# Patient Record
Sex: FEMALE | Race: WHITE | Employment: FULL TIME | URBAN - METROPOLITAN AREA
[De-identification: names, ages, dates, MRNs, and addresses within clinical notes are randomized per-mention and may not be internally consistent; named-entity substitution may affect disease eponyms.]

---

## 2017-04-25 ENCOUNTER — OFFICE VISIT (OUTPATIENT)
Dept: OBGYN CLINIC | Facility: CLINIC | Age: 27
End: 2017-04-25

## 2017-04-25 VITALS
HEART RATE: 88 BPM | HEIGHT: 61 IN | SYSTOLIC BLOOD PRESSURE: 114 MMHG | WEIGHT: 107 LBS | DIASTOLIC BLOOD PRESSURE: 57 MMHG | BODY MASS INDEX: 20.2 KG/M2

## 2017-04-25 DIAGNOSIS — N76.0 VAGINITIS AND VULVOVAGINITIS: ICD-10-CM

## 2017-04-25 DIAGNOSIS — Z01.419 WELL WOMAN EXAM WITH ROUTINE GYNECOLOGICAL EXAM: Primary | ICD-10-CM

## 2017-04-25 DIAGNOSIS — N39.0 URINARY TRACT INFECTION, SITE UNSPECIFIED: ICD-10-CM

## 2017-04-25 DIAGNOSIS — Z11.3 SCREEN FOR STD (SEXUALLY TRANSMITTED DISEASE): ICD-10-CM

## 2017-04-25 PROCEDURE — 88175 CYTOPATH C/V AUTO FLUID REDO: CPT | Performed by: OBSTETRICS & GYNECOLOGY

## 2017-04-25 PROCEDURE — 87510 GARDNER VAG DNA DIR PROBE: CPT | Performed by: OBSTETRICS & GYNECOLOGY

## 2017-04-25 PROCEDURE — 81002 URINALYSIS NONAUTO W/O SCOPE: CPT | Performed by: OBSTETRICS & GYNECOLOGY

## 2017-04-25 PROCEDURE — 87480 CANDIDA DNA DIR PROBE: CPT | Performed by: OBSTETRICS & GYNECOLOGY

## 2017-04-25 PROCEDURE — 99385 PREV VISIT NEW AGE 18-39: CPT | Performed by: OBSTETRICS & GYNECOLOGY

## 2017-04-25 PROCEDURE — 87660 TRICHOMONAS VAGIN DIR PROBE: CPT | Performed by: OBSTETRICS & GYNECOLOGY

## 2017-04-25 NOTE — PROGRESS NOTES
Eben Cotton is a 27year old female  Patient's last menstrual period was 04/15/2017 (exact date). Patient presents with:  Wellness Visit: annual exam  UTI: burning when urinating X 1 month  .      Patient complain of some burning at the end of u itching  Skin/Breast:  Denies any breast pain, lumps, or discharge. Neurological:  denies headaches, extremity weakness or numbness.       PHYSICAL EXAM:   Constitutional: well developed, well nourished  Head/Face: normocephalic  Neck/Thyroid: thyroid sym

## 2017-04-27 ENCOUNTER — TELEPHONE (OUTPATIENT)
Dept: OBGYN CLINIC | Facility: CLINIC | Age: 27
End: 2017-04-27

## 2018-03-26 ENCOUNTER — OFFICE VISIT (OUTPATIENT)
Dept: OBGYN CLINIC | Facility: CLINIC | Age: 28
End: 2018-03-26

## 2018-03-26 VITALS
BODY MASS INDEX: 19.26 KG/M2 | RESPIRATION RATE: 16 BRPM | HEIGHT: 61 IN | HEART RATE: 101 BPM | DIASTOLIC BLOOD PRESSURE: 68 MMHG | WEIGHT: 102 LBS | SYSTOLIC BLOOD PRESSURE: 124 MMHG

## 2018-03-26 DIAGNOSIS — Z30.09 BIRTH CONTROL COUNSELING: ICD-10-CM

## 2018-03-26 DIAGNOSIS — Z01.419 WELL WOMAN EXAM WITH ROUTINE GYNECOLOGICAL EXAM: Primary | ICD-10-CM

## 2018-03-26 PROCEDURE — 99395 PREV VISIT EST AGE 18-39: CPT | Performed by: NURSE PRACTITIONER

## 2018-03-26 NOTE — PROGRESS NOTES
Here for new gynecology visit. 24year old G 0 P 0. Patient's last menstrual period was 02/23/2018 (exact date). Here for Annual Gynecologic Exam. She is interested in her contraception options. Declines STD screen. Menses Q 30 days for 5-7 days. adenopathy. VULVA:  No lesions or erythema. VAGINA:  No lesions, small white discharge in vault. CERVIX:  No lesions. UTERUS:  anteflexed and normal size. ADNEXA:  No pain or masses.     IMP:    Well Woman Exam.  Birth Control Counseling     PLAN:    D

## 2018-05-01 ENCOUNTER — HOSPITAL ENCOUNTER (OUTPATIENT)
Age: 28
Discharge: HOME | End: 2018-05-01
Attending: FAMILY MEDICINE
Payer: COMMERCIAL

## 2018-05-01 VITALS
SYSTOLIC BLOOD PRESSURE: 133 MMHG | HEART RATE: 104 BPM | TEMPERATURE: 98 F | OXYGEN SATURATION: 100 % | RESPIRATION RATE: 18 BRPM | DIASTOLIC BLOOD PRESSURE: 85 MMHG

## 2018-05-01 DIAGNOSIS — R10.30 LOWER ABDOMINAL PAIN: Primary | ICD-10-CM

## 2018-05-01 PROCEDURE — 99203 OFFICE O/P NEW LOW 30 MIN: CPT

## 2018-05-01 PROCEDURE — 81025 URINE PREGNANCY TEST: CPT | Performed by: FAMILY MEDICINE

## 2018-05-01 PROCEDURE — 36415 COLL VENOUS BLD VENIPUNCTURE: CPT

## 2018-05-01 PROCEDURE — 85025 COMPLETE CBC W/AUTO DIFF WBC: CPT | Performed by: FAMILY MEDICINE

## 2018-05-01 PROCEDURE — 99213 OFFICE O/P EST LOW 20 MIN: CPT

## 2018-05-01 PROCEDURE — 80047 BASIC METABLC PNL IONIZED CA: CPT

## 2018-05-01 PROCEDURE — 81002 URINALYSIS NONAUTO W/O SCOPE: CPT | Performed by: FAMILY MEDICINE

## 2018-05-01 NOTE — ED INITIAL ASSESSMENT (HPI)
Pt here with c/o left pelvic pain x 3 days. Now the right side is painful. Denies urinary symptoms. Denies nausea, vomiting, constipation, fever. Had diarrhea a couple of day ago.

## 2018-05-02 ENCOUNTER — HOSPITAL ENCOUNTER (EMERGENCY)
Facility: HOSPITAL | Age: 28
Discharge: HOME | End: 2018-05-02
Attending: EMERGENCY MEDICINE
Payer: COMMERCIAL

## 2018-05-02 ENCOUNTER — APPOINTMENT (OUTPATIENT)
Dept: ULTRASOUND IMAGING | Facility: HOSPITAL | Age: 28
End: 2018-05-02
Attending: EMERGENCY MEDICINE
Payer: COMMERCIAL

## 2018-05-02 VITALS
TEMPERATURE: 98 F | WEIGHT: 105 LBS | HEIGHT: 62 IN | DIASTOLIC BLOOD PRESSURE: 65 MMHG | OXYGEN SATURATION: 100 % | SYSTOLIC BLOOD PRESSURE: 103 MMHG | HEART RATE: 71 BPM | RESPIRATION RATE: 18 BRPM | BODY MASS INDEX: 19.32 KG/M2

## 2018-05-02 DIAGNOSIS — N83.202 CYST OF LEFT OVARY: Primary | ICD-10-CM

## 2018-05-02 PROCEDURE — 99284 EMERGENCY DEPT VISIT MOD MDM: CPT

## 2018-05-02 PROCEDURE — 93975 VASCULAR STUDY: CPT | Performed by: EMERGENCY MEDICINE

## 2018-05-02 PROCEDURE — 76856 US EXAM PELVIC COMPLETE: CPT | Performed by: EMERGENCY MEDICINE

## 2018-05-02 PROCEDURE — 76830 TRANSVAGINAL US NON-OB: CPT | Performed by: EMERGENCY MEDICINE

## 2018-05-02 RX ORDER — TRAMADOL HYDROCHLORIDE 50 MG/1
50 TABLET ORAL EVERY 4 HOURS PRN
Qty: 10 TABLET | Refills: 0 | Status: SHIPPED | OUTPATIENT
Start: 2018-05-02 | End: 2018-05-09

## 2018-05-02 NOTE — ED PROVIDER NOTES
Patient Seen in: [facility]    History   No chief complaint on file. Stated Complaint: ABD PAIN     HPI    24year old female presents for lower abdominal pain.  States she has intermittent lower abdominal pain for past 3 days regular rhythm, normal heart sounds and intact distal pulses. Pulmonary/Chest: Effort normal and breath sounds normal.   Abdominal: Soft. Bowel sounds are normal. She exhibits no distension and no mass. There is no tenderness.  There is no rebound and no

## 2018-05-02 NOTE — ED INITIAL ASSESSMENT (HPI)
Pt c/o LLQ/ suprapubic pain; she was seen at immediate care yesterday where she was told it was probable that she has an ovarian cyst. Unable to obtain ultrasound at immediate care and pt still in pain so to ED today for further work up.  Pt was also told h

## 2018-05-02 NOTE — ED PROVIDER NOTES
Patient Seen in: [facility] Emergency Department    History   Patient presents with:  Abdomen/Flank Pain (GI/)    Stated Complaint: LLQ/suprapubic pain    HPI    Who presents with left lower quadrant abdominal pain.   And suprapubic pain she was se auscultation, there is no wheezing or retraction. No crackles. CV: Cardiovascular is regular without murmurs or rubs. ABD: The abdomen is soft nondistended suprapubic tenderness, left lower quadrant tenderness.   No tenderness over right McBurney's a Ovary: 7.13 cm x 6.95 cm x 5.83 cm  FINDINGS:  PELVIS  UTERUS:  Unremarkable appearance. RIGHT OVARY:  The right ovary appears normal in size, shape, and echogenicity. No significant masses are identified.  LEFT OVARY:  There is a [id number]  [phone]              Medications Prescribed:  Current Discharge Medication List    START taking these medications    TraMADol HCl 50 MG Oral Tab  Take 1 tablet (50 mg total) by mouth every 4 (four) hours as needed for Pain.   Qty: 10 tablet Refi

## 2018-06-05 ENCOUNTER — HOSPITAL ENCOUNTER (EMERGENCY)
Facility: HOSPITAL | Age: 28
Discharge: HOME | End: 2018-06-05
Attending: EMERGENCY MEDICINE
Payer: COMMERCIAL

## 2018-06-05 ENCOUNTER — APPOINTMENT (OUTPATIENT)
Dept: ULTRASOUND IMAGING | Facility: HOSPITAL | Age: 28
End: 2018-06-05
Attending: EMERGENCY MEDICINE
Payer: COMMERCIAL

## 2018-06-05 VITALS
DIASTOLIC BLOOD PRESSURE: 66 MMHG | SYSTOLIC BLOOD PRESSURE: 148 MMHG | HEART RATE: 78 BPM | RESPIRATION RATE: 16 BRPM | HEIGHT: 62 IN | TEMPERATURE: 97 F | WEIGHT: 104 LBS | BODY MASS INDEX: 19.14 KG/M2 | OXYGEN SATURATION: 99 %

## 2018-06-05 DIAGNOSIS — N83.202 CYST OF LEFT OVARY: ICD-10-CM

## 2018-06-05 DIAGNOSIS — R10.2 PELVIC PAIN IN FEMALE: Primary | ICD-10-CM

## 2018-06-05 PROCEDURE — 85025 COMPLETE CBC W/AUTO DIFF WBC: CPT | Performed by: EMERGENCY MEDICINE

## 2018-06-05 PROCEDURE — 81001 URINALYSIS AUTO W/SCOPE: CPT | Performed by: EMERGENCY MEDICINE

## 2018-06-05 PROCEDURE — 96374 THER/PROPH/DIAG INJ IV PUSH: CPT

## 2018-06-05 PROCEDURE — 81025 URINE PREGNANCY TEST: CPT

## 2018-06-05 PROCEDURE — 96361 HYDRATE IV INFUSION ADD-ON: CPT

## 2018-06-05 PROCEDURE — 99284 EMERGENCY DEPT VISIT MOD MDM: CPT

## 2018-06-05 PROCEDURE — 76830 TRANSVAGINAL US NON-OB: CPT | Performed by: EMERGENCY MEDICINE

## 2018-06-05 PROCEDURE — 80053 COMPREHEN METABOLIC PANEL: CPT | Performed by: EMERGENCY MEDICINE

## 2018-06-05 PROCEDURE — 93975 VASCULAR STUDY: CPT | Performed by: EMERGENCY MEDICINE

## 2018-06-05 PROCEDURE — 76856 US EXAM PELVIC COMPLETE: CPT | Performed by: EMERGENCY MEDICINE

## 2018-06-05 RX ORDER — SODIUM CHLORIDE 9 MG/ML
INJECTION, SOLUTION INTRAVENOUS ONCE
Status: COMPLETED | OUTPATIENT
Start: 2018-06-05 | End: 2018-06-05

## 2018-06-05 RX ORDER — KETOROLAC TROMETHAMINE 30 MG/ML
15 INJECTION, SOLUTION INTRAMUSCULAR; INTRAVENOUS ONCE
Status: COMPLETED | OUTPATIENT
Start: 2018-06-05 | End: 2018-06-05

## 2018-06-05 NOTE — ED INITIAL ASSESSMENT (HPI)
Pt presents to ED with complaint of left lower abdominal pain. Reports history of ovarian cysts. Reports she called gyne yesterday who instructed her to take both motrin and tylenol and she reports she is still having pain.  Reports starting birth control p

## 2018-06-05 NOTE — ED NOTES
Report given to [facility]. Per Dr Stokes Her okay for patient to drink PO fluids to fill bladder.

## 2018-06-05 NOTE — ED NOTES
Pt reevaluated by er physician. Informed of all her test reports andplan of care. Pt verbalizing understanding.

## 2018-06-05 NOTE — ED PROVIDER NOTES
Patient Seen in: [facility] Emergency Department    History   Patient presents with:  Abdomen/Flank Pain (GI/)    Stated Complaint:     HPI    Patient is a pleasant 19-year-old female, presenting for evaluation of left-sided pelvic pain.     Yoli of trauma. Extraocular muscles are intact. Oropharynx is pink and moist.  NECK: Neck is supple and nontender. The trachea is midline. LUNGS: Lungs are clear to auscultation bilaterally, respirations are unlabored. HEART: Regular rate and rhythm.  There Ultrasound of the pelvis was performed with a transvaginal and transabdominal probe. Doppler evaluation of the ovaries was performed of the ovarian arteries and veins. B-mode images, Doppler color flow, and spectral waveform analysis were performed.   Tra Dictated by: Jr Castellon MD on 6/05/2018 at 8:12     Approved by: Jr Castellon MD            ED Course as of Jun 05 0913  ------------------------------------------------------------      MDM     Patient was placed on a cart, an IV was establi

## 2018-09-03 ENCOUNTER — HOSPITAL ENCOUNTER (OUTPATIENT)
Age: 28
Discharge: HOME | End: 2018-09-03
Payer: COMMERCIAL

## 2018-09-03 VITALS
BODY MASS INDEX: 19.88 KG/M2 | TEMPERATURE: 98 F | OXYGEN SATURATION: 100 % | SYSTOLIC BLOOD PRESSURE: 100 MMHG | HEIGHT: 62 IN | WEIGHT: 108 LBS | DIASTOLIC BLOOD PRESSURE: 54 MMHG | HEART RATE: 81 BPM | RESPIRATION RATE: 20 BRPM

## 2018-09-03 DIAGNOSIS — R19.7 DIARRHEA, UNSPECIFIED TYPE: Primary | ICD-10-CM

## 2018-09-03 DIAGNOSIS — R11.0 NAUSEA: ICD-10-CM

## 2018-09-03 LAB
#LYMPHOCYTE IC: 1.5 X10ˆ3/UL (ref 0.9–3.2)
#MXD IC: 0.4 X10ˆ3/UL (ref 0.1–1)
#NEUTROPHIL IC: 5.3 X10ˆ3/UL (ref 1.3–6.7)
CREAT SERPL-MCNC: 0.9 MG/DL (ref 0.55–1.02)
GLUCOSE BLD-MCNC: 115 MG/DL (ref 70–99)
HCT IC: 39.5 % (ref 37–54)
HGB IC: 13.8 G/DL (ref 12–16)
ISTAT BUN: 16 MG/DL (ref 8–20)
ISTAT CHLORIDE: 106 MMOL/L (ref 101–111)
ISTAT HEMATOCRIT: 40 % (ref 34–50)
ISTAT IONIZED CALCIUM: 1.16 MMOL/L
ISTAT POTASSIUM: 4.3 MMOL/L (ref 3.6–5.1)
ISTAT SODIUM: 140 MMOL/L (ref 136–144)
LYMPHOCYTES NFR BLD AUTO: 20.5 %
MCH IC: 31.4 PG (ref 27–33.2)
MCHC IC: 34.9 G/DL (ref 31–37)
MCV IC: 90 FL (ref 81–100)
MIXED CELL %: 6 %
NEUTROPHILS NFR BLD AUTO: 73.5 %
PLT IC: 237 X10ˆ3/UL (ref 150–450)
POCT BILIRUBIN URINE: NEGATIVE
POCT GLUCOSE URINE: NEGATIVE MG/DL
POCT KETONE URINE: NEGATIVE MG/DL
POCT LEUKOCYTE ESTERASE URINE: NEGATIVE
POCT NITRITE URINE: NEGATIVE
POCT PH URINE: 5.5 (ref 5–8)
POCT PROTEIN URINE: NEGATIVE MG/DL
POCT SPECIFIC GRAVITY URINE: 1.02
POCT URINE COLOR: YELLOW
POCT UROBILINOGEN URINE: 0.2 MG/DL
RBC IC: 4.39 X10ˆ6/UL (ref 3.8–5.1)
WBC IC: 7.2 X10ˆ3/UL (ref 4–13)

## 2018-09-03 PROCEDURE — 81025 URINE PREGNANCY TEST: CPT | Performed by: PHYSICIAN ASSISTANT

## 2018-09-03 PROCEDURE — 96374 THER/PROPH/DIAG INJ IV PUSH: CPT

## 2018-09-03 PROCEDURE — 99214 OFFICE O/P EST MOD 30 MIN: CPT

## 2018-09-03 PROCEDURE — 81002 URINALYSIS NONAUTO W/O SCOPE: CPT | Performed by: PHYSICIAN ASSISTANT

## 2018-09-03 PROCEDURE — 96361 HYDRATE IV INFUSION ADD-ON: CPT

## 2018-09-03 PROCEDURE — 80047 BASIC METABLC PNL IONIZED CA: CPT

## 2018-09-03 PROCEDURE — 85025 COMPLETE CBC W/AUTO DIFF WBC: CPT | Performed by: PHYSICIAN ASSISTANT

## 2018-09-03 RX ORDER — DICYCLOMINE HCL 20 MG
20 TABLET ORAL 4 TIMES DAILY PRN
Qty: 30 TABLET | Refills: 0 | Status: SHIPPED | OUTPATIENT
Start: 2018-09-03 | End: 2018-10-03

## 2018-09-03 RX ORDER — ONDANSETRON 4 MG/1
4 TABLET, ORALLY DISINTEGRATING ORAL EVERY 4 HOURS PRN
Qty: 10 TABLET | Refills: 0 | Status: SHIPPED | OUTPATIENT
Start: 2018-09-03 | End: 2018-09-10

## 2018-09-03 RX ORDER — SODIUM CHLORIDE 9 MG/ML
1000 INJECTION, SOLUTION INTRAVENOUS ONCE
Status: COMPLETED | OUTPATIENT
Start: 2018-09-03 | End: 2018-09-03

## 2018-09-03 RX ORDER — ONDANSETRON 2 MG/ML
4 INJECTION INTRAMUSCULAR; INTRAVENOUS ONCE
Status: COMPLETED | OUTPATIENT
Start: 2018-09-03 | End: 2018-09-03

## 2018-09-03 RX ORDER — DICYCLOMINE HCL 20 MG
20 TABLET ORAL ONCE
Status: COMPLETED | OUTPATIENT
Start: 2018-09-03 | End: 2018-09-03

## 2018-09-03 NOTE — ED INITIAL ASSESSMENT (HPI)
Patient states developed diarrhea today at 1:30pm  Unsure of how many bouts of diarrhea  Nauseated  Denies any emesis  Tolerated cereal today  Denies any fever  Abdominal cramping

## 2018-09-03 NOTE — ED PROVIDER NOTES
Patient Seen in: [facility]    History   Patient presents with:  Diarrhea    Stated Complaint: DIARRHEA/SEVERE STOMACH CRAMPING     HPI    16-year-old female who comes in today complaining of diarrhea that started approximately posterior pharynx without exudate or erythema. No trismus or stridor, uvula midline.    Neck:  Supple; symmetrical, trachea midline, no adenopathy  Lungs:  Clear to auscultation bilaterally, respirations unlabored, no wheezing, rales or rhonchi   Heart:  Re patient's satisfaction prior to discharge today.       Disposition and Plan     Clinical Impression:  Diarrhea, unspecified type  (primary encounter diagnosis)  Nausea    Disposition:  Discharge  9/3/2018  4:48 pm    Follow-up:  Opal Henley

## 2018-09-04 LAB — POCT URINE PREGNANCY: NEGATIVE

## 2018-11-27 ENCOUNTER — HOSPITAL ENCOUNTER (OUTPATIENT)
Age: 28
Discharge: HOME | End: 2018-11-27
Attending: FAMILY MEDICINE
Payer: COMMERCIAL

## 2018-11-27 VITALS
RESPIRATION RATE: 12 BRPM | WEIGHT: 108 LBS | HEIGHT: 62 IN | OXYGEN SATURATION: 100 % | HEART RATE: 86 BPM | SYSTOLIC BLOOD PRESSURE: 120 MMHG | DIASTOLIC BLOOD PRESSURE: 88 MMHG | TEMPERATURE: 98 F | BODY MASS INDEX: 19.88 KG/M2

## 2018-11-27 DIAGNOSIS — R21 RASH: Primary | ICD-10-CM

## 2018-11-27 PROCEDURE — 99214 OFFICE O/P EST MOD 30 MIN: CPT

## 2018-11-27 PROCEDURE — 99213 OFFICE O/P EST LOW 20 MIN: CPT

## 2018-11-27 RX ORDER — CETIRIZINE HYDROCHLORIDE 10 MG/1
10 TABLET ORAL DAILY
Qty: 15 TABLET | Refills: 0 | Status: SHIPPED | OUTPATIENT
Start: 2018-11-27 | End: 2018-12-12

## 2018-11-27 RX ORDER — METHYLPREDNISOLONE 4 MG/1
TABLET ORAL
Qty: 1 PACKAGE | Refills: 0 | Status: SHIPPED | OUTPATIENT
Start: 2018-11-27 | End: 2018-12-27 | Stop reason: ALTCHOICE

## 2019-01-17 ENCOUNTER — HOSPITAL ENCOUNTER (OUTPATIENT)
Facility: HOSPITAL | Age: 29
Setting detail: HOSPITAL OUTPATIENT SURGERY
Discharge: HOME | End: 2019-01-17
Attending: OBSTETRICS & GYNECOLOGY | Admitting: OBSTETRICS & GYNECOLOGY
Payer: COMMERCIAL

## 2019-01-17 ENCOUNTER — ANESTHESIA (OUTPATIENT)
Dept: SURGERY | Facility: HOSPITAL | Age: 29
End: 2019-01-17
Payer: COMMERCIAL

## 2019-01-17 ENCOUNTER — ANESTHESIA EVENT (OUTPATIENT)
Dept: SURGERY | Facility: HOSPITAL | Age: 29
End: 2019-01-17
Payer: COMMERCIAL

## 2019-01-17 VITALS
OXYGEN SATURATION: 99 % | HEIGHT: 62 IN | DIASTOLIC BLOOD PRESSURE: 69 MMHG | RESPIRATION RATE: 18 BRPM | BODY MASS INDEX: 19.05 KG/M2 | HEART RATE: 76 BPM | SYSTOLIC BLOOD PRESSURE: 103 MMHG | WEIGHT: 103.5 LBS | TEMPERATURE: 98 F

## 2019-01-17 DIAGNOSIS — N83.202 OVARIAN CYST, LEFT: ICD-10-CM

## 2019-01-17 LAB
POCT LOT NUMBER: NORMAL
POCT URINE PREGNANCY: NEGATIVE

## 2019-01-17 PROCEDURE — 0JBB3ZX EXCISION OF PERINEUM SUBCUTANEOUS TISSUE AND FASCIA, PERCUTANEOUS APPROACH, DIAGNOSTIC: ICD-10-PCS | Performed by: OBSTETRICS & GYNECOLOGY

## 2019-01-17 PROCEDURE — 88305 TISSUE EXAM BY PATHOLOGIST: CPT | Performed by: OBSTETRICS & GYNECOLOGY

## 2019-01-17 PROCEDURE — 81025 URINE PREGNANCY TEST: CPT | Performed by: OBSTETRICS & GYNECOLOGY

## 2019-01-17 RX ORDER — LABETALOL HYDROCHLORIDE 5 MG/ML
5 INJECTION, SOLUTION INTRAVENOUS EVERY 5 MIN PRN
Status: DISCONTINUED | OUTPATIENT
Start: 2019-01-17 | End: 2019-01-17

## 2019-01-17 RX ORDER — BUPIVACAINE HYDROCHLORIDE 5 MG/ML
INJECTION, SOLUTION EPIDURAL; INTRACAUDAL AS NEEDED
Status: DISCONTINUED | OUTPATIENT
Start: 2019-01-17 | End: 2019-01-17 | Stop reason: HOSPADM

## 2019-01-17 RX ORDER — MEPERIDINE HYDROCHLORIDE 25 MG/ML
12.5 INJECTION INTRAMUSCULAR; INTRAVENOUS; SUBCUTANEOUS AS NEEDED
Status: DISCONTINUED | OUTPATIENT
Start: 2019-01-17 | End: 2019-01-17

## 2019-01-17 RX ORDER — DEXAMETHASONE SODIUM PHOSPHATE 4 MG/ML
4 VIAL (ML) INJECTION AS NEEDED
Status: DISCONTINUED | OUTPATIENT
Start: 2019-01-17 | End: 2019-01-17

## 2019-01-17 RX ORDER — SODIUM CHLORIDE, SODIUM LACTATE, POTASSIUM CHLORIDE, CALCIUM CHLORIDE 600; 310; 30; 20 MG/100ML; MG/100ML; MG/100ML; MG/100ML
INJECTION, SOLUTION INTRAVENOUS CONTINUOUS
Status: DISCONTINUED | OUTPATIENT
Start: 2019-01-17 | End: 2019-01-17

## 2019-01-17 RX ORDER — ONDANSETRON 2 MG/ML
4 INJECTION INTRAMUSCULAR; INTRAVENOUS AS NEEDED
Status: DISCONTINUED | OUTPATIENT
Start: 2019-01-17 | End: 2019-01-17

## 2019-01-17 RX ORDER — CEFAZOLIN SODIUM/WATER 2 G/20 ML
SYRINGE (ML) INTRAVENOUS
Status: DISCONTINUED | OUTPATIENT
Start: 2019-01-17 | End: 2019-01-17 | Stop reason: HOSPADM

## 2019-01-17 RX ORDER — MEPERIDINE HYDROCHLORIDE 25 MG/ML
INJECTION INTRAMUSCULAR; INTRAVENOUS; SUBCUTANEOUS
Status: COMPLETED
Start: 2019-01-17 | End: 2019-01-17

## 2019-01-17 RX ORDER — NALOXONE HYDROCHLORIDE 0.4 MG/ML
80 INJECTION, SOLUTION INTRAMUSCULAR; INTRAVENOUS; SUBCUTANEOUS AS NEEDED
Status: DISCONTINUED | OUTPATIENT
Start: 2019-01-17 | End: 2019-01-17

## 2019-01-17 RX ORDER — HYDROCODONE BITARTRATE AND ACETAMINOPHEN 5; 325 MG/1; MG/1
2 TABLET ORAL AS NEEDED
Status: COMPLETED | OUTPATIENT
Start: 2019-01-17 | End: 2019-01-17

## 2019-01-17 RX ORDER — HYDROCODONE BITARTRATE AND ACETAMINOPHEN 5; 325 MG/1; MG/1
1 TABLET ORAL EVERY 6 HOURS PRN
Qty: 10 TABLET | Refills: 0 | Status: SHIPPED | OUTPATIENT
Start: 2019-01-17 | End: 2019-01-24

## 2019-01-17 RX ORDER — HYDROMORPHONE HYDROCHLORIDE 1 MG/ML
0.4 INJECTION, SOLUTION INTRAMUSCULAR; INTRAVENOUS; SUBCUTANEOUS EVERY 5 MIN PRN
Status: DISCONTINUED | OUTPATIENT
Start: 2019-01-17 | End: 2019-01-17

## 2019-01-17 RX ORDER — ACETAMINOPHEN 500 MG
1000 TABLET ORAL ONCE
Status: DISCONTINUED | OUTPATIENT
Start: 2019-01-17 | End: 2019-01-17 | Stop reason: HOSPADM

## 2019-01-17 RX ORDER — ACETAMINOPHEN 500 MG
1000 TABLET ORAL ONCE
COMMUNITY
End: 2019-11-27 | Stop reason: ALTCHOICE

## 2019-01-17 RX ORDER — METOCLOPRAMIDE HYDROCHLORIDE 5 MG/ML
10 INJECTION INTRAMUSCULAR; INTRAVENOUS AS NEEDED
Status: DISCONTINUED | OUTPATIENT
Start: 2019-01-17 | End: 2019-01-17

## 2019-01-17 RX ORDER — DIPHENHYDRAMINE HYDROCHLORIDE 50 MG/ML
12.5 INJECTION INTRAMUSCULAR; INTRAVENOUS AS NEEDED
Status: DISCONTINUED | OUTPATIENT
Start: 2019-01-17 | End: 2019-01-17

## 2019-01-17 RX ORDER — HYDROCODONE BITARTRATE AND ACETAMINOPHEN 5; 325 MG/1; MG/1
1 TABLET ORAL AS NEEDED
Status: COMPLETED | OUTPATIENT
Start: 2019-01-17 | End: 2019-01-17

## 2019-01-17 RX ORDER — SCOLOPAMINE TRANSDERMAL SYSTEM 1 MG/1
1 PATCH, EXTENDED RELEASE TRANSDERMAL
Status: DISCONTINUED | OUTPATIENT
Start: 2019-01-17 | End: 2019-01-17

## 2019-01-17 RX ORDER — MIDAZOLAM HYDROCHLORIDE 1 MG/ML
1 INJECTION INTRAMUSCULAR; INTRAVENOUS EVERY 5 MIN PRN
Status: DISCONTINUED | OUTPATIENT
Start: 2019-01-17 | End: 2019-01-17

## 2019-01-17 NOTE — ANESTHESIA POSTPROCEDURE EVALUATION
[facility] Romelle Banana Patient Status:  Hospital Outpatient Surgery   Age/Gender 34year old female MRN [id number]   [facility] SURGERY Attending Xiomy Sheets MD   Hosp Day # 0 PCP No primary care provider on file.        Anesth

## 2019-01-17 NOTE — H&P
[address] [facility] H&P    Jennette Nissen Patient Status:  Hospital Outpatient Surgery    1990 MRN [id number]   [facility] SURGERY Attending Barry Romero MD   Hosp Day # 0 PCP No primary care provider on file.      SUBJECTIVE:  Re reviewed. No pertinent surgical history.     Past OB History  OB History    Para Term  AB Living   0 0 0 0 0 0   SAB TAB Ectopic Multiple Live Births   0 0 0 0               Past GYN History        Social History  Social History    Tobacco Use

## 2019-01-17 NOTE — ANESTHESIA PREPROCEDURE EVALUATION
PRE-OP EVALUATION    Patient Name: Porfirio Haines    Pre-op Diagnosis: Ovarian cyst, left [N83.202]    Procedure(s):  LAPAROSCOPIC LEFT OVARIAN CYSTCTOMY POSSIBLE LEFT SALPINGO OOPHORECTOMY    Surgeon(s) and Role:     * David Diamond MD - Primary    Pr No     Available pre-op labs reviewed.                Airway      Mallampati: II  Mouth opening: 3 FB  TM distance: 4 - 6 cm  Neck ROM: full Cardiovascular    Cardiovascular exam normal.  Rhythm: regular  Rate: normal     Dental    No notable dental history

## 2019-01-17 NOTE — OPERATIVE REPORT
Aniya King Patient Status:  Hospital Outpatient Surgery    1990 MRN [id number]   [facility] SURGERY Attending Douglas Hughes MD   Hosp Day # 0 PCP No primary care provider on file.          OPERATIVE REPORT      PREOPERATIVE DIAG position. She tolerated the procedure well with a  5 cc blood loss and went to recovery in good condition.

## 2019-05-14 PROBLEM — M51.360 DISCOGENIC LOW BACK PAIN: Status: ACTIVE | Noted: 2019-05-14

## 2019-05-14 PROBLEM — M51.36 DISCOGENIC LOW BACK PAIN: Status: ACTIVE | Noted: 2019-05-14

## 2019-10-08 PROCEDURE — 86812 HLA TYPING A B OR C: CPT | Performed by: PHYSICAL MEDICINE & REHABILITATION

## 2019-11-27 PROBLEM — Z30.41 ENCOUNTER FOR SURVEILLANCE OF CONTRACEPTIVE PILLS: Status: ACTIVE | Noted: 2019-11-27

## 2021-03-31 ENCOUNTER — MED REC SCAN ONLY (OUTPATIENT)
Dept: INTERNAL MEDICINE CLINIC | Facility: CLINIC | Age: 31
End: 2021-03-31

## 2021-04-08 ENCOUNTER — OFFICE VISIT (OUTPATIENT)
Dept: INTERNAL MEDICINE CLINIC | Facility: CLINIC | Age: 31
End: 2021-04-08
Payer: COMMERCIAL

## 2021-04-08 VITALS
HEIGHT: 62 IN | WEIGHT: 108 LBS | TEMPERATURE: 98 F | DIASTOLIC BLOOD PRESSURE: 76 MMHG | RESPIRATION RATE: 16 BRPM | BODY MASS INDEX: 19.88 KG/M2 | HEART RATE: 105 BPM | SYSTOLIC BLOOD PRESSURE: 116 MMHG

## 2021-04-08 DIAGNOSIS — M79.7 FIBROMYALGIA: Primary | ICD-10-CM

## 2021-04-08 DIAGNOSIS — R76.8 POSITIVE ANA (ANTINUCLEAR ANTIBODY): ICD-10-CM

## 2021-04-08 DIAGNOSIS — L50.8 CHRONIC URTICARIA: ICD-10-CM

## 2021-04-08 DIAGNOSIS — M25.50 POLYARTHRALGIA: ICD-10-CM

## 2021-04-08 DIAGNOSIS — R33.9 URINARY RETENTION: ICD-10-CM

## 2021-04-08 PROCEDURE — 3008F BODY MASS INDEX DOCD: CPT | Performed by: PHYSICIAN ASSISTANT

## 2021-04-08 PROCEDURE — 99204 OFFICE O/P NEW MOD 45 MIN: CPT | Performed by: PHYSICIAN ASSISTANT

## 2021-04-08 PROCEDURE — 3078F DIAST BP <80 MM HG: CPT | Performed by: PHYSICIAN ASSISTANT

## 2021-04-08 PROCEDURE — 3074F SYST BP LT 130 MM HG: CPT | Performed by: PHYSICIAN ASSISTANT

## 2021-04-08 NOTE — PROGRESS NOTES
Patient presents with:  Fibromyalgia: DD Rm 2, DX in December 2020, Bladder Retention test, bulging disk L5, PT not effective  Hives: Lab results ORLANDO testing positive,   Migraine: 03/21, Worse around menstrual cycle, 3- 5 days duration      HPI:  Pt presen breath and wheezing. Cardiovascular: Negative for chest pain, palpitations and leg swelling.    Gastrointestinal: Negative for nausea, vomiting, abdominal pain, diarrhea, blood in stool   Genitourinary: Negative for dysuria, hematuria and difficulty urin gabapentin 100 MG Oral Cap Take one po qhs x 5 days; then one po bid x 5 days; then one tid thereafter (Patient not taking: Reported on 4/8/2021 ) 90 capsule 0   • Norethindrone Acet-Ethinyl Est (MICROGESTIN 1/20) 1-20 MG-MCG Oral Tab Take one pill daily s tenderness. No effusions. Lymphadenopathy: No cervical adenopathy. Neurological: Normal reflexes. No cranial nerve deficit or sensory deficit. Normal muscle tone. Coordination normal.   Skin: Skin is warm and dry. No rash noted. No erythema. No pallor.

## 2021-04-10 ENCOUNTER — LAB ENCOUNTER (OUTPATIENT)
Dept: LAB | Facility: HOSPITAL | Age: 31
End: 2021-04-10
Attending: PHYSICIAN ASSISTANT
Payer: COMMERCIAL

## 2021-04-10 DIAGNOSIS — R76.8 POSITIVE ANA (ANTINUCLEAR ANTIBODY): ICD-10-CM

## 2021-04-10 DIAGNOSIS — M25.50 POLYARTHRALGIA: ICD-10-CM

## 2021-04-10 DIAGNOSIS — M79.7 FIBROMYALGIA: ICD-10-CM

## 2021-04-10 DIAGNOSIS — L50.8 CHRONIC URTICARIA: ICD-10-CM

## 2021-04-10 PROCEDURE — 86038 ANTINUCLEAR ANTIBODIES: CPT

## 2021-04-10 PROCEDURE — 36415 COLL VENOUS BLD VENIPUNCTURE: CPT

## 2021-04-10 PROCEDURE — 86200 CCP ANTIBODY: CPT

## 2021-04-10 PROCEDURE — 85652 RBC SED RATE AUTOMATED: CPT

## 2021-04-10 PROCEDURE — 86431 RHEUMATOID FACTOR QUANT: CPT

## 2021-04-10 PROCEDURE — 86140 C-REACTIVE PROTEIN: CPT

## 2021-04-12 ENCOUNTER — TELEPHONE (OUTPATIENT)
Dept: INTERNAL MEDICINE CLINIC | Facility: CLINIC | Age: 31
End: 2021-04-12

## 2021-04-12 NOTE — TELEPHONE ENCOUNTER
Message  Received: 4 days ago  Tarry Severs, PA-C  P [facility] Clinical Staff  I forgot to put names on referrals placed for Rheum (Lorenzo Morris) and Urology [facility]). [facility] call pt with info to call for appt.

## 2021-04-12 NOTE — TELEPHONE ENCOUNTER
Pt called back informed her info listed below. Pt thankful for the info given. Pt has additional questions. Does she needs to really see the rheumatologist? since all her results came back negative.  Please advise

## 2021-04-13 NOTE — TELEPHONE ENCOUNTER
Given pt has multiple complaints, a diagnosis of fibromyalgia and a h/o positive ORLANDO I would have her schedule appt with Rheum.

## 2021-04-13 NOTE — PROGRESS NOTES
CCP is negative as well (does not appear to be Rheumatoid arthritis per results) but as mentioned before should see Rheum for further consult.

## 2021-05-13 NOTE — H&P
HPI:     Vinod Dorantes is a 31year old female with a PMH of fibromyalgia, chronic pain, migraine HA, ovarian cyst, bulging disc L5.  She presents as a consult from [facility] office with feelings of OSKAR/having to push to emptying bladder at t discussed anticholinergic medications as well as myrbetriq for OAB and reviewed SEs to both options as well as possible cost of medications. The patient would prefer avoid any meds for now.     For AMH in patients < 35 we discussed optional tests would incl total) by mouth 2 (two) times daily as needed for Pain.  For 30 days 30 tablet 2   • Norethindrone Acet-Ethinyl Est (MICROGESTIN 1/20) 1-20 MG-MCG Oral Tab Take one pill daily skipping placebo pills 3 Package 4   • ERGOCALCIFEROL 1.25 MG (64057 UT) Oral Cap

## 2021-05-15 ENCOUNTER — HOSPITAL ENCOUNTER (OUTPATIENT)
Age: 31
Discharge: HOME | End: 2021-05-15
Attending: EMERGENCY MEDICINE
Payer: COMMERCIAL

## 2021-05-15 VITALS
OXYGEN SATURATION: 99 % | SYSTOLIC BLOOD PRESSURE: 104 MMHG | RESPIRATION RATE: 16 BRPM | HEART RATE: 95 BPM | TEMPERATURE: 98 F | DIASTOLIC BLOOD PRESSURE: 68 MMHG

## 2021-05-15 DIAGNOSIS — H60.392 INFECTION OF LEFT EXTERNAL EAR: Primary | ICD-10-CM

## 2021-05-15 PROCEDURE — 99213 OFFICE O/P EST LOW 20 MIN: CPT

## 2021-05-15 RX ORDER — CIPROFLOXACIN 500 MG/1
500 TABLET, FILM COATED ORAL 2 TIMES DAILY
Qty: 20 TABLET | Refills: 0 | Status: SHIPPED | OUTPATIENT
Start: 2021-05-15 | End: 2021-05-25

## 2021-05-15 NOTE — ED INITIAL ASSESSMENT (HPI)
Patient states left earlobe piercing with bump for few weeks, possible infection. Completed 7 days of  Augmentin a weeks ago but not better.  No pain

## 2021-05-15 NOTE — ED PROVIDER NOTES
Patient Seen in: [facility]      History   Patient presents with:  Ear Problem    Stated Complaint: LEFT EAR  LOBE INFECTION    HPI/Subjective:   HPI    68-year-old female presents for evaluation of a possible left external ear infection. Hearing, tympanic membrane and ear canal normal. Swelling present. No tenderness. No mastoid tenderness. Tympanic membrane is not injected. Ears:     Neurological:      Mental Status: She is alert.               ED Course   Labs Reviewed - No data to d

## 2021-05-21 ENCOUNTER — OFFICE VISIT (OUTPATIENT)
Dept: SURGERY | Facility: CLINIC | Age: 31
End: 2021-05-21
Payer: COMMERCIAL

## 2021-05-21 DIAGNOSIS — R31.21 ASYMPTOMATIC MICROSCOPIC HEMATURIA: Primary | ICD-10-CM

## 2021-05-21 DIAGNOSIS — R39.15 URINARY URGENCY: ICD-10-CM

## 2021-05-21 PROCEDURE — 99244 OFF/OP CNSLTJ NEW/EST MOD 40: CPT | Performed by: UROLOGY

## 2021-05-21 PROCEDURE — 81003 URINALYSIS AUTO W/O SCOPE: CPT | Performed by: UROLOGY

## 2021-05-21 NOTE — PATIENT INSTRUCTIONS
Cross section of bladder showing normal lining (top) and inflamed/cystitis lining (right)     Interstitial cystitis is a painful condition of the bladder. It causes the bladder wall to be tender and easily irritated.  This leads to uncomfortable sympto help ease discomfort. · Antispasmodic medicines. These may help relax the bladder muscles. This may decrease the need to urinate. · Nonsteroidal anti-inflammatory drugs (NSAIDs). These may help reduce inflammation and ease pain. · Antihistamines.  These

## 2021-08-03 ENCOUNTER — OFFICE VISIT (OUTPATIENT)
Dept: INTERNAL MEDICINE CLINIC | Facility: CLINIC | Age: 31
End: 2021-08-03
Payer: COMMERCIAL

## 2021-08-03 VITALS
HEIGHT: 62 IN | DIASTOLIC BLOOD PRESSURE: 72 MMHG | WEIGHT: 106 LBS | OXYGEN SATURATION: 99 % | TEMPERATURE: 98 F | HEART RATE: 82 BPM | SYSTOLIC BLOOD PRESSURE: 116 MMHG | RESPIRATION RATE: 18 BRPM | BODY MASS INDEX: 19.51 KG/M2

## 2021-08-03 DIAGNOSIS — M54.16 LUMBAR RADICULOPATHY: Primary | ICD-10-CM

## 2021-08-03 DIAGNOSIS — M25.50 POLYARTHRALGIA: ICD-10-CM

## 2021-08-03 DIAGNOSIS — M79.7 FIBROMYALGIA: ICD-10-CM

## 2021-08-03 PROCEDURE — 3074F SYST BP LT 130 MM HG: CPT | Performed by: FAMILY MEDICINE

## 2021-08-03 PROCEDURE — 99214 OFFICE O/P EST MOD 30 MIN: CPT | Performed by: FAMILY MEDICINE

## 2021-08-03 PROCEDURE — 3008F BODY MASS INDEX DOCD: CPT | Performed by: FAMILY MEDICINE

## 2021-08-03 PROCEDURE — 3078F DIAST BP <80 MM HG: CPT | Performed by: FAMILY MEDICINE

## 2021-08-03 RX ORDER — CYCLOBENZAPRINE HCL 10 MG
10 TABLET ORAL 3 TIMES DAILY PRN
Qty: 30 TABLET | Refills: 0 | Status: SHIPPED | OUTPATIENT
Start: 2021-08-03

## 2021-08-03 RX ORDER — CETIRIZINE HYDROCHLORIDE 10 MG/1
10 TABLET ORAL DAILY
COMMUNITY

## 2021-08-03 RX ORDER — FEXOFENADINE HCL 180 MG/1
180 TABLET ORAL DAILY
COMMUNITY

## 2021-08-03 RX ORDER — METHYLPREDNISOLONE 4 MG/1
TABLET ORAL
Qty: 1 EACH | Refills: 0 | Status: SHIPPED | OUTPATIENT
Start: 2021-08-03

## 2021-08-03 NOTE — PROGRESS NOTES
Quinn Birmingham  8/18/1990    Patient presents with:  Sciatica: RG rm 9 possible pinched nerve noticed Friday.  Bilateral toe numbness occasionally noticed with back pains      HPI:   Quinn Birmingham is a 31year old female who presents for evaluation o COMMENTS)  Dust                    Coughing, Runny nose  Lyrica                  INSOMNIA  Nickel                  RASH  Seasonal                Runny nose, OTHER (SEE COMMENTS)    Comment:Sneezing, watery eyes   Past Medical History:   Diagnosis Date   • apparent distress  SKIN: No rashes  EYES: conjunctiva are clear  HEENT: atraumatic, normocephalic  NECK: supple  LUNGS: No increased work of breathing  CARDIO: Regular rate, intact distal pulses  MSK: Patient has no significant midline tenderness.   There i

## 2022-01-12 ENCOUNTER — IMMUNIZATION (OUTPATIENT)
Dept: LAB | Facility: HOSPITAL | Age: 32
End: 2022-01-12
Attending: EMERGENCY MEDICINE
Payer: COMMERCIAL

## 2022-01-12 DIAGNOSIS — Z23 NEED FOR VACCINATION: Primary | ICD-10-CM

## 2022-01-12 PROCEDURE — 0051A SARSCOV2 VAC 30MCG/0.3ML IM: CPT | Performed by: NURSE PRACTITIONER

## 2022-01-12 PROCEDURE — 0001A SARSCOV2 VAC 30MCG/0.3ML IM: CPT | Performed by: NURSE PRACTITIONER

## 2022-02-02 ENCOUNTER — IMMUNIZATION (OUTPATIENT)
Dept: LAB | Age: 32
End: 2022-02-02
Attending: NURSE PRACTITIONER
Payer: COMMERCIAL

## 2022-02-02 DIAGNOSIS — Z23 NEED FOR VACCINATION: Primary | ICD-10-CM

## 2022-02-02 PROCEDURE — 0052A SARSCOV2 VAC 30MCG TRS SUCR: CPT

## 2022-03-03 ENCOUNTER — OFFICE VISIT (OUTPATIENT)
Dept: FAMILY MEDICINE CLINIC | Facility: CLINIC | Age: 32
End: 2022-03-03
Payer: COMMERCIAL

## 2022-03-03 VITALS — BODY MASS INDEX: 22.45 KG/M2 | HEIGHT: 62 IN | WEIGHT: 122 LBS

## 2022-03-03 DIAGNOSIS — R25.1 SHAKINESS: ICD-10-CM

## 2022-03-03 DIAGNOSIS — R53.1 WEAKNESS: Primary | ICD-10-CM

## 2022-03-03 DIAGNOSIS — R63.1 INCREASED THIRST: ICD-10-CM

## 2022-03-03 DIAGNOSIS — E55.9 VITAMIN D DEFICIENCY: ICD-10-CM

## 2022-03-03 DIAGNOSIS — R32 URINARY INCONTINENCE, UNSPECIFIED TYPE: ICD-10-CM

## 2022-03-03 DIAGNOSIS — I73.00 RAYNAUD'S PHENOMENON WITHOUT GANGRENE: ICD-10-CM

## 2022-03-03 PROCEDURE — 99214 OFFICE O/P EST MOD 30 MIN: CPT | Performed by: STUDENT IN AN ORGANIZED HEALTH CARE EDUCATION/TRAINING PROGRAM

## 2022-03-03 PROCEDURE — 3008F BODY MASS INDEX DOCD: CPT | Performed by: STUDENT IN AN ORGANIZED HEALTH CARE EDUCATION/TRAINING PROGRAM

## 2022-03-03 RX ORDER — HYDROXYZINE HYDROCHLORIDE 10 MG/1
TABLET, FILM COATED ORAL
COMMUNITY
Start: 2022-01-10 | End: 2022-03-10

## 2022-03-05 ENCOUNTER — LAB ENCOUNTER (OUTPATIENT)
Dept: LAB | Facility: HOSPITAL | Age: 32
End: 2022-03-05
Attending: STUDENT IN AN ORGANIZED HEALTH CARE EDUCATION/TRAINING PROGRAM
Payer: COMMERCIAL

## 2022-03-05 DIAGNOSIS — I73.00 RAYNAUD'S PHENOMENON WITHOUT GANGRENE: ICD-10-CM

## 2022-03-05 DIAGNOSIS — R53.1 WEAKNESS: ICD-10-CM

## 2022-03-05 DIAGNOSIS — R63.1 INCREASED THIRST: ICD-10-CM

## 2022-03-05 DIAGNOSIS — E55.9 VITAMIN D DEFICIENCY: ICD-10-CM

## 2022-03-05 DIAGNOSIS — R25.1 SHAKINESS: ICD-10-CM

## 2022-03-05 LAB
ALBUMIN SERPL-MCNC: 4 G/DL (ref 3.4–5)
ALBUMIN/GLOB SERPL: 1.3 {RATIO} (ref 1–2)
ALP LIVER SERPL-CCNC: 45 U/L
ALT SERPL-CCNC: 33 U/L
ANION GAP SERPL CALC-SCNC: 3 MMOL/L (ref 0–18)
AST SERPL-CCNC: 19 U/L (ref 15–37)
B-HCG SERPL-ACNC: <1 MIU/ML
BASOPHILS # BLD AUTO: 0.03 X10(3) UL (ref 0–0.2)
BASOPHILS NFR BLD AUTO: 0.5 %
BILIRUB SERPL-MCNC: 0.5 MG/DL (ref 0.1–2)
BILIRUB UR QL STRIP.AUTO: NEGATIVE
CALCIUM BLD-MCNC: 8.9 MG/DL (ref 8.5–10.1)
CHLORIDE SERPL-SCNC: 111 MMOL/L (ref 98–112)
CO2 SERPL-SCNC: 26 MMOL/L (ref 21–32)
COLOR UR AUTO: YELLOW
CREAT BLD-MCNC: 0.73 MG/DL
EOSINOPHIL # BLD AUTO: 0.14 X10(3) UL (ref 0–0.7)
EOSINOPHIL NFR BLD AUTO: 2.2 %
ERYTHROCYTE [DISTWIDTH] IN BLOOD BY AUTOMATED COUNT: 12 %
EST. AVERAGE GLUCOSE BLD GHB EST-MCNC: 91 MG/DL (ref 68–126)
FASTING STATUS PATIENT QL REPORTED: YES
GLOBULIN PLAS-MCNC: 3.1 G/DL (ref 2.8–4.4)
GLUCOSE BLD-MCNC: 83 MG/DL (ref 70–99)
GLUCOSE UR STRIP.AUTO-MCNC: NEGATIVE MG/DL
HBA1C MFR BLD: 4.8 % (ref ?–5.7)
HCT VFR BLD AUTO: 36.2 %
HGB BLD-MCNC: 12.3 G/DL
IMM GRANULOCYTES # BLD AUTO: 0.01 X10(3) UL (ref 0–1)
IMM GRANULOCYTES NFR BLD: 0.2 %
KETONES UR STRIP.AUTO-MCNC: NEGATIVE MG/DL
LEUKOCYTE ESTERASE UR QL STRIP.AUTO: NEGATIVE
LYMPHOCYTES # BLD AUTO: 1.97 X10(3) UL (ref 1–4)
LYMPHOCYTES NFR BLD AUTO: 30.6 %
MCH RBC QN AUTO: 31.1 PG (ref 26–34)
MCHC RBC AUTO-ENTMCNC: 34 G/DL (ref 31–37)
MCV RBC AUTO: 91.6 FL
MONOCYTES # BLD AUTO: 0.5 X10(3) UL (ref 0.1–1)
MONOCYTES NFR BLD AUTO: 7.8 %
NEUTROPHILS # BLD AUTO: 3.79 X10 (3) UL (ref 1.5–7.7)
NEUTROPHILS # BLD AUTO: 3.79 X10(3) UL (ref 1.5–7.7)
NEUTROPHILS NFR BLD AUTO: 58.7 %
NITRITE UR QL STRIP.AUTO: NEGATIVE
OSMOLALITY SERPL CALC.SUM OF ELEC: 290 MOSM/KG (ref 275–295)
PH UR STRIP.AUTO: 6 [PH] (ref 5–8)
PLATELET # BLD AUTO: 207 10(3)UL (ref 150–450)
POTASSIUM SERPL-SCNC: 4.2 MMOL/L (ref 3.5–5.1)
PROT SERPL-MCNC: 7.1 G/DL (ref 6.4–8.2)
PROT UR STRIP.AUTO-MCNC: NEGATIVE MG/DL
RBC # BLD AUTO: 3.95 X10(6)UL
RBC UR QL AUTO: NEGATIVE
SODIUM SERPL-SCNC: 140 MMOL/L (ref 136–145)
SP GR UR STRIP.AUTO: 1.02 (ref 1–1.03)
TSI SER-ACNC: 1.68 MIU/ML (ref 0.36–3.74)
UROBILINOGEN UR STRIP.AUTO-MCNC: <2 MG/DL
VIT D+METAB SERPL-MCNC: 91.5 NG/ML (ref 30–100)
WBC # BLD AUTO: 6.4 X10(3) UL (ref 4–11)

## 2022-03-05 PROCEDURE — 81001 URINALYSIS AUTO W/SCOPE: CPT

## 2022-03-05 PROCEDURE — 82306 VITAMIN D 25 HYDROXY: CPT

## 2022-03-05 PROCEDURE — 36415 COLL VENOUS BLD VENIPUNCTURE: CPT

## 2022-03-05 PROCEDURE — 83036 HEMOGLOBIN GLYCOSYLATED A1C: CPT

## 2022-03-05 PROCEDURE — 84443 ASSAY THYROID STIM HORMONE: CPT

## 2022-03-05 PROCEDURE — 85025 COMPLETE CBC W/AUTO DIFF WBC: CPT

## 2022-03-05 PROCEDURE — 80053 COMPREHEN METABOLIC PANEL: CPT

## 2022-03-05 PROCEDURE — 84702 CHORIONIC GONADOTROPIN TEST: CPT

## 2022-03-10 ENCOUNTER — OFFICE VISIT (OUTPATIENT)
Dept: FAMILY MEDICINE CLINIC | Facility: CLINIC | Age: 32
End: 2022-03-10
Payer: COMMERCIAL

## 2022-03-10 VITALS — HEIGHT: 62 IN | BODY MASS INDEX: 22.45 KG/M2 | WEIGHT: 122 LBS

## 2022-03-10 DIAGNOSIS — R63.1 INCREASED THIRST: ICD-10-CM

## 2022-03-10 DIAGNOSIS — R25.1 SHAKINESS: ICD-10-CM

## 2022-03-10 DIAGNOSIS — R53.1 WEAKNESS: Primary | ICD-10-CM

## 2022-03-10 DIAGNOSIS — G43.829 PREMENSTRUAL MIGRAINE: ICD-10-CM

## 2022-03-10 PROCEDURE — 99214 OFFICE O/P EST MOD 30 MIN: CPT | Performed by: STUDENT IN AN ORGANIZED HEALTH CARE EDUCATION/TRAINING PROGRAM

## 2022-03-10 PROCEDURE — 3008F BODY MASS INDEX DOCD: CPT | Performed by: STUDENT IN AN ORGANIZED HEALTH CARE EDUCATION/TRAINING PROGRAM

## 2022-03-13 ENCOUNTER — LAB ENCOUNTER (OUTPATIENT)
Dept: LAB | Facility: HOSPITAL | Age: 32
End: 2022-03-13
Attending: STUDENT IN AN ORGANIZED HEALTH CARE EDUCATION/TRAINING PROGRAM
Payer: COMMERCIAL

## 2022-03-13 DIAGNOSIS — R53.1 WEAKNESS: ICD-10-CM

## 2022-03-13 DIAGNOSIS — R63.1 INCREASED THIRST: ICD-10-CM

## 2022-03-13 DIAGNOSIS — R25.1 SHAKINESS: ICD-10-CM

## 2022-03-13 DIAGNOSIS — G43.829 PREMENSTRUAL MIGRAINE: ICD-10-CM

## 2022-03-13 LAB
CORTIS SERPL-MCNC: 17.8 UG/DL
ESTRADIOL SERPL-MCNC: 153.4 PG/ML
FSH SERPL-ACNC: 1.4 MIU/ML
INSULIN SERPL-ACNC: 29.5 MU/L (ref 3–25)
LH SERPL-ACNC: 1.8 MIU/ML
PROGEST SERPL-MCNC: 9.83 NG/ML
PROLACTIN SERPL-MCNC: 40.9 NG/ML
TESTOST SERPL-MCNC: 18.11 NG/DL

## 2022-03-13 PROCEDURE — 83002 ASSAY OF GONADOTROPIN (LH): CPT

## 2022-03-13 PROCEDURE — 83001 ASSAY OF GONADOTROPIN (FSH): CPT

## 2022-03-13 PROCEDURE — 82024 ASSAY OF ACTH: CPT

## 2022-03-13 PROCEDURE — 84403 ASSAY OF TOTAL TESTOSTERONE: CPT

## 2022-03-13 PROCEDURE — 82670 ASSAY OF TOTAL ESTRADIOL: CPT

## 2022-03-13 PROCEDURE — 84146 ASSAY OF PROLACTIN: CPT

## 2022-03-13 PROCEDURE — 93010 ELECTROCARDIOGRAM REPORT: CPT | Performed by: INTERNAL MEDICINE

## 2022-03-13 PROCEDURE — 83525 ASSAY OF INSULIN: CPT

## 2022-03-13 PROCEDURE — 93005 ELECTROCARDIOGRAM TRACING: CPT

## 2022-03-13 PROCEDURE — 82533 TOTAL CORTISOL: CPT

## 2022-03-13 PROCEDURE — 84244 ASSAY OF RENIN: CPT

## 2022-03-13 PROCEDURE — 84144 ASSAY OF PROGESTERONE: CPT

## 2022-03-13 PROCEDURE — 83835 ASSAY OF METANEPHRINES: CPT

## 2022-03-13 PROCEDURE — 36415 COLL VENOUS BLD VENIPUNCTURE: CPT

## 2022-03-13 PROCEDURE — 82088 ASSAY OF ALDOSTERONE: CPT

## 2022-03-14 LAB
ATRIAL RATE: 87 BPM
P AXIS: 70 DEGREES
P-R INTERVAL: 120 MS
Q-T INTERVAL: 366 MS
QRS DURATION: 74 MS
QTC CALCULATION (BEZET): 440 MS
R AXIS: 90 DEGREES
T AXIS: 35 DEGREES
VENTRICULAR RATE: 87 BPM

## 2022-03-16 LAB — ADRENOCORTICOTROPIC HORMONE: 33.5 PG/ML

## 2022-03-17 LAB — ALDOSTERONE: 12.5 NG/DL

## 2022-03-18 LAB
METANEPHRINE: 0.21 NMOL/L
NORMETANEPHRINE: 0.3 NMOL/L
RENIN ACTIVITY: 0.9 NG/ML/HR

## 2022-03-19 ENCOUNTER — APPOINTMENT (OUTPATIENT)
Dept: LAB | Facility: HOSPITAL | Age: 32
End: 2022-03-19
Attending: STUDENT IN AN ORGANIZED HEALTH CARE EDUCATION/TRAINING PROGRAM
Payer: COMMERCIAL

## 2022-03-19 ENCOUNTER — HOSPITAL ENCOUNTER (OUTPATIENT)
Dept: CV DIAGNOSTICS | Facility: HOSPITAL | Age: 32
Discharge: HOME | End: 2022-03-19
Attending: STUDENT IN AN ORGANIZED HEALTH CARE EDUCATION/TRAINING PROGRAM
Payer: COMMERCIAL

## 2022-03-19 DIAGNOSIS — R53.1 WEAKNESS: ICD-10-CM

## 2022-03-19 DIAGNOSIS — R63.1 INCREASED THIRST: ICD-10-CM

## 2022-03-19 DIAGNOSIS — E16.1 INAPPROPRIATELY HIGH SERUM INSULIN: ICD-10-CM

## 2022-03-19 DIAGNOSIS — R25.1 SHAKINESS: ICD-10-CM

## 2022-03-19 DIAGNOSIS — R79.89 ELEVATED PROLACTIN LEVEL: ICD-10-CM

## 2022-03-19 LAB
INSULIN SERPL-ACNC: 9.7 MU/L (ref 3–25)
PROLACTIN SERPL-MCNC: 32.7 NG/ML

## 2022-03-19 PROCEDURE — 36415 COLL VENOUS BLD VENIPUNCTURE: CPT

## 2022-03-19 PROCEDURE — 83525 ASSAY OF INSULIN: CPT

## 2022-03-19 PROCEDURE — 84305 ASSAY OF SOMATOMEDIN: CPT

## 2022-03-19 PROCEDURE — 84146 ASSAY OF PROLACTIN: CPT

## 2022-03-19 PROCEDURE — 93246 EXT ECG>7D<15D RECORDING: CPT | Performed by: STUDENT IN AN ORGANIZED HEALTH CARE EDUCATION/TRAINING PROGRAM

## 2022-03-19 PROCEDURE — 82397 CHEMILUMINESCENT ASSAY: CPT

## 2022-03-19 PROCEDURE — 93247 EXT ECG>7D<15D SCAN A/R: CPT | Performed by: STUDENT IN AN ORGANIZED HEALTH CARE EDUCATION/TRAINING PROGRAM

## 2022-03-24 LAB — IGF 1 Z SCORE CALCULATION: -0.6

## 2022-03-27 LAB — IGF BINDING PROTEIN 3: 4390 NG/ML

## 2022-03-28 ENCOUNTER — TELEPHONE (OUTPATIENT)
Dept: FAMILY MEDICINE CLINIC | Facility: CLINIC | Age: 32
End: 2022-03-28

## 2022-03-28 NOTE — TELEPHONE ENCOUNTER
Pt called to insurance and states they have not received the fax we were to send regarding reasoning for pt MRI. Pt states the insurance requires we fax reasoning for MRI order,case number, pt's name, ,. Case # [id number]    Fax # [phone] for [facility]    Please fax this information to pt's insurance.     Pt has MRI scheduled for tomorrow 3/29/22 at 5:00 PM.    Please advise

## 2022-03-28 NOTE — TELEPHONE ENCOUNTER
I faxed over the [address] notes from 3/10/22 and prolactin results/result notes to [place] at [phone]    I also called pt to notify her that she should postpone the MRI until she knows whether or not it is approved, to avoid any out-of-pocket costs. She voices understanding, no additional questions at this time.

## 2022-04-19 ENCOUNTER — OFFICE VISIT (OUTPATIENT)
Dept: ENDOCRINOLOGY CLINIC | Facility: CLINIC | Age: 32
End: 2022-04-19
Payer: COMMERCIAL

## 2022-04-19 VITALS
WEIGHT: 121.63 LBS | BODY MASS INDEX: 22 KG/M2 | HEART RATE: 104 BPM | SYSTOLIC BLOOD PRESSURE: 108 MMHG | DIASTOLIC BLOOD PRESSURE: 62 MMHG

## 2022-04-19 DIAGNOSIS — E22.9 PITUITARY MICROADENOMA WITH HYPERPROLACTINEMIA (HCC): Primary | ICD-10-CM

## 2022-04-19 DIAGNOSIS — D35.2 PITUITARY MICROADENOMA WITH HYPERPROLACTINEMIA (HCC): Primary | ICD-10-CM

## 2022-05-16 ENCOUNTER — TELEPHONE (OUTPATIENT)
Dept: FAMILY MEDICINE CLINIC | Facility: CLINIC | Age: 32
End: 2022-05-16

## 2022-05-16 NOTE — TELEPHONE ENCOUNTER
If no recurrence of symptoms, can keep upcoming appointment on 5/18. If symptoms recur, patient should have same day evaluation in immediate care.

## 2022-05-16 NOTE — TELEPHONE ENCOUNTER
Noted.   Timbo Ochoa was advised to proceed to UC with new/worseneing symptoms, and with additional episodes of blood in the stool.

## 2022-05-16 NOTE — TELEPHONE ENCOUNTER
S/w Vanessa. On Saturday 5/14 she had one episode of blood in stool   Sts she experienced this once last month but then began her period the following day, she attributed this blood to be r/t her period  Not currently menstruating     On 5/14, blood was bright red and \"filled up the toilet bowl\". Stool was formed. Denies pain or difficulty with passing stool  Denies abd pain  Denies history of hemorrhoids     Has been having regular BMs since, with no visible blood in stool. Advised that likely OK to keep appt on 5/18 as symptoms have improved. Instructed to continue monitoring and if she is experiencing any new or worsening symptoms, blood in stool, she should proceed to UC immediately. [facility] voices understanding to these recommendations. Asking if OK to keep appt on 5/18?

## 2022-05-18 ENCOUNTER — OFFICE VISIT (OUTPATIENT)
Dept: FAMILY MEDICINE CLINIC | Facility: CLINIC | Age: 32
End: 2022-05-18
Payer: COMMERCIAL

## 2022-05-18 VITALS
BODY MASS INDEX: 23.55 KG/M2 | HEART RATE: 96 BPM | RESPIRATION RATE: 20 BRPM | HEIGHT: 62 IN | DIASTOLIC BLOOD PRESSURE: 68 MMHG | WEIGHT: 128 LBS | SYSTOLIC BLOOD PRESSURE: 102 MMHG | TEMPERATURE: 98 F

## 2022-05-18 DIAGNOSIS — K92.1 BLOOD IN STOOL: ICD-10-CM

## 2022-05-18 DIAGNOSIS — K64.8 INTERNAL HEMORRHOIDS: Primary | ICD-10-CM

## 2022-05-18 PROCEDURE — 3074F SYST BP LT 130 MM HG: CPT | Performed by: STUDENT IN AN ORGANIZED HEALTH CARE EDUCATION/TRAINING PROGRAM

## 2022-05-18 PROCEDURE — 3008F BODY MASS INDEX DOCD: CPT | Performed by: STUDENT IN AN ORGANIZED HEALTH CARE EDUCATION/TRAINING PROGRAM

## 2022-05-18 PROCEDURE — 3078F DIAST BP <80 MM HG: CPT | Performed by: STUDENT IN AN ORGANIZED HEALTH CARE EDUCATION/TRAINING PROGRAM

## 2022-05-18 PROCEDURE — 99214 OFFICE O/P EST MOD 30 MIN: CPT | Performed by: STUDENT IN AN ORGANIZED HEALTH CARE EDUCATION/TRAINING PROGRAM

## 2022-06-29 ENCOUNTER — HOSPITAL ENCOUNTER (OUTPATIENT)
Age: 32
Discharge: HOME | End: 2022-06-29
Payer: COMMERCIAL

## 2022-06-29 VITALS
HEIGHT: 62 IN | SYSTOLIC BLOOD PRESSURE: 111 MMHG | RESPIRATION RATE: 18 BRPM | OXYGEN SATURATION: 98 % | BODY MASS INDEX: 23.55 KG/M2 | DIASTOLIC BLOOD PRESSURE: 65 MMHG | WEIGHT: 128 LBS | HEART RATE: 74 BPM | TEMPERATURE: 98 F

## 2022-06-29 DIAGNOSIS — R11.0 NAUSEA: ICD-10-CM

## 2022-06-29 DIAGNOSIS — R19.7 DIARRHEA, UNSPECIFIED TYPE: Primary | ICD-10-CM

## 2022-06-29 DIAGNOSIS — E86.0 DEHYDRATION: ICD-10-CM

## 2022-06-29 LAB
#MXD IC: 0.6 X10ˆ3/UL (ref 0.1–1)
B-HCG UR QL: NEGATIVE
BUN BLD-MCNC: 13 MG/DL (ref 7–18)
CHLORIDE BLD-SCNC: 107 MMOL/L (ref 98–112)
CO2 BLD-SCNC: 22 MMOL/L (ref 21–32)
CREAT BLD-MCNC: 0.5 MG/DL
GLUCOSE BLD-MCNC: 96 MG/DL (ref 70–99)
HCT VFR BLD AUTO: 35.7 %
HCT VFR BLD CALC: 34 %
HGB BLD-MCNC: 12.5 G/DL
ISTAT IONIZED CALCIUM FOR CHEM 8: 0.98 MMOL/L (ref 1.12–1.32)
LYMPHOCYTES # BLD AUTO: 1.5 X10ˆ3/UL (ref 1–4)
LYMPHOCYTES NFR BLD AUTO: 24.6 %
MCH RBC QN AUTO: 31.5 PG (ref 26–34)
MCHC RBC AUTO-ENTMCNC: 35 G/DL (ref 31–37)
MCV RBC AUTO: 89.9 FL (ref 80–100)
MIXED CELL %: 9.9 %
NEUTROPHILS # BLD AUTO: 3.9 X10ˆ3/UL (ref 1.5–7.7)
NEUTROPHILS NFR BLD AUTO: 65.5 %
PLATELET # BLD AUTO: 237 X10ˆ3/UL (ref 150–450)
POCT BILIRUBIN URINE: NEGATIVE
POCT GLUCOSE URINE: NEGATIVE MG/DL
POCT KETONE URINE: 40 MG/DL
POCT LEUKOCYTE ESTERASE URINE: NEGATIVE
POCT NITRITE URINE: NEGATIVE
POCT PH URINE: 6.5 (ref 5–8)
POCT PROTEIN URINE: NEGATIVE MG/DL
POCT SPECIFIC GRAVITY URINE: 1.03
POCT URINE CLARITY: CLEAR
POCT URINE COLOR: YELLOW
POCT UROBILINOGEN URINE: 0.2 MG/DL
POTASSIUM BLD-SCNC: 4.1 MMOL/L (ref 3.6–5.1)
RBC # BLD AUTO: 3.97 X10ˆ6/UL
SARS-COV-2 RNA RESP QL NAA+PROBE: NOT DETECTED
SODIUM BLD-SCNC: 138 MMOL/L (ref 136–145)
WBC # BLD AUTO: 6 X10ˆ3/UL (ref 4–11)

## 2022-06-29 PROCEDURE — 99214 OFFICE O/P EST MOD 30 MIN: CPT

## 2022-06-29 PROCEDURE — 81002 URINALYSIS NONAUTO W/O SCOPE: CPT | Performed by: PHYSICIAN ASSISTANT

## 2022-06-29 PROCEDURE — 80047 BASIC METABLC PNL IONIZED CA: CPT

## 2022-06-29 PROCEDURE — 85025 COMPLETE CBC W/AUTO DIFF WBC: CPT | Performed by: PHYSICIAN ASSISTANT

## 2022-06-29 PROCEDURE — 99215 OFFICE O/P EST HI 40 MIN: CPT

## 2022-06-29 PROCEDURE — 96360 HYDRATION IV INFUSION INIT: CPT

## 2022-06-29 PROCEDURE — 81025 URINE PREGNANCY TEST: CPT

## 2022-06-29 RX ORDER — SODIUM CHLORIDE 9 MG/ML
1000 INJECTION, SOLUTION INTRAVENOUS ONCE
Status: COMPLETED | OUTPATIENT
Start: 2022-06-29 | End: 2022-06-29

## 2022-06-29 RX ORDER — ONDANSETRON 4 MG/1
4 TABLET, ORALLY DISINTEGRATING ORAL EVERY 4 HOURS PRN
Qty: 10 TABLET | Refills: 0 | Status: SHIPPED | OUTPATIENT
Start: 2022-06-29 | End: 2022-07-06

## 2022-06-29 NOTE — ED INITIAL ASSESSMENT (HPI)
c/o diarrheal stool since Sunday, took Imodium on Monday with some relief. Today with nausea and headache.

## 2022-06-30 ENCOUNTER — LAB ENCOUNTER (OUTPATIENT)
Dept: LAB | Facility: HOSPITAL | Age: 32
End: 2022-06-30
Attending: PHYSICIAN ASSISTANT
Payer: COMMERCIAL

## 2022-07-08 ENCOUNTER — LAB ENCOUNTER (OUTPATIENT)
Dept: LAB | Age: 32
End: 2022-07-08
Attending: FAMILY MEDICINE
Payer: COMMERCIAL

## 2022-07-08 ENCOUNTER — OFFICE VISIT (OUTPATIENT)
Dept: FAMILY MEDICINE CLINIC | Facility: CLINIC | Age: 32
End: 2022-07-08
Payer: COMMERCIAL

## 2022-07-08 VITALS
BODY MASS INDEX: 23.19 KG/M2 | HEART RATE: 84 BPM | HEIGHT: 62 IN | WEIGHT: 126 LBS | DIASTOLIC BLOOD PRESSURE: 86 MMHG | SYSTOLIC BLOOD PRESSURE: 128 MMHG | TEMPERATURE: 98 F | RESPIRATION RATE: 16 BRPM

## 2022-07-08 DIAGNOSIS — R19.7 DIARRHEA OF PRESUMED INFECTIOUS ORIGIN: Primary | ICD-10-CM

## 2022-07-08 DIAGNOSIS — R19.7 DIARRHEA OF PRESUMED INFECTIOUS ORIGIN: ICD-10-CM

## 2022-07-08 PROCEDURE — 82785 ASSAY OF IGE: CPT

## 2022-07-08 PROCEDURE — 3079F DIAST BP 80-89 MM HG: CPT | Performed by: FAMILY MEDICINE

## 2022-07-08 PROCEDURE — 86003 ALLG SPEC IGE CRUDE XTRC EA: CPT

## 2022-07-08 PROCEDURE — 3008F BODY MASS INDEX DOCD: CPT | Performed by: FAMILY MEDICINE

## 2022-07-08 PROCEDURE — 99214 OFFICE O/P EST MOD 30 MIN: CPT | Performed by: FAMILY MEDICINE

## 2022-07-08 PROCEDURE — 3074F SYST BP LT 130 MM HG: CPT | Performed by: FAMILY MEDICINE

## 2022-07-09 ENCOUNTER — LAB ENCOUNTER (OUTPATIENT)
Dept: LAB | Facility: HOSPITAL | Age: 32
End: 2022-07-09
Attending: FAMILY MEDICINE
Payer: COMMERCIAL

## 2022-07-09 DIAGNOSIS — R19.7 DIARRHEA OF PRESUMED INFECTIOUS ORIGIN: ICD-10-CM

## 2022-07-09 DIAGNOSIS — R11.0 NAUSEA: ICD-10-CM

## 2022-07-09 DIAGNOSIS — R19.7 DIARRHEA, UNSPECIFIED TYPE: ICD-10-CM

## 2022-07-09 LAB
CRYPTOSP AG STL QL IA: NEGATIVE
G LAMBLIA AG STL QL IA: NEGATIVE

## 2022-07-09 PROCEDURE — 87272 CRYPTOSPORIDIUM AG IF: CPT

## 2022-07-09 PROCEDURE — 87329 GIARDIA AG IA: CPT

## 2022-07-09 PROCEDURE — 87209 SMEAR COMPLEX STAIN: CPT

## 2022-07-09 PROCEDURE — 87177 OVA AND PARASITES SMEARS: CPT

## 2022-07-11 LAB
CLAM IGE QN: <0.1 KUA/L (ref ?–0.1)
CODFISH IGE QN: <0.1 KUA/L (ref ?–0.1)
CORN IGE QN: <0.1 KUA/L (ref ?–0.1)
COW MILK IGE QN: <0.1 KUA/L (ref ?–0.1)
EGG WHITE IGE QN: <0.1 KUA/L (ref ?–0.1)
IGE SERPL-ACNC: 60.2 KU/L (ref 2–214)
PEANUT IGE QN: 0.27 KUA/L (ref ?–0.1)
SCALLOP IGE QN: <0.1 KUA/L (ref ?–0.1)
SESAME SEED IGE QN: <0.1 KUA/L (ref ?–0.1)
SHRIMP IGE QN: <0.1 KUA/L (ref ?–0.1)
SOYBEAN IGE QN: <0.1 KUA/L (ref ?–0.1)
WALNUT IGE QN: <0.1 KUA/L (ref ?–0.1)
WHEAT IGE QN: 0.12 KUA/L (ref ?–0.1)

## 2022-07-17 LAB — OVA AND PARASITE, FECAL INTERPRETATION: NEGATIVE

## 2022-08-06 ENCOUNTER — IMMUNIZATION (OUTPATIENT)
Dept: LAB | Age: 32
End: 2022-08-06
Attending: EMERGENCY MEDICINE
Payer: COMMERCIAL

## 2022-08-06 DIAGNOSIS — Z23 NEED FOR VACCINATION: Primary | ICD-10-CM

## 2022-08-06 PROCEDURE — 0054A SARSCOV2 VAC 30MCG TRS SUCR: CPT

## 2022-10-04 ENCOUNTER — LAB ENCOUNTER (OUTPATIENT)
Dept: LAB | Facility: HOSPITAL | Age: 32
End: 2022-10-04
Attending: STUDENT IN AN ORGANIZED HEALTH CARE EDUCATION/TRAINING PROGRAM
Payer: COMMERCIAL

## 2022-10-04 DIAGNOSIS — D35.2 PITUITARY MICROADENOMA WITH HYPERPROLACTINEMIA (HCC): ICD-10-CM

## 2022-10-04 DIAGNOSIS — E22.9 PITUITARY MICROADENOMA WITH HYPERPROLACTINEMIA (HCC): ICD-10-CM

## 2022-10-04 LAB — PROLACTIN SERPL-MCNC: 31.1 NG/ML

## 2022-10-04 PROCEDURE — 84146 ASSAY OF PROLACTIN: CPT

## 2022-10-04 PROCEDURE — 36415 COLL VENOUS BLD VENIPUNCTURE: CPT

## 2022-10-19 ENCOUNTER — OFFICE VISIT (OUTPATIENT)
Dept: ENDOCRINOLOGY CLINIC | Facility: CLINIC | Age: 32
End: 2022-10-19
Payer: COMMERCIAL

## 2022-10-19 VITALS — DIASTOLIC BLOOD PRESSURE: 76 MMHG | SYSTOLIC BLOOD PRESSURE: 116 MMHG | HEART RATE: 90 BPM

## 2022-10-19 DIAGNOSIS — D35.2 PITUITARY MICROADENOMA (HCC): Primary | ICD-10-CM

## 2022-10-19 DIAGNOSIS — Z86.39 HISTORY OF HYPERPROLACTINEMIA: ICD-10-CM

## 2022-10-19 PROCEDURE — 3078F DIAST BP <80 MM HG: CPT | Performed by: STUDENT IN AN ORGANIZED HEALTH CARE EDUCATION/TRAINING PROGRAM

## 2022-10-19 PROCEDURE — 99214 OFFICE O/P EST MOD 30 MIN: CPT | Performed by: STUDENT IN AN ORGANIZED HEALTH CARE EDUCATION/TRAINING PROGRAM

## 2022-10-19 PROCEDURE — 3074F SYST BP LT 130 MM HG: CPT | Performed by: STUDENT IN AN ORGANIZED HEALTH CARE EDUCATION/TRAINING PROGRAM

## 2022-10-21 ENCOUNTER — LAB ENCOUNTER (OUTPATIENT)
Dept: LAB | Age: 32
End: 2022-10-21
Attending: INTERNAL MEDICINE
Payer: COMMERCIAL

## 2022-10-21 DIAGNOSIS — Z01.818 PRE-OP TESTING: ICD-10-CM

## 2022-10-23 LAB — SARS-COV-2 RNA RESP QL NAA+PROBE: DETECTED

## 2022-11-10 ENCOUNTER — HOSPITAL ENCOUNTER (EMERGENCY)
Facility: HOSPITAL | Age: 32
Discharge: STILL ADMITTED OR ADMINISTRATIVE | End: 2022-11-10
Payer: COMMERCIAL

## 2022-11-10 ENCOUNTER — OFFICE VISIT (OUTPATIENT)
Dept: FAMILY MEDICINE CLINIC | Facility: CLINIC | Age: 32
End: 2022-11-10
Payer: COMMERCIAL

## 2022-11-10 VITALS
HEART RATE: 102 BPM | HEIGHT: 62 IN | RESPIRATION RATE: 16 BRPM | TEMPERATURE: 98 F | SYSTOLIC BLOOD PRESSURE: 124 MMHG | BODY MASS INDEX: 23.37 KG/M2 | OXYGEN SATURATION: 97 % | WEIGHT: 127 LBS | DIASTOLIC BLOOD PRESSURE: 81 MMHG

## 2022-11-10 VITALS — HEIGHT: 62 IN | WEIGHT: 127 LBS | BODY MASS INDEX: 23.37 KG/M2

## 2022-11-10 DIAGNOSIS — M54.9 UPPER BACK PAIN: ICD-10-CM

## 2022-11-10 DIAGNOSIS — R10.11 RUQ PAIN: Primary | ICD-10-CM

## 2022-11-10 DIAGNOSIS — R10.13 EPIGASTRIC PAIN: ICD-10-CM

## 2022-11-10 DIAGNOSIS — R10.829 REBOUND TENDERNESS: ICD-10-CM

## 2022-11-10 PROCEDURE — 3008F BODY MASS INDEX DOCD: CPT | Performed by: STUDENT IN AN ORGANIZED HEALTH CARE EDUCATION/TRAINING PROGRAM

## 2022-11-10 PROCEDURE — 99214 OFFICE O/P EST MOD 30 MIN: CPT | Performed by: STUDENT IN AN ORGANIZED HEALTH CARE EDUCATION/TRAINING PROGRAM

## 2022-11-10 NOTE — ED INITIAL ASSESSMENT (HPI)
Patient reports back pain that radiates to RUQ since last night. Seen at PCP, referred here. Denies N/V, fever, urinary/bowel symptoms.  Hx \"bowel issues\"

## 2022-11-21 ENCOUNTER — MED REC SCAN ONLY (OUTPATIENT)
Dept: FAMILY MEDICINE CLINIC | Facility: CLINIC | Age: 32
End: 2022-11-21

## 2022-12-19 ENCOUNTER — OFFICE VISIT (OUTPATIENT)
Dept: FAMILY MEDICINE CLINIC | Facility: CLINIC | Age: 32
End: 2022-12-19
Payer: COMMERCIAL

## 2022-12-19 VITALS — WEIGHT: 129 LBS | HEIGHT: 62 IN | BODY MASS INDEX: 23.74 KG/M2

## 2022-12-19 DIAGNOSIS — Z32.02 URINE PREGNANCY TEST NEGATIVE: ICD-10-CM

## 2022-12-19 DIAGNOSIS — N30.01 ACUTE CYSTITIS WITH HEMATURIA: Primary | ICD-10-CM

## 2022-12-19 DIAGNOSIS — R30.0 DYSURIA: ICD-10-CM

## 2022-12-19 LAB
APPEARANCE: CLEAR
BILIRUBIN: NEGATIVE
CONTROL LINE PRESENT WITH A CLEAR BACKGROUND (YES/NO): YES YES/NO
GLUCOSE (URINE DIPSTICK): NEGATIVE MG/DL
KETONES (URINE DIPSTICK): NEGATIVE MG/DL
MULTISTIX LOT#: ABNORMAL NUMERIC
NITRITE, URINE: NEGATIVE
PH, URINE: 7 (ref 4.5–8)
PREGNANCY TEST, URINE: NEGATIVE
PROTEIN (URINE DIPSTICK): NEGATIVE MG/DL
SPECIFIC GRAVITY: 1.01 (ref 1–1.03)
URINE-COLOR: YELLOW
UROBILINOGEN,SEMI-QN: 0.2 MG/DL (ref 0–1.9)

## 2022-12-19 PROCEDURE — 87086 URINE CULTURE/COLONY COUNT: CPT | Performed by: STUDENT IN AN ORGANIZED HEALTH CARE EDUCATION/TRAINING PROGRAM

## 2022-12-19 RX ORDER — CIPROFLOXACIN 500 MG/1
500 TABLET, FILM COATED ORAL 2 TIMES DAILY
Qty: 14 TABLET | Refills: 0 | Status: SHIPPED | OUTPATIENT
Start: 2022-12-19 | End: 2022-12-26

## 2022-12-20 LAB
AMB EXT GLUCOSE URINE: NEGATIVE
AMB EXT GLUCOSE URINE: NEGATIVE
AMB EXT KETONES URINE: NEGATIVE
AMB EXT URINE CLARITY: CLEAR
AMB EXT URINE CLARITY: CLEAR
AMB EXT URINE SPECIFIC GRAVITY: 1.01
AMB EXT URINE SPECIFIC GRAVITY: 1.01

## 2023-01-20 ENCOUNTER — LAB ENCOUNTER (OUTPATIENT)
Dept: LAB | Age: 33
End: 2023-01-20
Attending: INTERNAL MEDICINE
Payer: COMMERCIAL

## 2023-01-20 DIAGNOSIS — Z01.818 PRE-OP TESTING: ICD-10-CM

## 2023-01-21 LAB — SARS-COV-2 RNA RESP QL NAA+PROBE: NOT DETECTED

## 2023-01-23 PROBLEM — R14.1 ABDOMINAL GAS PAIN: Status: ACTIVE | Noted: 2023-01-23

## 2023-01-23 PROBLEM — R10.11 RIGHT UPPER QUADRANT PAIN: Status: ACTIVE | Noted: 2023-01-23

## 2023-01-23 PROBLEM — R19.7 DIARRHEA, UNSPECIFIED: Status: ACTIVE | Noted: 2023-01-23

## 2023-01-23 PROBLEM — K21.9 GASTROESOPHAGEAL REFLUX DISEASE WITHOUT ESOPHAGITIS: Status: ACTIVE | Noted: 2023-01-23

## 2023-01-23 PROBLEM — K92.1 HEMATOCHEZIA: Status: ACTIVE | Noted: 2023-01-23

## 2023-01-23 PROBLEM — R12 CHRONIC HEARTBURN: Status: ACTIVE | Noted: 2023-01-23

## 2023-01-23 PROBLEM — K29.30 CHRONIC SUPERFICIAL GASTRITIS WITHOUT BLEEDING: Status: ACTIVE | Noted: 2023-01-23

## 2023-02-01 ENCOUNTER — TELEPHONE (OUTPATIENT)
Dept: ALLERGY | Facility: CLINIC | Age: 33
End: 2023-02-01

## 2023-02-01 NOTE — TELEPHONE ENCOUNTER
Spoke with patient. Verified name and . Informed patient if she can not be off allergy medications, Dr. Blanco Valentine will be happy to see her but she will not be able to skin test. Informed patient Dr. Blanco Valentine will assess her and may order labs if needed and to please arrive 15 minutes prior to appt. Due to parking. Patient verbalizes understanding, no further questions at this time.

## 2023-02-01 NOTE — TELEPHONE ENCOUNTER
Patient is scheduled for consults appointment on 3/13/23 and mentions unable to stop antihistamines because of chronic idiopathic hives. Patient takes:    Allegra  Zyrtec  Benadryl. Please advise.

## 2023-02-26 ENCOUNTER — LAB ENCOUNTER (OUTPATIENT)
Dept: LAB | Facility: HOSPITAL | Age: 33
End: 2023-02-26
Attending: STUDENT IN AN ORGANIZED HEALTH CARE EDUCATION/TRAINING PROGRAM
Payer: COMMERCIAL

## 2023-02-26 DIAGNOSIS — D35.2 PITUITARY MICROADENOMA (HCC): ICD-10-CM

## 2023-02-26 LAB
CORTIS SERPL-MCNC: 16.6 UG/DL
ESTRADIOL SERPL-MCNC: 113.4 PG/ML
FSH SERPL-ACNC: 3 MIU/ML
LH SERPL-ACNC: 2.7 MIU/ML
PROLACTIN SERPL-MCNC: 29.7 NG/ML
T4 FREE SERPL-MCNC: 0.9 NG/DL (ref 0.8–1.7)
TSI SER-ACNC: 1.51 MIU/ML (ref 0.36–3.74)

## 2023-02-26 PROCEDURE — 82024 ASSAY OF ACTH: CPT

## 2023-02-26 PROCEDURE — 84146 ASSAY OF PROLACTIN: CPT

## 2023-02-26 PROCEDURE — 82533 TOTAL CORTISOL: CPT

## 2023-02-26 PROCEDURE — 84443 ASSAY THYROID STIM HORMONE: CPT

## 2023-02-26 PROCEDURE — 83002 ASSAY OF GONADOTROPIN (LH): CPT

## 2023-02-26 PROCEDURE — 84439 ASSAY OF FREE THYROXINE: CPT

## 2023-02-26 PROCEDURE — 82670 ASSAY OF TOTAL ESTRADIOL: CPT

## 2023-02-26 PROCEDURE — 36415 COLL VENOUS BLD VENIPUNCTURE: CPT

## 2023-02-26 PROCEDURE — 84305 ASSAY OF SOMATOMEDIN: CPT

## 2023-02-26 PROCEDURE — 83001 ASSAY OF GONADOTROPIN (FSH): CPT

## 2023-03-01 LAB
ADRENOCORTICOTROPIC HORMONE: 21 PG/ML
IGF 1 Z SCORE CALCULATION: 0.3
IGF-1 (INSULINE-LIKE GROWTH FACTOR 1): 190 NG/ML

## 2023-03-07 ENCOUNTER — LAB ENCOUNTER (OUTPATIENT)
Dept: LAB | Age: 33
End: 2023-03-07
Attending: ALLERGY & IMMUNOLOGY
Payer: COMMERCIAL

## 2023-03-07 ENCOUNTER — OFFICE VISIT (OUTPATIENT)
Dept: ALLERGY | Facility: CLINIC | Age: 33
End: 2023-03-07

## 2023-03-07 VITALS
HEIGHT: 62 IN | BODY MASS INDEX: 23.55 KG/M2 | DIASTOLIC BLOOD PRESSURE: 80 MMHG | WEIGHT: 128 LBS | OXYGEN SATURATION: 99 % | HEART RATE: 88 BPM | SYSTOLIC BLOOD PRESSURE: 122 MMHG

## 2023-03-07 DIAGNOSIS — H10.10 SEASONAL AND PERENNIAL ALLERGIC RHINOCONJUNCTIVITIS: ICD-10-CM

## 2023-03-07 DIAGNOSIS — Z91.018 FOOD ALLERGY: Primary | ICD-10-CM

## 2023-03-07 DIAGNOSIS — Z23 FLU VACCINE NEED: ICD-10-CM

## 2023-03-07 DIAGNOSIS — L50.1 CHRONIC IDIOPATHIC URTICARIA: ICD-10-CM

## 2023-03-07 DIAGNOSIS — Z92.29 COVID-19 VACCINE SERIES COMPLETED: ICD-10-CM

## 2023-03-07 DIAGNOSIS — J30.89 SEASONAL AND PERENNIAL ALLERGIC RHINOCONJUNCTIVITIS: ICD-10-CM

## 2023-03-07 DIAGNOSIS — J30.2 SEASONAL AND PERENNIAL ALLERGIC RHINOCONJUNCTIVITIS: ICD-10-CM

## 2023-03-07 PROCEDURE — 99204 OFFICE O/P NEW MOD 45 MIN: CPT | Performed by: ALLERGY & IMMUNOLOGY

## 2023-03-07 PROCEDURE — 3074F SYST BP LT 130 MM HG: CPT | Performed by: ALLERGY & IMMUNOLOGY

## 2023-03-07 PROCEDURE — 3008F BODY MASS INDEX DOCD: CPT | Performed by: ALLERGY & IMMUNOLOGY

## 2023-03-07 PROCEDURE — 36415 COLL VENOUS BLD VENIPUNCTURE: CPT

## 2023-03-07 PROCEDURE — 3079F DIAST BP 80-89 MM HG: CPT | Performed by: ALLERGY & IMMUNOLOGY

## 2023-03-07 PROCEDURE — 83520 IMMUNOASSAY QUANT NOS NONAB: CPT

## 2023-03-07 NOTE — PATIENT INSTRUCTIONS
Unable to skin test as patient has a history of chronic urticaria and currently on antihistamines     #1 Food allergies  Reviewed prior serum IgE testing to common food allergens from July 2022. Low-grade IgE production to peanuts and wheat. Patient currently avoiding peanut to his due to symptoms of itchy mouth in the past.  No other foods by history have caused an acute reaction to the food with hives rashes lip swelling tongue swelling or respiratory issues  Patient defers for testing to this common food allergy panel at this time. Continue to avoid peanut. Patient can be posted of any new food triggers by history  Benadryl as needed    #2 chronic idiopathic urticaria  Handouts on CIU provided and reviewed. Check tryptase level  Zyrtec/cetirizine 10 mg or Xyzal 5 mg up to 4 times per day  Consider Xolair if refractory. Patient to contact my office if interested in pursuing Xolair. #3 COVID vaccinations up-to-date    #4 flu vaccine recommended in the fall    #5 allergic rhinitis  Reviewed prior serum IgE testing from 2021 with her previous allergist provider showing positive to cat grass weeds and ragweed.   Reviewed avoidance measures and potential treatment option of immunotherapy  Continue with Zyrtec as an antihistamine  May add Flonase or Nasacort 2 sprays per nostril once a day if having prominent nasal congestion postnasal drip  Consider Singulair if refractory       Orders This Visit:  Orders Placed This Encounter      Adult Food Allergy Prof      Tryptase

## 2023-03-09 LAB — TRYPTASE: 3.9 UG/L

## 2023-03-13 ENCOUNTER — TELEPHONE (OUTPATIENT)
Dept: ALLERGY | Facility: CLINIC | Age: 33
End: 2023-03-13

## 2023-03-13 NOTE — TELEPHONE ENCOUNTER
----- Message from Rosalba Anderson MD sent at 3/10/2023 10:13 AM CST -----  Please contact patient with normal tryptase level 3.9

## 2023-08-21 ENCOUNTER — OFFICE VISIT (OUTPATIENT)
Dept: PHYSICAL THERAPY | Age: 33
End: 2023-08-21
Attending: NURSE PRACTITIONER
Payer: COMMERCIAL

## 2023-08-21 ENCOUNTER — TELEPHONE (OUTPATIENT)
Dept: PHYSICAL THERAPY | Facility: HOSPITAL | Age: 33
End: 2023-08-21

## 2023-08-21 DIAGNOSIS — K59.09 CHRONIC CONSTIPATION: Primary | ICD-10-CM

## 2023-08-21 DIAGNOSIS — M62.89 PELVIC FLOOR DYSFUNCTION IN FEMALE: ICD-10-CM

## 2023-08-21 PROCEDURE — 97530 THERAPEUTIC ACTIVITIES: CPT

## 2023-08-21 PROCEDURE — 97162 PT EVAL MOD COMPLEX 30 MIN: CPT

## 2023-08-21 NOTE — PROGRESS NOTES
MUSCULOSKELETAL AND PELVIC FLOOR EVALUATION:     Diagnosis:   Chronic constipation  Pelvic floor dysfunction in female      Referring Provider: Isaiah Currie  Date of Evaluation:    8/21/2023    Precautions:  None Next MD visit:   none scheduled  Date of Surgery: Robin Thompson is a 34year old female  who presents to therapy today with complaints of chronic diarrhea or constipation also have urge and stress incontinence symptoms. Pt states she had history of diarrhea for months , then followed by constipation started last year. She has tried linzess but was not doing well and have tried taking metamucil but would cause stomach pain, so currently taking  truelance which seem helping her. Current symptoms include: back pain, abdominal pain, rectal pain, and constipation, urinary incomplete emptying,     Pt describes pain level: current 0/10, best 0/10, worst 8/10. LBP   Quality: dull and aching  back or abdominal pain     Pregnant Now: No  Obstetrical/Gynecological history: Last menstrual period: Started 8/17/2023  Manometry: Testing was done. Occupation/Activities: work from home , infront of computer   PFDI-20: 144.79 /300     Pt goals include : To improve her symptoms to be able to function better    Past medical history was reviewed with [facility]. Significant findings include fibromyalgia , chronic back pain w/ history of work injury,hemorrhoids, chronic fatigue,abdominal pain,irregular bowel habits,depression,Hx of depression,anxiety,arthritis,headache disorder      URINARY HABITS  Types of symptoms: stress incontinence, urge incontinence, incomplete emptying, and nocturia  Events associated with the onset of urinary complaints: extenral stressors, emotional/phsychological state  Abdominal/Vaginal Pressure complaints: occassional/intermittent  Urinary Frequency: will fill out bladder diary.   Leaking occurs: cough or sneez  Amount of leakage: min  Pad use: No  Pad Change frequency: N/A  Bladder irritants: Cola/ sodas   Post void dribble: Yes  Hovering: Yes  Empty bladder just in case: yes  Do you ever leak urine without knowing it? No     BOWEL HABITS  Types of symptoms: Constipation and other diarrhea     Frequency of bowel movements: every other day   Stool consistency: [place] Stool Scale: 6 & 7   Do you strain with defecation: Yes when   Laxative use: Yes (Truelance)    SEXUAL HEALTH STATUS  Marinoff Scale: N/A  History of Sexual Abuse:No  Sexual [place] Status: Active   Pain with initial and/or deep penetration: yes       [address] presents to physical therapy evaluation with primary c/o constipation or diarrhea and urinary incontinence. The results of the objective tests and measures show decreased lumbar spine mobility, and presence of pelvic obliquity,  core stability and hip muscles flexbility. Pelvic floor external and internal assessment will be done in subsequent visits. Signs and symptoms are consistent with diagnosis of Pelvic floor dysfunction. Pt and PT discussed evaluation findings, pathology, POC and HEP. Pt voiced understanding and performs HEP correctly without reported pain. Skilled Pelvic Physical Therapy is medically necessary to address the above impairments and reach functional goals. OBJECTIVE:   Posture: Decreased postural awareness and control seated in slouched position but able to correct with cues  Pelvic Alignment:  (+) pelvic obliquity , uneven ASIS L lower than R    Gait: pt ambulates on level ground with normal mechanics. Range Of Motion  Lumbar AROM screen: 50% flexion, 25% extension, lat flex 25% B,rotation  50% B  LE AROM screen: grossly WNL.     Strength (MMT) will assess LE next session  Transverse Abdominis: 3-/5    Flexibility Summary: WNL BOUBACAR LE except     Special Tests  (+) Supine to long sitting , L ant rotation , R post rotation of ilium    Informed consent for internal pelvic evaluation given: Yes will be done in subsequent visits  Today's Treatment and Response:   Patient education was provided on objective findings of external and internal evaluation and expectations with treatment outcomes. Educated on bladder normatives, adequate hydration levels, proper toileting posture, instructed in bladder, bowel, and diet diary log and issued handout , stress/urge urinary incontinence strategies, diaphragmatic breathing for PNS activation and pelvic floor relaxation , coordination of diaphragmatic breathing and pelvic floor contraction , and knack/pelvic muscle brace    Patient was instructed in and issued a HEP for: diet/bladder/bowel diary ,diaphragmatic breathing,dietary modification,fiber/fluid intake, toilet positioning, stress management and stretching exercises on lumbopelvic & hip. Charges: PT Eval Moderate Complexity,       Total Timed Treatment: 10 min     Total Treatment Time: 45 min     Based on clinical rationale and outcome measures, this evaluation involved Moderate Complexity decision making due to 1-2 personal factors/comorbidities, 3 body structures involved/activity limitations, and evolving symptoms including stress urinary incontinence, urge urinary incontinence, and constipation/diarhhea  PLAN OF CARE:    Goals: (to be met in 10 visits)  1. The patient  to be educated on pelvic health , role of PT , therapeutic managements & goals as well as strategies for to manage symptoms at home, discussed bladder/bowel diary and home exercises program.  2.The patient to improve awareness of PFM and able to improve ability to contract and relax for full range with coordinated contraction of diaphragm and TrA  3. The patient to report improved urinary incontinence symptoms with good ability to pelvic brace and no leaking with functional activities. 4.The patient to report improved  bowel habits to daily with good bristol scale to 3 or 4. 5. The patient to improve overall with symptoms and function with PFDI-20 score improved to <100 decreasing PFM dysfunction  6. The patient to demonstrate good compliance with the program and able to progress with  core stabilization exercises without  leaking or difficulty . Frequency / Duration: Patient will be seen for 1 x/week or a total of 10 visits over a 90 day period. Treatment will include: Manual Therapy, Neuromuscular Re-education, Therapeutic Activities, Therapeutic Exercise, Home Exercise Program instruction, and biofeedback      Education or treatment limitation: None  Rehab Potential:good      Patient/Family/Caregiver was advised of these findings, precautions, and treatment options and has agreed to actively participate in planning and for this course of care. Thank you for your referral. Please co-sign or sign and return this letter via fax as soon as possible to [phone]. If you have any questions, please contact me at Dept: [phone]    Sincerely,  Electronically signed by therapist: Carine Couch PT  [de-identified] certification required: Yes  I certify the need for these services furnished under this plan of treatment and while under my care.     X___________________________________________________ Date____________________    Certification From: 1/59/7959  To:11/19/2023

## 2023-08-30 ENCOUNTER — OFFICE VISIT (OUTPATIENT)
Dept: PHYSICAL THERAPY | Age: 33
End: 2023-08-30
Attending: NURSE PRACTITIONER
Payer: COMMERCIAL

## 2023-08-30 DIAGNOSIS — M62.89 PELVIC FLOOR DYSFUNCTION IN FEMALE: Primary | ICD-10-CM

## 2023-08-30 PROCEDURE — 97140 MANUAL THERAPY 1/> REGIONS: CPT

## 2023-08-30 PROCEDURE — 97110 THERAPEUTIC EXERCISES: CPT

## 2023-08-30 PROCEDURE — 97530 THERAPEUTIC ACTIVITIES: CPT

## 2023-09-11 ENCOUNTER — OFFICE VISIT (OUTPATIENT)
Dept: PHYSICAL THERAPY | Age: 33
End: 2023-09-11
Attending: NURSE PRACTITIONER
Payer: COMMERCIAL

## 2023-09-11 DIAGNOSIS — M62.89 PELVIC FLOOR DYSFUNCTION IN FEMALE: Primary | ICD-10-CM

## 2023-09-11 PROCEDURE — 97140 MANUAL THERAPY 1/> REGIONS: CPT

## 2023-09-11 PROCEDURE — 97110 THERAPEUTIC EXERCISES: CPT

## 2023-09-11 PROCEDURE — 97530 THERAPEUTIC ACTIVITIES: CPT

## 2023-09-11 NOTE — PROGRESS NOTES
Diagnosis:   Chronic constipation  Pelvic floor dysfunction in female       Referring Provider: Yessenia Velazquez  Date of Evaluation:    8/21/2023    Precautions:  None Next MD visit:   none scheduled  Date of Surgery: n/a   Insurance Primary/Secondary: Keshia George / N/A     # Auth Visits: N/A            Subjective: Pt reports she is not going the toilet to urinate as often, she is able to hold/delay voiding with the strategies that was given to her. She mentions that she has been taking benefiber but with no significant difference with the  bowel consistency ,  she also mentions  having  some stomach discomfort prior to bowel movement. Pt also mentions having LBP today prior to the tx. Objective:   Hip add Mod- sig tightness , ROM 25  deg B    Assessment:Patient demonstrates mod-sig hip add tightness, she was in so much pain/discomfort during the passive stretch/manual tx. She has gain some more mobility at the end of session but she felt so sore. Discussed adjustment/modification with supplement to see if it help bulking up the bowel. Trial of benefiber 1 tsp morning and 1stp at night, continue to take trulance every other day. Goals:  (to be met in 10 visits)  1. The patient  to be educated on pelvic health , role of PT , therapeutic managements & goals as well as strategies for to manage symptoms at home, discussed bladder/bowel diary and home exercises program.  2.The patient to improve awareness of PFM and able to improve ability to contract and relax for full range with coordinated contraction of diaphragm and TrA  3. The patient to report improved urinary incontinence symptoms with good ability to pelvic brace and no leaking with functional activities. 4.The patient to report improved  bowel habits to daily with good bristol scale to 3 or 4. 5. The patient to improve overall with symptoms and function with PFDI-20 score improved to <100 decreasing PFM dysfunction  6. The patient to demonstrate good compliance with the program and able to progress with  core stabilization exercises without  leaking or difficulty . Plan: Continue with plan of care to address lumbopelvic & hip limitation of movement and PFM downtraining. Trial of benefiber 1 tsp morning and 1 tsp at night, continue to take trulance every other day. Will try to do internal rectal assessment next visit. Date: 8/30/2023  TX#: 2/10 Date: 9/11/2023           TX#: 3/10 Date:                 TX#: 4/ Date:                 TX#: 5/ Date: Tx#: 6/   Thera Act 12'  Review of bladder/bowel diary  Disscussed modification/increasing fluid and fiber supplement in addition to her meds. Added HEP for lumbopelvic flexibility. Thera Act 10'  Disscussed effects of adding benefiber to her intake and modifying it to 2x a day with trulance every other day. Supine Box breathing/relaxation with hip add stretching      Manual Tx 20'  Abdominal mobilization/colon massage  Static cupping tech to thoracolumbar Manual Tx 23'  Hip add MET , STM/MFR/muscle mobs , alt michele hip abd/add  Sacral PA mobs  Static cupping tech to thoracolumbar      Thera ex:12'  Passive to active HS & piriformis stretch  Open book R/L  LTR stretch R/L  Hip ER/IR stretches   Thera ex:20'  Female chain reaction against the wall calf stretch with hip matrices R/L  Passive hip add & piriformis stretch  Prone passive hip ER/IR stretches to Active motion             HEP: Diaphragmatic breathing/box breathing, Happy baby stretch,adductor stretch    Charges: TheraActx1. TheraEx1,ManualTx2       Total Timed Treatment: 53min  Total Treatment Time: 53 min      Diagnosis:   Chronic constipation  Pelvic floor dysfunction in female      Referring Provider: Jayme Sun  Date of Evaluation:    8/21/2023    Precautions:  None Next MD visit:   none scheduled  Date of Surgery: Karie New is a 34year old female  who presents to therapy today with complaints of chronic diarrhea or constipation also have urge and stress incontinence symptoms. Pt states she had history of diarrhea for months , then followed by constipation started last year. She has tried linzess but was not doing well and have tried taking metamucil but would cause stomach pain, so currently taking  truelance which seem helping her. Current symptoms include: back pain, abdominal pain, rectal pain, and constipation, urinary incomplete emptying,     Pt describes pain level: current 0/10, best 0/10, worst 8/10. LBP   Quality: dull and aching  back or abdominal pain     Pregnant Now: No  Obstetrical/Gynecological history: Last menstrual period: Started 8/17/2023  Manometry: Testing was done. Occupation/Activities: work from home , infront of computer   PFDI-20: 144.79 /300     Pt goals include : To improve her symptoms to be able to function better    Past medical history was reviewed with [facility]. Significant findings include fibromyalgia , chronic back pain w/ history of work injury,hemorrhoids, chronic fatigue,abdominal pain,irregular bowel habits,depression,Hx of depression,anxiety,arthritis,headache disorder      URINARY HABITS  Types of symptoms: stress incontinence, urge incontinence, incomplete emptying, and nocturia  Events associated with the onset of urinary complaints: extenral stressors, emotional/phsychological state  Abdominal/Vaginal Pressure complaints: occassional/intermittent  Urinary Frequency: will fill out bladder diary. Leaking occurs: cough or sneez  Amount of leakage: min  Pad use: No  Pad Change frequency: N/A  Bladder irritants: Cola/ sodas   Post void dribble: Yes  Hovering: Yes  Empty bladder just in case: yes  Do you ever leak urine without knowing it?  No     BOWEL HABITS  Types of symptoms: Constipation and other diarrhea     Frequency of bowel movements: every other day   Stool consistency: [place] Stool Scale: 6 & 7   Do you strain with defecation: Yes when   Laxative use: Yes (Truelance)    SEXUAL HEALTH STATUS  Marinoff Scale: N/A  History of Sexual Abuse:No  Sexual [place] Status: Active   Pain with initial and/or deep penetration: yes      [address] presents to physical therapy evaluation with primary c/o constipation or diarrhea and urinary incontinence. The results of the objective tests and measures show decreased lumbar spine mobility, and presence of pelvic obliquity,  core stability and hip muscles flexbility. Pelvic floor external and internal assessment will be done in subsequent visits. Signs and symptoms are consistent with diagnosis of Pelvic floor dysfunction. Pt and PT discussed evaluation findings, pathology, POC and HEP. Pt voiced understanding and performs HEP correctly without reported pain. Skilled Pelvic Physical Therapy is medically necessary to address the above impairments and reach functional goals. OBJECTIVE:   Posture: Decreased postural awareness and control seated in slouched position but able to correct with cues  Pelvic Alignment:  (+) pelvic obliquity , uneven ASIS L lower than R    Gait: pt ambulates on level ground with normal mechanics. Range Of Motion  Lumbar AROM screen: 50% flexion, 25% extension, lat flex 25% B,rotation  50% B  LE AROM screen: grossly WNL. Strength (MMT) will assess LE next session  Transverse Abdominis: 3-/5    Flexibility Summary: WNL BOUBACAR LE except     Special Tests  (+) Supine to long sitting , L ant rotation , R post rotation of ilium    Informed consent for internal pelvic evaluation given: Yes will be done in subsequent visits  Today's Treatment and Response:   Patient education was provided on objective findings of external and internal evaluation and expectations with treatment outcomes.  Educated on bladder normatives, adequate hydration levels, proper toileting posture, instructed in bladder, bowel, and diet diary log and issued handout , stress/urge urinary incontinence strategies, diaphragmatic breathing for PNS activation and pelvic floor relaxation , coordination of diaphragmatic breathing and pelvic floor contraction , and knack/pelvic muscle brace    Patient was instructed in and issued a HEP for: diet/bladder/bowel diary ,diaphragmatic breathing,dietary modification,fiber/fluid intake, toilet positioning, stress management and stretching exercises on lumbopelvic & hip. Charges: PT Eval Moderate Complexity,       Total Timed Treatment: 10 min     Total Treatment Time: 45 min     Based on clinical rationale and outcome measures, this evaluation involved Moderate Complexity decision making due to 1-2 personal factors/comorbidities, 3 body structures involved/activity limitations, and evolving symptoms including stress urinary incontinence, urge urinary incontinence, and constipation/diarhhea  PLAN OF CARE:    Goals: (to be met in 10 visits)  1. The patient  to be educated on pelvic health , role of PT , therapeutic managements & goals as well as strategies for to manage symptoms at home, discussed bladder/bowel diary and home exercises program.  2.The patient to improve awareness of PFM and able to improve ability to contract and relax for full range with coordinated contraction of diaphragm and TrA  3. The patient to report improved urinary incontinence symptoms with good ability to pelvic brace and no leaking with functional activities. 4.The patient to report improved  bowel habits to daily with good bristol scale to 3 or 4. 5. The patient to improve overall with symptoms and function with PFDI-20 score improved to <100 decreasing PFM dysfunction  6. The patient to demonstrate good compliance with the program and able to progress with  core stabilization exercises without  leaking or difficulty . Frequency / Duration: Patient will be seen for 1 x/week or a total of 10 visits over a 90 day period.   Treatment will include: Manual Therapy, Neuromuscular Re-education, Therapeutic Activities, Therapeutic Exercise, Home Exercise Program instruction, and biofeedback      Education or treatment limitation: None  Rehab Potential:good      Patient/Family/Caregiver was advised of these findings, precautions, and treatment options and has agreed to actively participate in planning and for this course of care. Thank you for your referral. Please co-sign or sign and return this letter via fax as soon as possible to [phone]. If you have any questions, please contact me at Dept: [phone]    Sincerely,  Electronically signed by therapist: Carine Couch PT  [de-identified] certification required: Yes  I certify the need for these services furnished under this plan of treatment and while under my care.     X___________________________________________________ Date____________________    Certification From: 2/04/9804  To:11/19/2023

## 2023-09-20 ENCOUNTER — OFFICE VISIT (OUTPATIENT)
Dept: PHYSICAL THERAPY | Age: 33
End: 2023-09-20
Attending: NURSE PRACTITIONER
Payer: COMMERCIAL

## 2023-09-20 DIAGNOSIS — M62.89 PELVIC FLOOR DYSFUNCTION IN FEMALE: Primary | ICD-10-CM

## 2023-09-20 PROCEDURE — 97140 MANUAL THERAPY 1/> REGIONS: CPT

## 2023-09-20 PROCEDURE — 97530 THERAPEUTIC ACTIVITIES: CPT

## 2023-09-20 NOTE — PROGRESS NOTES
Diagnosis:   Chronic constipation  Pelvic floor dysfunction in female       Referring Provider: Dia Mcallister  Date of Evaluation:    8/21/2023    Precautions:  None Next MD visit:   none scheduled  Date of Surgery: n/a   Insurance Primary/Secondary: [employer] / N/A     # Auth Visits: N/A            Subjective: Pt states she  started her monthly period since sunday and has been having migraine, she reports not having a good day today with high intensity of lower back pain. She has been trying to get to her ideal  water intake. She has a harder bowel ([place] 1 or 2) with benefiber 2x a day  LBP to 7/10  today     Objective:   Hip add Mod  tightness , ROM 25  deg B  to    R 33 deg  L 36 deg  Mod decrease thoracic spine mobility . Assessment:Discussed modification on her benefiber supplementation to 1x a day and observe it bowel consistency will be better but continue with trulance dosage every other day. Today reassessed the hip add length /ROM and she is gaining better flexibility. Today the treatment is mainly to control her lower back pain. She was given added HEP with thoracolumbar mobilization in seated position. Goals:  (to be met in 10 visits)  1. The patient  to be educated on pelvic health , role of PT , therapeutic managements & goals as well as strategies for to manage symptoms at home, discussed bladder/bowel diary and home exercises program.  2.The patient to improve awareness of PFM and able to improve ability to contract and relax for full range with coordinated contraction of diaphragm and TrA  3. The patient to report improved urinary incontinence symptoms with good ability to pelvic brace and no leaking with functional activities. 4.The patient to report improved  bowel habits to daily with good bristol scale to 3 or 4. 5. The patient to improve overall with symptoms and function with PFDI-20 score improved to <100 decreasing PFM dysfunction  6. The patient to demonstrate good compliance with the program and able to progress with  core stabilization exercises without  leaking or difficulty . Plan: Continue with plan of care to address lumbopelvic & hip limitation of movement and PFM downtraining. Will do rectal assessment in subsequent visit. Date: 8/30/2023  TX#: 2/10 Date: 9/11/2023           TX#: 3/10 Date: 9/20/203              TX#: 4/10 Date:                 TX#: 5/ Date: Tx#: 6/   Thera Act 12'  Review of bladder/bowel diary  Disscussed modification/increasing fluid and fiber supplement in addition to her meds. Added HEP for lumbopelvic flexibility. Thera Act 10'  Disscussed effects of adding benefiber to her intake and modifying it to 2x a day with trulance every other day. Supine Box breathing/relaxation with hip add stretching Thera Act 18'  Discussion on modification on fiber supplement. Supine Box breathing/relaxation with hip add stretching  Prone 360 diaphragmatic breathing     Manual Tx 20'  Abdominal mobilization/colon massage  Static cupping tech to thoracolumbar Manual Tx 23'  Hip add MET , STM/MFR/muscle mobs , alt michele hip abd/add  Sacral PA mobs  Static cupping tech to thoracolumbar Manual Tx 25'  Thoracic PA mobs  Trigger point/MFR  Lumbosacral PA mobs  Sliding cup on thoracolumbar paraspinals     Thera ex:12'  Passive to active HS & piriformis stretch  Open book R/L  LTR stretch R/L  Hip ER/IR stretches   Thera ex:20'  Female chain reaction against the wall calf stretch with hip matrices R/L  Passive hip add & piriformis stretch  Prone passive hip ER/IR stretches to Active motion             HEP: Diaphragmatic breathing/box breathing, Happy baby stretch,adductor stretch    Charges:  TheraActx1,,ManualTx2       Total Timed Treatment: 43min  Total Treatment Time: 43 min      Diagnosis:   Chronic constipation  Pelvic floor dysfunction in female      Referring Provider: Alexia Brooke  Date of Evaluation:    8/21/2023    Precautions:  None Next MD visit:   none scheduled  Date of Surgery: n/a Leticia Brandt is a 34year old female  who presents to therapy today with complaints of chronic diarrhea or constipation also have urge and stress incontinence symptoms. Pt states she had history of diarrhea for months , then followed by constipation started last year. She has tried linzess but was not doing well and have tried taking metamucil but would cause stomach pain, so currently taking  truelance which seem helping her. Current symptoms include: back pain, abdominal pain, rectal pain, and constipation, urinary incomplete emptying,     Pt describes pain level: current 0/10, best 0/10, worst 8/10. LBP   Quality: dull and aching  back or abdominal pain     Pregnant Now: No  Obstetrical/Gynecological history: Last menstrual period: Started 8/17/2023  Manometry: Testing was done. Occupation/Activities: work from home , infront of computer   PFDI-20: 144.79 /300     Pt goals include : To improve her symptoms to be able to function better    Past medical history was reviewed with [facility] [place]. Significant findings include fibromyalgia , chronic back pain w/ history of work injury,hemorrhoids, chronic fatigue,abdominal pain,irregular bowel habits,depression,Hx of depression,anxiety,arthritis,headache disorder      URINARY HABITS  Types of symptoms: stress incontinence, urge incontinence, incomplete emptying, and nocturia  Events associated with the onset of urinary complaints: extenral stressors, emotional/phsychological state  Abdominal/Vaginal Pressure complaints: occassional/intermittent  Urinary Frequency: will fill out bladder diary. Leaking occurs: cough or sneez  Amount of leakage: min  Pad use: No  Pad Change frequency: N/A  Bladder irritants: Cola/ sodas   Post void dribble: Yes  Hovering: Yes  Empty bladder just in case: yes  Do you ever leak urine without knowing it?  No     BOWEL HABITS  Types of symptoms: Constipation and other diarrhea     Frequency of bowel movements: every other day   Stool consistency: [place] Stool Scale: 6 & 7   Do you strain with defecation: Yes when   Laxative use: Yes (Truelance)    [place] Scale: N/A  History of Sexual Abuse:No  Sexual [place] Status: Active   Pain with initial and/or deep penetration: yes       [address] presents to physical therapy evaluation with primary c/o constipation or diarrhea and urinary incontinence. The results of the objective tests and measures show decreased lumbar spine mobility, and presence of pelvic obliquity,  core stability and hip muscles flexbility. Pelvic floor external and internal assessment will be done in subsequent visits. Signs and symptoms are consistent with diagnosis of Pelvic floor dysfunction. Pt and PT discussed evaluation findings, pathology, POC and HEP. Pt voiced understanding and performs HEP correctly without reported pain. Skilled Pelvic Physical Therapy is medically necessary to address the above impairments and reach functional goals. OBJECTIVE:   Posture: Decreased postural awareness and control seated in slouched position but able to correct with cues  Pelvic Alignment:  (+) pelvic obliquity , uneven ASIS L lower than R    Gait: pt ambulates on level ground with normal mechanics. Range Of Motion  Lumbar AROM screen: 50% flexion, 25% extension, lat flex 25% B,rotation  50% B  LE AROM screen: grossly WNL. Strength (MMT) will assess LE next session  Transverse Abdominis: 3-/5    Flexibility Summary: WNL BOUBACAR LE except     Special Tests  (+) Supine to long sitting , L ant rotation , R post rotation of ilium    Informed consent for internal pelvic evaluation given: Yes will be done in subsequent visits  Today's Treatment and Response:   Patient education was provided on objective findings of external and internal evaluation and expectations with treatment outcomes.  Educated on bladder normatives, adequate hydration levels, proper toileting posture, instructed in bladder, bowel, and diet diary log and issued handout , stress/urge urinary incontinence strategies, diaphragmatic breathing for PNS activation and pelvic floor relaxation , coordination of diaphragmatic breathing and pelvic floor contraction , and knack/pelvic muscle brace    Patient was instructed in and issued a HEP for: diet/bladder/bowel diary ,diaphragmatic breathing,dietary modification,fiber/fluid intake, toilet positioning, stress management and stretching exercises on lumbopelvic & hip. Charges: PT Jennial Moderate Complexity,       Total Timed Treatment: 10 min     Total Treatment Time: 45 min     Based on clinical rationale and outcome measures, this evaluation involved Moderate Complexity decision making due to 1-2 personal factors/comorbidities, 3 body structures involved/activity limitations, and evolving symptoms including stress urinary incontinence, urge urinary incontinence, and constipation/diarhhea  PLAN OF CARE:    Goals: (to be met in 10 visits)  1. The patient  to be educated on pelvic health , role of PT , therapeutic managements & goals as well as strategies for to manage symptoms at home, discussed bladder/bowel diary and home exercises program.  2.The patient to improve awareness of PFM and able to improve ability to contract and relax for full range with coordinated contraction of diaphragm and TrA  3. The patient to report improved urinary incontinence symptoms with good ability to pelvic brace and no leaking with functional activities. 4.The patient to report improved  bowel habits to daily with good bristol scale to 3 or 4. 5. The patient to improve overall with symptoms and function with PFDI-20 score improved to <100 decreasing PFM dysfunction  6. The patient to demonstrate good compliance with the program and able to progress with  core stabilization exercises without  leaking or difficulty .     Frequency / Duration: Patient will be seen for 1 x/week or a total of 10 visits over a 90 day period. Treatment will include: Manual Therapy, Neuromuscular Re-education, Therapeutic Activities, Therapeutic Exercise, Home Exercise Program instruction, and biofeedback      Education or treatment limitation: None  Rehab Potential:good      Patient/Family/Caregiver was advised of these findings, precautions, and treatment options and has agreed to actively participate in planning and for this course of care. Thank you for your referral. Please co-sign or sign and return this letter via fax as soon as possible to [phone]. If you have any questions, please contact me at Dept: [phone]    Sincerely,  Electronically signed by therapist: Peyman Mohan, PT  [de-identified] certification required: Yes  I certify the need for these services furnished under this plan of treatment and while under my care.     X___________________________________________________ Date____________________    Certification From: 0/71/9315  To:11/19/2023

## 2023-09-27 ENCOUNTER — OFFICE VISIT (OUTPATIENT)
Dept: PHYSICAL THERAPY | Age: 33
End: 2023-09-27
Attending: NURSE PRACTITIONER
Payer: COMMERCIAL

## 2023-09-27 DIAGNOSIS — K59.09 CHRONIC CONSTIPATION: Primary | ICD-10-CM

## 2023-09-27 PROCEDURE — 97110 THERAPEUTIC EXERCISES: CPT

## 2023-09-27 PROCEDURE — 97140 MANUAL THERAPY 1/> REGIONS: CPT

## 2023-09-27 NOTE — PROGRESS NOTES
Diagnosis:   Chronic constipation  Pelvic floor dysfunction in female       Referring Provider: Sue Stephens  Date of Evaluation:    8/21/2023    Precautions:  None Next MD visit:   none scheduled  Date of Surgery: n/a   Insurance Primary/Secondary: Adeline Zuniga / N/A     # Auth Visits: N/A            Subjective: Pt reports having higher intensity  back pain today  6/10, stating she has increased emotional stress today one after another. She also mentions that she has not taken benefiber, just trulance and  yesterday she had 3xBM and thats better than before ,she used to do a lot more. Objective: Mod TPP on thoracolumbar spine centrally and paraspinals    Assessment:The patient came in today with so much emotional stress that she started to have more back pain towards the end of her work schedule. Initiated the session with static cupping and encouraged to do box breathing on prone position. After IASTM she was positioned in supine with moist heat on thoracolumbar for more hip stretches that her pain is a lot less and tolerated standing exercises focusing on spinal mobility and hip mobility in functional position. The pt  will continue to monitor just trulance and will discuss further on dietary changes next visit. Goals:  (to be met in 10 visits)  1. The patient  to be educated on pelvic health , role of PT , therapeutic managements & goals as well as strategies for to manage symptoms at home, discussed bladder/bowel diary and home exercises program.  2.The patient to improve awareness of PFM and able to improve ability to contract and relax for full range with coordinated contraction of diaphragm and TrA  3. The patient to report improved urinary incontinence symptoms with good ability to pelvic brace and no leaking with functional activities. 4.The patient to report improved  bowel habits to daily with good bristol scale to 3 or 4. 5. The patient to improve overall with symptoms and function with PFDI-20 score improved to <100 decreasing PFM dysfunction  6. The patient to demonstrate good compliance with the program and able to progress with  core stabilization exercises without  leaking or difficulty . Plan: Continue with plan of care to address lumbopelvic & hip limitation of movement and PFM downtraining. Date: 8/30/2023  TX#: 2/10 Date: 9/11/2023           TX#: 3/10 Date: 9/20/203              TX#: 4/10 Date:9/27/2023              TX#: 5/10 Date: Tx#: 6/   Thera Act 12'  Review of bladder/bowel diary  Disscussed modification/increasing fluid and fiber supplement in addition to her meds. Added HEP for lumbopelvic flexibility. Thera Act 10'  Disscussed effects of adding benefiber to her intake and modifying it to 2x a day with trulance every other day. Supine Box breathing/relaxation with hip add stretching Thera Act 18'  Discussion on modification on fiber supplement. Supine Box breathing/relaxation with hip add stretching  Prone 360 diaphragmatic breathing     Manual Tx 20'  Abdominal mobilization/colon massage  Static cupping tech to thoracolumbar Manual Tx 23'  Hip add MET , STM/MFR/muscle mobs , alt michele hip abd/add  Sacral PA mobs  Static cupping tech to thoracolumbar Manual Tx 25'  Thoracic PA mobs  Trigger point/MFR  Lumbosacral PA mobs  Sliding cup on thoracolumbar paraspinals Manual Tx 25'  Static cupping to thoracolumbar paraspinals followed by  IASTM/STM   Trigger point/MFR  Thoracic CPA mobs          Thera ex:12'  Passive to active HS & piriformis stretch  Open book R/L  LTR stretch R/L  Hip ER/IR stretches   Thera ex:20'  Female chain reaction against the wall calf stretch with hip matrices R/L  Passive hip add & piriformis stretch  Prone passive hip ER/IR stretches to Active motion  Thera ex:20'  360 diaphragmatic breathing.   After manual Tx , passive to active hip stretches  Female chain reaction against the wall calf stretch with hip matrices R/L  SB wall roll up thoracic Ext stretches, rotation SB wall squat w/ shoulder level #2 bar  3 x 5           HEP: Diaphragmatic breathing/box breathing, Happy baby stretch,adductor stretch    Charges: TheraAct x 1,ManualTx x 2       Total Timed Treatment: 45min  Total Treatment Time: 45 min      Diagnosis:   Chronic constipation  Pelvic floor dysfunction in female      Referring Provider: Sue Stephens  Date of Evaluation:    8/21/2023    Precautions:  None Next MD visit:   none scheduled  Date of Surgery: Elizabeth Mariee is a 34year old female  who presents to therapy today with complaints of chronic diarrhea or constipation also have urge and stress incontinence symptoms. Pt states she had history of diarrhea for months , then followed by constipation started last year. She has tried linzess but was not doing well and have tried taking metamucil but would cause stomach pain, so currently taking  truelance which seem helping her. Current symptoms include: back pain, abdominal pain, rectal pain, and constipation, urinary incomplete emptying,     Pt describes pain level: current 0/10, best 0/10, worst 8/10. LBP   Quality: dull and aching  back or abdominal pain     Pregnant Now: No  Obstetrical/Gynecological history: Last menstrual period: Started 8/17/2023  Manometry: Testing was done. Occupation/Activities: work from home , infront of computer   PFDI-20: 144.79 /300     Pt goals include : To improve her symptoms to be able to function better    [place] [facility] [place]  Significant findings include fibromyalgia , chronic back pain w/ history of work injury,hemorrhoids, chronic fatigue,abdominal pain,irregular bowel habits,depression,Hx of depression,anxiety,arthritis,headache disorder      URINARY HABITS  Types of symptoms: stress incontinence, urge incontinence, incomplete emptying, and nocturia  Events associated with the onset of urinary complaints: extenral stressors, emotional/phsychological state  Abdominal/Vaginal Pressure complaints: occassional/intermittent  Urinary Frequency: will fill out bladder diary. Leaking occurs: cough or sneez  Amount of leakage: min  Pad use: No  Pad Change frequency: N/A  Bladder irritants: Cola/ sodas   Post void dribble: Yes  Hovering: Yes  Empty bladder just in case: yes  Do you ever leak urine without knowing it? No     BOWEL HABITS  Types of symptoms: Constipation and other diarrhea     Frequency of bowel movements: every other day   Stool consistency: [place] Stool Scale: 6 & 7   Do you strain with defecation: Yes when   Laxative use: Yes (Truelance)    SEXUAL HEALTH STATUS  [place] Scale: N/A  History of Sexual Abuse:No  Sexual [place] Status: Active   Pain with initial and/or deep penetration: yes      [address] presents to physical therapy evaluation with primary c/o constipation or diarrhea and urinary incontinence. The results of the objective tests and measures show decreased lumbar spine mobility, and presence of pelvic obliquity,  core stability and hip muscles flexbility. Pelvic floor external and internal assessment will be done in subsequent visits. Signs and symptoms are consistent with diagnosis of Pelvic floor dysfunction. Pt and PT discussed evaluation findings, pathology, POC and HEP. Pt voiced understanding and performs HEP correctly without reported pain. Skilled Pelvic Physical Therapy is medically necessary to address the above impairments and reach functional goals. OBJECTIVE:   Posture: Decreased postural awareness and control seated in slouched position but able to correct with cues  Pelvic Alignment:  (+) pelvic obliquity , uneven ASIS L lower than R    Gait: pt ambulates on level ground with normal mechanics. Range Of Motion  Lumbar AROM screen: 50% flexion, 25% extension, lat flex 25% B,rotation  50% B  LE AROM screen: grossly WNL.     Strength (MMT) will assess LE next session  Transverse Abdominis: 3-/5    Flexibility Summary: WNL BOUBACAR LE except     Special Tests  (+) Supine to long sitting , L ant rotation , R post rotation of ilium    Informed consent for internal pelvic evaluation given: Yes will be done in subsequent visits  Today's Treatment and Response:   Patient education was provided on objective findings of external and internal evaluation and expectations with treatment outcomes. Educated on bladder normatives, adequate hydration levels, proper toileting posture, instructed in bladder, bowel, and diet diary log and issued handout , stress/urge urinary incontinence strategies, diaphragmatic breathing for PNS activation and pelvic floor relaxation , coordination of diaphragmatic breathing and pelvic floor contraction , and knack/pelvic muscle brace    Patient was instructed in and issued a HEP for: diet/bladder/bowel diary ,diaphragmatic breathing,dietary modification,fiber/fluid intake, toilet positioning, stress management and stretching exercises on lumbopelvic & hip. Charges: PT Eval Moderate Complexity,       Total Timed Treatment: 10 min     Total Treatment Time: 45 min     Based on clinical rationale and outcome measures, this evaluation involved Moderate Complexity decision making due to 1-2 personal factors/comorbidities, 3 body structures involved/activity limitations, and evolving symptoms including stress urinary incontinence, urge urinary incontinence, and constipation/diarhhea  PLAN OF CARE:    Goals: (to be met in 10 visits)  1. The patient  to be educated on pelvic health , role of PT , therapeutic managements & goals as well as strategies for to manage symptoms at home, discussed bladder/bowel diary and home exercises program.  2.The patient to improve awareness of PFM and able to improve ability to contract and relax for full range with coordinated contraction of diaphragm and TrA  3. The patient to report improved urinary incontinence symptoms with good ability to pelvic brace and no leaking with functional activities. 4.The patient to report improved  bowel habits to daily with good bristol scale to 3 or 4. 5. The patient to improve overall with symptoms and function with PFDI-20 score improved to <100 decreasing PFM dysfunction  6. The patient to demonstrate good compliance with the program and able to progress with  core stabilization exercises without  leaking or difficulty . Frequency / Duration: Patient will be seen for 1 x/week or a total of 10 visits over a 90 day period. Treatment will include: Manual Therapy, Neuromuscular Re-education, Therapeutic Activities, Therapeutic Exercise, Home Exercise Program instruction, and biofeedback      Education or treatment limitation: None  Rehab Potential:good      Patient/Family/Caregiver was advised of these findings, precautions, and treatment options and has agreed to actively participate in planning and for this course of care. Thank you for your referral. Please co-sign or sign and return this letter via fax as soon as possible to [phone]. If you have any questions, please contact me at Dept: [phone]    Sincerely,  Electronically signed by therapist: Stella Veloz, PT  [de-identified] certification required: Yes  I certify the need for these services furnished under this plan of treatment and while under my care.     X___________________________________________________ Date____________________    Certification From: 3/24/0644  To:11/19/2023

## 2023-10-04 ENCOUNTER — OFFICE VISIT (OUTPATIENT)
Dept: PHYSICAL THERAPY | Age: 33
End: 2023-10-04
Attending: NURSE PRACTITIONER
Payer: COMMERCIAL

## 2023-10-04 DIAGNOSIS — M62.89 PELVIC FLOOR DYSFUNCTION IN FEMALE: Primary | ICD-10-CM

## 2023-10-04 PROCEDURE — 97140 MANUAL THERAPY 1/> REGIONS: CPT

## 2023-10-04 PROCEDURE — 97110 THERAPEUTIC EXERCISES: CPT

## 2023-10-04 NOTE — PROGRESS NOTES
Diagnosis:   Chronic constipation  Pelvic floor dysfunction in female       Referring Provider: Bette Mcintosh  Date of Evaluation:    8/21/2023    Precautions:  None Next MD visit:   none scheduled  Date of Surgery: n/a   Insurance Primary/Secondary: [employer] / N/A     # Auth Visits: N/A            Subjective: Pt reports feeling lesser back pain today, she still  goes about 6x a day with trulance. Pt denies any increased stress level lately   Pain: 2-3/10 LBP prior to tx  Objective:   External and Internal  PFM assessment  Informed consent for internal pelvic evaluation given: Yes    External Observation:   Voluntary contraction: present   Voluntary relaxation: present but decreased mobility    Involuntary contraction: present  Involuntary relaxation: present but decreased    Mons pubis: WNL  Labia majora: WNL  Labia minora: WNL  Urethral meatus: WNL  Introitus: other: decreased/tighter  Perineal body: WNL    Sensory/Reflex:  Vestibule: Not Tested  Anal Mckinney: Not Tested    Pelvic Floor Muscle strength: (PERF= Power/Endurance/Reps/Fast) MMT: 2/5   /  4 sec   External Anal Sphincter: NT  Accessory Muscle Use: adductors      Assessment:  The patient gave verbal consent for internal/vaginal exam.Noted  small nodule on on 2nd to 3rd PFM layer , L diameter is bigger than R ,Pt was having   Mild pain/discomfort during the internal exam.      Goals:  (to be met in 10 visits)  1. The patient  to be educated on pelvic health , role of PT , therapeutic managements & goals as well as strategies for to manage symptoms at home, discussed bladder/bowel diary and home exercises program.  2.The patient to improve awareness of PFM and able to improve ability to contract and relax for full range with coordinated contraction of diaphragm and TrA  3. The patient to report improved urinary incontinence symptoms with good ability to pelvic brace and no leaking with functional activities.   4.The patient to report improved  bowel habits to daily with good bristol scale to 3 or 4. 5. The patient to improve overall with symptoms and function with PFDI-20 score improved to <100 decreasing PFM dysfunction  6. The patient to demonstrate good compliance with the program and able to progress with  core stabilization exercises without  leaking or difficulty . Plan: Continue with plan of care to address lumbopelvic & hip limitation of movement and PFM downtraining. Date: 8/30/2023  TX#: 2/10 Date: 9/11/2023           TX#: 3/10 Date: 9/20/203              TX#: 4/10 Date:9/27/2023              TX#: 5/10 Date:10/04/2023  Tx#: 6/10   Thera Act 16'  Review of bladder/bowel diary  Disscussed modification/increasing fluid and fiber supplement in addition to her meds. Added HEP for lumbopelvic flexibility. Thera Act 10'  Disscussed effects of adding benefiber to her intake and modifying it to 2x a day with trulance every other day. Supine Box breathing/relaxation with hip add stretching Thera Act 18'  Discussion on modification on fiber supplement.   Supine Box breathing/relaxation with hip add stretching  Prone 360 diaphragmatic breathing  Thera Act 25'  PFM External/ internal assessment  Supine Box breathing/relaxation with hip add stretching  Sidelying and prone 360 diaphragmatic breathing   Manual Tx 20'  Abdominal mobilization/colon massage  Static cupping tech to thoracolumbar Manual Tx 23'  Hip add MET , STM/MFR/muscle mobs , alt michele hip abd/add  Sacral PA mobs  Static cupping tech to thoracolumbar Manual Tx 25'  Thoracic PA mobs  Trigger point/MFR  Lumbosacral PA mobs  Sliding cup on thoracolumbar paraspinals Manual Tx 25'  Static cupping to thoracolumbar paraspinals followed by  IASTM/STM   Trigger point/MFR  Thoracic CPA mobs       Manual Tx 20'  Static cupping to thoracolumbar paraspinals followed by  IASTM/STM   Trigger point/MFR  Thoracic CPA mobs   Thera ex:12'  Passive to active HS & piriformis stretch  Open book R/L  LTR stretch R/L  Hip ER/IR stretches   Thera ex:20'  Female chain reaction against the wall calf stretch with hip matrices R/L  Passive hip add & piriformis stretch  Prone passive hip ER/IR stretches to Active motion  Thera ex:20'  360 diaphragmatic breathing. After manual Tx , passive to active hip stretches  Female chain reaction against the wall calf stretch with hip matrices R/L  SB wall roll up thoracic Ext stretches, rotation   SB wall squat w/ shoulder level #2 bar  3 x 5           HEP: Diaphragmatic breathing/box breathing, Happy baby stretch,adductor stretch    Charges: TheraAct x 2 ,ManualTx x 1       Total Timed Treatment: 45min  Total Treatment Time: 45 min        Diagnosis:   Chronic constipation  Pelvic floor dysfunction in female      Referring Provider: Parminder Carrero  Date of Evaluation:    8/21/2023    Precautions:  None Next MD visit:   none scheduled  Date of Surgery: Germán Canas is a 34year old female  who presents to therapy today with complaints of chronic diarrhea or constipation also have urge and stress incontinence symptoms. Pt states she had history of diarrhea for months , then followed by constipation started last year. She has tried linzess but was not doing well and have tried taking metamucil but would cause stomach pain, so currently taking  truelance which seem helping her. Current symptoms include: back pain, abdominal pain, rectal pain, and constipation, urinary incomplete emptying,     Pt describes pain level: current 0/10, best 0/10, worst 8/10. LBP   Quality: dull and aching  back or abdominal pain     Pregnant Now: No  Obstetrical/Gynecological history: Last menstrual period: Started 8/17/2023  Manometry: Testing was done. Occupation/Activities: work from home , infront of computer   PFDI-20: 144.79 /300     Pt goals include : To improve her symptoms to be able to function better    Past medical history was reviewed with [facility] [place].  Significant findings include fibromyalgia , chronic back pain w/ history of work injury,hemorrhoids, chronic fatigue,abdominal pain,irregular bowel habits,depression,Hx of depression,anxiety,arthritis,headache disorder      URINARY HABITS  Types of symptoms: stress incontinence, urge incontinence, incomplete emptying, and nocturia  Events associated with the onset of urinary complaints: extenral stressors, emotional/phsychological state  Abdominal/Vaginal Pressure complaints: occassional/intermittent  Urinary Frequency: will fill out bladder diary. Leaking occurs: cough or sneez  Amount of leakage: min  Pad use: No  Pad Change frequency: N/A  Bladder irritants: Cola/ sodas   Post void dribble: Yes  Hovering: Yes  Empty bladder just in case: yes  Do you ever leak urine without knowing it? No     BOWEL HABITS  Types of symptoms: Constipation and other diarrhea     Frequency of bowel movements: every other day   Stool consistency: [place] Stool Scale: 6 & 7   Do you strain with defecation: Yes when   Laxative use: Yes (Truelance)    SEXUAL HEALTH STATUS  Marinoff Scale: N/A  History of Sexual Abuse:No  Sexual [place] Status: Active   Pain with initial and/or deep penetration: yes      [address] presents to physical therapy evaluation with primary c/o constipation or diarrhea and urinary incontinence. The results of the objective tests and measures show decreased lumbar spine mobility, and presence of pelvic obliquity,  core stability and hip muscles flexbility. Pelvic floor external and internal assessment will be done in subsequent visits. Signs and symptoms are consistent with diagnosis of Pelvic floor dysfunction. Pt and PT discussed evaluation findings, pathology, POC and HEP. Pt voiced understanding and performs HEP correctly without reported pain. Skilled Pelvic Physical Therapy is medically necessary to address the above impairments and reach functional goals.     OBJECTIVE:   Posture: Decreased postural awareness and control seated in slouched position but able to correct with cues  Pelvic Alignment:  (+) pelvic obliquity , uneven ASIS L lower than R    Gait: pt ambulates on level ground with normal mechanics. Range Of Motion  Lumbar AROM screen: 50% flexion, 25% extension, lat flex 25% B,rotation  50% B  LE AROM screen: grossly WNL. Strength (MMT) will assess LE next session  Transverse Abdominis: 3-/5    Flexibility Summary: WNL BOUBACAR LE except     Special Tests  (+) Supine to long sitting , L ant rotation , R post rotation of ilium    Informed consent for internal pelvic evaluation given: Yes will be done in subsequent visits  Today's Treatment and Response:   Patient education was provided on objective findings of external and internal evaluation and expectations with treatment outcomes. Educated on bladder normatives, adequate hydration levels, proper toileting posture, instructed in bladder, bowel, and diet diary log and issued handout , stress/urge urinary incontinence strategies, diaphragmatic breathing for PNS activation and pelvic floor relaxation , coordination of diaphragmatic breathing and pelvic floor contraction , and knack/pelvic muscle brace    Patient was instructed in and issued a HEP for: diet/bladder/bowel diary ,diaphragmatic breathing,dietary modification,fiber/fluid intake, toilet positioning, stress management and stretching exercises on lumbopelvic & hip. Charges: PT Eval Moderate Complexity,       Total Timed Treatment: 10 min     Total Treatment Time: 45 min     Based on clinical rationale and outcome measures, this evaluation involved Moderate Complexity decision making due to 1-2 personal factors/comorbidities, 3 body structures involved/activity limitations, and evolving symptoms including stress urinary incontinence, urge urinary incontinence, and constipation/diarhhea  PLAN OF CARE:    Goals: (to be met in 10 visits)  1. The patient  to be educated on pelvic health , role of PT , therapeutic managements & goals as well as strategies for to manage symptoms at home, discussed bladder/bowel diary and home exercises program.  2.The patient to improve awareness of PFM and able to improve ability to contract and relax for full range with coordinated contraction of diaphragm and TrA  3. The patient to report improved urinary incontinence symptoms with good ability to pelvic brace and no leaking with functional activities. 4.The patient to report improved  bowel habits to daily with good bristol scale to 3 or 4. 5. The patient to improve overall with symptoms and function with PFDI-20 score improved to <100 decreasing PFM dysfunction  6. The patient to demonstrate good compliance with the program and able to progress with  core stabilization exercises without  leaking or difficulty . Frequency / Duration: Patient will be seen for 1 x/week or a total of 10 visits over a 90 day period. Treatment will include: Manual Therapy, Neuromuscular Re-education, Therapeutic Activities, Therapeutic Exercise, Home Exercise Program instruction, and biofeedback      Education or treatment limitation: None  Rehab Potential:good      Patient/Family/Caregiver was advised of these findings, precautions, and treatment options and has agreed to actively participate in planning and for this course of care. Thank you for your referral. Please co-sign or sign and return this letter via fax as soon as possible to [phone]. If you have any questions, please contact me at Dept: [phone]    Sincerely,  Electronically signed by therapist: Glory Fernandez PT  [de-identified] certification required: Yes  I certify the need for these services furnished under this plan of treatment and while under my care.     X___________________________________________________ Date____________________    Certification From: 2/04/2741  To:11/19/2023

## 2023-10-11 ENCOUNTER — OFFICE VISIT (OUTPATIENT)
Dept: PHYSICAL THERAPY | Age: 33
End: 2023-10-11
Attending: NURSE PRACTITIONER
Payer: COMMERCIAL

## 2023-10-11 DIAGNOSIS — M62.89 PELVIC FLOOR DYSFUNCTION IN FEMALE: Primary | ICD-10-CM

## 2023-10-11 PROCEDURE — 97140 MANUAL THERAPY 1/> REGIONS: CPT

## 2023-10-11 PROCEDURE — 97110 THERAPEUTIC EXERCISES: CPT

## 2023-10-12 NOTE — PROGRESS NOTES
Diagnosis:   Chronic constipation  Pelvic floor dysfunction in female       Referring Provider: Gurvinder Centeno  Date of Evaluation:    8/21/2023    Precautions:  None Next MD visit:   none scheduled  Date of Surgery: n/a   Insurance Primary/Secondary: [facility] / N/A     # Auth Visits: N/A            Subjective: Pt reports better back pain lately  Pain: 3/10 LBP prior to tx  Objective:   E    Assessment: Pt continues to have decreased hip adductor flexibility but noticeably improved compared to initial visit. Pt was encouraged to continue doing her daily stretches on lumbar and hip muscles, relaxation breathing and stress management. We will progress to uptraining PFM as warranted. Goals:  (to be met in 10 visits)  1. The patient  to be educated on pelvic health , role of PT , therapeutic managements & goals as well as strategies for to manage symptoms at home, discussed bladder/bowel diary and home exercises program.  2.The patient to improve awareness of PFM and able to improve ability to contract and relax for full range with coordinated contraction of diaphragm and TrA  3. The patient to report improved urinary incontinence symptoms with good ability to pelvic brace and no leaking with functional activities. 4.The patient to report improved  bowel habits to daily with good bristol scale to 3 or 4. 5. The patient to improve overall with symptoms and function with PFDI-20 score improved to <100 decreasing PFM dysfunction  6. The patient to demonstrate good compliance with the program and able to progress with  core stabilization exercises without  leaking or difficulty . Plan: Continue with plan of care to address lumbopelvic & hip limitation of movement and PFM downtraining.   Date: 8/30/2023  TX#: 2/10 Date: 9/11/2023           TX#: 3/10 Date: 9/20/203              TX#: 4/10 Date:9/27/2023              TX#: 5/10 Date:10/04/2023  Tx#: 6/10 Date:10/11/2023  Tx#: 7/10   Thera Act 16'  Review of bladder/bowel diary  Disscussed modification/increasing fluid and fiber supplement in addition to her meds. Added HEP for lumbopelvic flexibility. Thera Act 10'  Disscussed effects of adding benefiber to her intake and modifying it to 2x a day with trulance every other day. Supine Box breathing/relaxation with hip add stretching Thera Act 18'  Discussion on modification on fiber supplement. Supine Box breathing/relaxation with hip add stretching  Prone 360 diaphragmatic breathing  Thera Act 25'  PFM External/ internal assessment  Supine Box breathing/relaxation with hip add stretching  Sidelying and prone 360 diaphragmatic breathing Thera Ex:15'  Diaphragmatic breathing with strething  Cat and camel stretch with hip in IR and ER  Quadroped rocking with hip matrices x 5 each plane of movement  LTR and open book stretch   Manual Tx 20'  Abdominal mobilization/colon massage  Static cupping tech to thoracolumbar Manual Tx 23'  Hip add MET , STM/MFR/muscle mobs , alt michele hip abd/add  Sacral PA mobs  Static cupping tech to thoracolumbar Manual Tx 25'  Thoracic PA mobs  Trigger point/MFR  Lumbosacral PA mobs  Sliding cup on thoracolumbar paraspinals Manual Tx 25'  Static cupping to thoracolumbar paraspinals followed by  IASTM/STM   Trigger point/MFR  Thoracic CPA mobs       Manual Tx 20'  Static cupping to thoracolumbar paraspinals followed by  IASTM/STM   Trigger point/MFR  Thoracic CPA mobs Manual Tx 30'  Static cupping to thoracolumbar paraspinals followed by  IASTM/STM   Trigger point/MFR  Thoracic CPA mobs  Contract relax on hip add muscles   Thera ex:12'  Passive to active HS & piriformis stretch  Open book R/L  LTR stretch R/L  Hip ER/IR stretches   Thera ex:20'  Female chain reaction against the wall calf stretch with hip matrices R/L  Passive hip add & piriformis stretch  Prone passive hip ER/IR stretches to Active motion  Thera ex:20'  360 diaphragmatic breathing.   After manual Tx , passive to active hip stretches  Female chain reaction against the wall calf stretch with hip matrices R/L  SB wall roll up thoracic Ext stretches, rotation   SB wall squat w/ shoulder level #2 bar  3 x 5             HEP: Diaphragmatic breathing/box breathing, Happy baby stretch,adductor stretch    Charges: Thera Ex x 1 ManualTx x 2       Total Timed Treatment: 45min  Total Treatment Time: 45 min        Diagnosis:   Chronic constipation  Pelvic floor dysfunction in female      Referring Provider: Bakari Diaz  Date of Evaluation:    8/21/2023    Precautions:  None Next MD visit:   none scheduled  Date of Surgery: Miguel Benson is a 34year old female  who presents to therapy today with complaints of chronic diarrhea or constipation also have urge and stress incontinence symptoms. Pt states she had history of diarrhea for months , then followed by constipation started last year. She has tried linzess but was not doing well and have tried taking metamucil but would cause stomach pain, so currently taking  truelance which seem helping her. Current symptoms include: back pain, abdominal pain, rectal pain, and constipation, urinary incomplete emptying,     Pt describes pain level: current 0/10, best 0/10, worst 8/10. LBP   Quality: dull and aching  back or abdominal pain     Pregnant Now: No  Obstetrical/Gynecological history: Last menstrual period: Started 8/17/2023  Manometry: Testing was done. Occupation/Activities: work from home , infront of computer   PFDI-20: 144.79 /300     Pt goals include : To improve her symptoms to be able to function better    Past medical history was [place] with [facility].  Significant findings include fibromyalgia , chronic back pain w/ history of work injury,hemorrhoids, chronic fatigue,abdominal pain,irregular bowel habits,depression,Hx of depression,anxiety,arthritis,headache disorder      URINARY HABITS  Types of symptoms: stress incontinence, urge incontinence, incomplete emptying, and nocturia  Events associated with the onset of urinary complaints: extenral stressors, emotional/phsychological state  Abdominal/Vaginal Pressure complaints: occassional/intermittent  Urinary Frequency: will fill out bladder diary. Leaking occurs: cough or sneez  Amount of leakage: min  Pad use: No  Pad Change frequency: N/A  Bladder irritants: Cola/ sodas   Post void dribble: Yes  Hovering: Yes  Empty bladder just in case: yes  Do you ever leak urine without knowing it? No     BOWEL HABITS  Types of symptoms: Constipation and other diarrhea     Frequency of bowel movements: every other day   Stool consistency: [place] Stool Scale: 6 & 7   Do you strain with defecation: Yes when   Laxative use: Yes (Truelance)    SEXUAL HEALTH STATUS  Marinoff Scale: N/A  History of Sexual Abuse:No  Sexual [place] Status: Active   Pain with initial and/or deep penetration: yes      [address] presents to physical therapy evaluation with primary c/o constipation or diarrhea and urinary incontinence. The results of the objective tests and measures show decreased lumbar spine mobility, and presence of pelvic obliquity,  core stability and hip muscles flexbility. Pelvic floor external and internal assessment will be done in subsequent visits. Signs and symptoms are consistent with diagnosis of Pelvic floor dysfunction. Pt and PT discussed evaluation findings, pathology, POC and HEP. Pt voiced understanding and performs HEP correctly without reported pain. Skilled Pelvic Physical Therapy is medically necessary to address the above impairments and reach functional goals. OBJECTIVE:   Posture: Decreased postural awareness and control seated in slouched position but able to correct with cues  Pelvic Alignment:  (+) pelvic obliquity , uneven ASIS L lower than R    Gait: pt ambulates on level ground with normal mechanics.      Range Of Motion  Lumbar AROM screen: 50% flexion, 25% extension, lat flex 25% B,rotation  50% B  LE AROM screen: grossly WNL. Strength (MMT) will assess LE next session  Transverse Abdominis: 3-/5    Flexibility Summary: WNL BOUBACAR LE except     Special Tests  (+) Supine to long sitting , L ant rotation , R post rotation of ilium    Informed consent for internal pelvic evaluation given: Yes will be done in subsequent visits  Today's Treatment and Response:   Patient education was provided on objective findings of external and internal evaluation and expectations with treatment outcomes. Educated on bladder normatives, adequate hydration levels, proper toileting posture, instructed in bladder, bowel, and diet diary log and issued handout , stress/urge urinary incontinence strategies, diaphragmatic breathing for PNS activation and pelvic floor relaxation , coordination of diaphragmatic breathing and pelvic floor contraction , and knack/pelvic muscle brace    Patient was instructed in and issued a HEP for: diet/bladder/bowel diary ,diaphragmatic breathing,dietary modification,fiber/fluid intake, toilet positioning, stress management and stretching exercises on lumbopelvic & hip. Charges: PT Eval Moderate Complexity,       Total Timed Treatment: 10 min     Total Treatment Time: 45 min     Based on clinical rationale and outcome measures, this evaluation involved Moderate Complexity decision making due to 1-2 personal factors/comorbidities, 3 body structures involved/activity limitations, and evolving symptoms including stress urinary incontinence, urge urinary incontinence, and constipation/diarhhea  PLAN OF CARE:    Goals: (to be met in 10 visits)  1. The patient  to be educated on pelvic health , role of PT , therapeutic managements & goals as well as strategies for to manage symptoms at home, discussed bladder/bowel diary and home exercises program.  2.The patient to improve awareness of PFM and able to improve ability to contract and relax for full range with coordinated contraction of diaphragm and TrA  3.The patient to report improved urinary incontinence symptoms with good ability to pelvic brace and no leaking with functional activities. 4.The patient to report improved  bowel habits to daily with good bristol scale to 3 or 4. 5. The patient to improve overall with symptoms and function with PFDI-20 score improved to <100 decreasing PFM dysfunction  6. The patient to demonstrate good compliance with the program and able to progress with  core stabilization exercises without  leaking or difficulty . Frequency / Duration: Patient will be seen for 1 x/week or a total of 10 visits over a 90 day period. Treatment will include: Manual Therapy, Neuromuscular Re-education, Therapeutic Activities, Therapeutic Exercise, Home Exercise Program instruction, and biofeedback      Education or treatment limitation: None  Rehab Potential:good      Patient/Family/Caregiver was advised of these findings, precautions, and treatment options and has agreed to actively participate in planning and for this course of care. Thank you for your referral. Please co-sign or sign and return this letter via fax as soon as possible to [phone]. If you have any questions, please contact me at Dept: [phone]    Sincerely,  Electronically signed by therapist: Reynaldo Reid, PT  [de-identified] certification required: Yes  I certify the need for these services furnished under this plan of treatment and while under my care.     X___________________________________________________ Date____________________    Certification From: 0/60/2094  To:11/19/2023

## 2023-10-13 ENCOUNTER — TELEPHONE (OUTPATIENT)
Dept: ORTHOPEDICS CLINIC | Facility: CLINIC | Age: 33
End: 2023-10-13

## 2023-10-13 DIAGNOSIS — M54.9 BACK PAIN, UNSPECIFIED BACK LOCATION, UNSPECIFIED BACK PAIN LATERALITY, UNSPECIFIED CHRONICITY: Primary | ICD-10-CM

## 2023-10-13 NOTE — TELEPHONE ENCOUNTER
Future Appointments   Date Time Provider Department Center   10/27/2023 11:40 AM Severo Norris MD [facility]       This patient is coming for Entire spine is still in pain . There was recent imaging done last 11/2018. Please advise if additional views are needed for this appt. Thanks.      Patient may be reached at [phone]

## 2023-10-18 ENCOUNTER — OFFICE VISIT (OUTPATIENT)
Dept: PHYSICAL THERAPY | Age: 33
End: 2023-10-18
Attending: NURSE PRACTITIONER
Payer: COMMERCIAL

## 2023-10-18 DIAGNOSIS — M62.89 PELVIC FLOOR DYSFUNCTION IN FEMALE: Primary | ICD-10-CM

## 2023-10-18 PROCEDURE — 97140 MANUAL THERAPY 1/> REGIONS: CPT

## 2023-10-18 PROCEDURE — 97110 THERAPEUTIC EXERCISES: CPT

## 2023-10-18 NOTE — PROGRESS NOTES
Diagnosis:   Chronic constipation  Pelvic floor dysfunction in female       Referring Provider: Bakari Diaz  Date of Evaluation:    8/21/2023    Precautions:  None Next MD visit:   none scheduled  Date of Surgery: n/a   Insurance Primary/Secondary: Marcia Carlson / N/A     # Auth Visits: N/A            Subjective: Pt reports she had high intensity back pain sunday 7-8/10 from standing for extended period , she had to take medication for pain from sunday to [facility] ,  she also mentions  than monday she had bad day on bowel movement that she went more than 6x and was already having some bleeding and  \"raw\" when she wipes. Pain: 6/10 LBP prior to tx  Objective:   (+) pelvic obliquity R ASIS lower than L   Decreased and tender to thoracolumbar unilateral PA mobs  Hamstring length: L 35 deg , R 45 deg      Assessment: The patient received pelvic adjustment and hip mobilization and stretches to increase hip movement specially in adduction. She continues to have tight hip adductor and hamstring but she reports compliance with daily stretches. Pt is scheduled to see a ortho spine specialist next week and in few weeks will follow up with her GI doctor. Goals:  (to be met in 10 visits)  1. The patient  to be educated on pelvic health , role of PT , therapeutic managements & goals as well as strategies for to manage symptoms at home, discussed bladder/bowel diary and home exercises program.  2.The patient to improve awareness of PFM and able to improve ability to contract and relax for full range with coordinated contraction of diaphragm and TrA  3. The patient to report improved urinary incontinence symptoms with good ability to pelvic brace and no leaking with functional activities. 4.The patient to report improved  bowel habits to daily with good bristol scale to 3 or 4. 5. The patient to improve overall with symptoms and function with PFDI-20 score improved to <100 decreasing PFM dysfunction  6. The patient to demonstrate good compliance with the program and able to progress with  core stabilization exercises without  leaking or difficulty . Plan: Continue with plan of care to address lumbopelvic & hip limitation of movement and PFM downtraining. Date: 8/30/2023  TX#: 2/10 Date: 9/11/2023           TX#: 3/10 Date: 9/20/203              TX#: 4/10 Date:9/27/2023              TX#: 5/10 Date:10/04/2023  Tx#: 6/10 Date:10/11/2023  Tx#: 7/10 Date:10/18/2023  Tx#: 8/10   Thera Act 16'  Review of bladder/bowel diary  Disscussed modification/increasing fluid and fiber supplement in addition to her meds. Added HEP for lumbopelvic flexibility. Thera Act 10'  Disscussed effects of adding benefiber to her intake and modifying it to 2x a day with trulance every other day. Supine Box breathing/relaxation with hip add stretching Thera Act 18'  Discussion on modification on fiber supplement.   Supine Box breathing/relaxation with hip add stretching  Prone 360 diaphragmatic breathing  Thera Act 25'  PFM External/ internal assessment  Supine Box breathing/relaxation with hip add stretching  Sidelying and prone 360 diaphragmatic breathing Thera Ex:15'  Diaphragmatic breathing with strething  Cat and camel stretch with hip in IR and ER  Quadroped rocking with hip matrices x 5 each plane of movement  LTR and open book stretch Thera Ex: 15'  Nustep L 5 x 5'  SB roll up BUE and thoracic stretch OH and diagonal stretches  Passive Hamstring and piriformis stretch BLE    Manual Tx 20'  Abdominal mobilization/colon massage  Static cupping tech to thoracolumbar Manual Tx 23'  Hip add MET , STM/MFR/muscle mobs , alt michele hip abd/add  Sacral PA mobs  Static cupping tech to thoracolumbar Manual Tx 25'  Thoracic PA mobs  Trigger point/MFR  Lumbosacral PA mobs  Sliding cup on thoracolumbar paraspinals Manual Tx 25'  Static cupping to thoracolumbar paraspinals followed by  IASTM/STM   Trigger point/MFR  Thoracic CPA mobs       Manual Tx 20'  Static cupping to thoracolumbar paraspinals followed by  IASTM/STM   Trigger point/MFR  Thoracic CPA mobs Manual Tx 30'  Static cupping to thoracolumbar paraspinals followed by  IASTM/STM   Trigger point/MFR  Thoracic CPA mobs  Contract relax on hip add muscles Manual Tx 30'  Static cupping to thoracolumbar paraspinals followed by  C/UPA mobs to thoracolumbar   Hip ER PA mobs BLE  Hip lateral glides with belt BLE   Thera ex:12'  Passive to active HS & piriformis stretch  Open book R/L  LTR stretch R/L  Hip ER/IR stretches   Thera ex:20'  Female chain reaction against the wall calf stretch with hip matrices R/L  Passive hip add & piriformis stretch  Prone passive hip ER/IR stretches to Active motion  Thera ex:20'  360 diaphragmatic breathing. After manual Tx , passive to active hip stretches  Female chain reaction against the wall calf stretch with hip matrices R/L  SB wall roll up thoracic Ext stretches, rotation   SB wall squat w/ shoulder level #2 bar  3 x 5               HEP: Diaphragmatic breathing/box breathing, Happy baby stretch,adductor stretch    Charges: Thera Ex x 1 ManualTx x 2       Total Timed Treatment: 45min  Total Treatment Time: 45 min        Diagnosis:   Chronic constipation  Pelvic floor dysfunction in female      Referring Provider: Andres Alvarez  Date of Evaluation:    8/21/2023    Precautions:  None Next MD visit:   none scheduled  Date of Surgery: Shara Pizano is a 34year old female  who presents to therapy today with complaints of chronic diarrhea or constipation also have urge and stress incontinence symptoms. Pt states she had history of diarrhea for months , then followed by constipation started last year. She has tried linzess but was not doing well and have tried taking metamucil but would cause stomach pain, so currently taking  truelance which seem helping her.     Current symptoms include: back pain, abdominal pain, rectal pain, and constipation, urinary incomplete emptying,     Pt describes pain level: current 0/10, best 0/10, worst 8/10. LBP   Quality: dull and aching  back or abdominal pain     Pregnant Now: No  Obstetrical/Gynecological history: Last menstrual period: Started 2023  Manometry: Testing was done. Occupation/Activities: work from home , infront of computer   PFDI-20: 144.79 /300     Pt goals include : To improve her symptoms to be able to function better    [place] [facility] [place] findings include fibromyalgia , chronic back pain w/ history of work injury,hemorrhoids, chronic fatigue,abdominal pain,irregular bowel habits,depression,Hx of depression,anxiety,arthritis,headache disorder      URINARY HABITS  Types of symptoms: stress incontinence, urge incontinence, incomplete emptying, and nocturia  Events associated with the onset of urinary complaints: extenral stressors, emotional/phsychological state  Abdominal/Vaginal Pressure complaints: occassional/intermittent  Urinary Frequency: will fill out bladder diary. Leaking occurs: cough or sneez  Amount of leakage: min  Pad use: No  Pad Change frequency: N/A  Bladder irritants: Cola/ sodas   Post void dribble: Yes  Hovering: Yes  Empty bladder just in case: yes  Do you ever leak urine without knowing it? No     BOWEL HABITS  Types of symptoms: Constipation and other diarrhea     Frequency of bowel movements: every other day   Stool consistency: [place] Stool Scale: 6 & 7   Do you strain with defecation: Yes when   Laxative use: Yes (Truelance)    SEXUAL HEALTH STATUS  Marinoff Scale: N/A  History of Sexual Abuse:No  Sexual [place] Status: Active   Pain with initial and/or deep penetration: yes      [address] presents to physical therapy evaluation with primary c/o constipation or diarrhea and urinary incontinence.  The results of the objective tests and measures show decreased lumbar spine mobility, and presence of pelvic obliquity,  core stability and hip muscles flexbility. Pelvic floor external and internal assessment will be done in subsequent visits. Signs and symptoms are consistent with diagnosis of Pelvic floor dysfunction. Pt and PT discussed evaluation findings, pathology, POC and HEP. Pt voiced understanding and performs HEP correctly without reported pain. Skilled Pelvic Physical Therapy is medically necessary to address the above impairments and reach functional goals. OBJECTIVE:   Posture: Decreased postural awareness and control seated in slouched position but able to correct with cues  Pelvic Alignment:  (+) pelvic obliquity , uneven ASIS L lower than R    Gait: pt ambulates on level ground with normal mechanics. Range Of Motion  Lumbar AROM screen: 50% flexion, 25% extension, lat flex 25% B,rotation  50% B  LE AROM screen: grossly WNL. Strength (MMT) will assess LE next session  Transverse Abdominis: 3-/5    Flexibility Summary: WNL BOUBACAR LE except     Special Tests  (+) Supine to long sitting , L ant rotation , R post rotation of ilium    Informed consent for internal pelvic evaluation given: Yes will be done in subsequent visits  Today's Treatment and Response:   Patient education was provided on objective findings of external and internal evaluation and expectations with treatment outcomes. Educated on bladder normatives, adequate hydration levels, proper toileting posture, instructed in bladder, bowel, and diet diary log and issued handout , stress/urge urinary incontinence strategies, diaphragmatic breathing for PNS activation and pelvic floor relaxation , coordination of diaphragmatic breathing and pelvic floor contraction , and knack/pelvic muscle brace    Patient was instructed in and issued a HEP for: diet/bladder/bowel diary ,diaphragmatic breathing,dietary modification,fiber/fluid intake, toilet positioning, stress management and stretching exercises on lumbopelvic & hip.     Charges: PT Eval Moderate Complexity, Total Timed Treatment: 10 min     Total Treatment Time: 45 min     Based on clinical rationale and outcome measures, this evaluation involved Moderate Complexity decision making due to 1-2 personal factors/comorbidities, 3 body structures involved/activity limitations, and evolving symptoms including stress urinary incontinence, urge urinary incontinence, and constipation/diarhhea  PLAN OF CARE:    Goals: (to be met in 10 visits)  1. The patient  to be educated on pelvic health , role of PT , therapeutic managements & goals as well as strategies for to manage symptoms at home, discussed bladder/bowel diary and home exercises program.  2.The patient to improve awareness of PFM and able to improve ability to contract and relax for full range with coordinated contraction of diaphragm and TrA  3. The patient to report improved urinary incontinence symptoms with good ability to pelvic brace and no leaking with functional activities. 4.The patient to report improved  bowel habits to daily with good bristol scale to 3 or 4. 5. The patient to improve overall with symptoms and function with PFDI-20 score improved to <100 decreasing PFM dysfunction  6. The patient to demonstrate good compliance with the program and able to progress with  core stabilization exercises without  leaking or difficulty . Frequency / Duration: Patient will be seen for 1 x/week or a total of 10 visits over a 90 day period. Treatment will include: Manual Therapy, Neuromuscular Re-education, Therapeutic Activities, Therapeutic Exercise, Home Exercise Program instruction, and biofeedback      Education or treatment limitation: None  Rehab Potential:good      Patient/Family/Caregiver was advised of these findings, precautions, and treatment options and has agreed to actively participate in planning and for this course of care. Thank you for your referral. Please co-sign or sign and return this letter via fax as soon as possible to [phone].  If you have any questions, please contact me at Dept: [phone]    Sincerely,  Electronically signed by therapist: Tata Kahn PT  [de-identified] certification required: Yes  I certify the need for these services furnished under this plan of treatment and while under my care.     X___________________________________________________ Date____________________    Certification From: 5/64/7974  To:11/19/2023

## 2023-10-25 ENCOUNTER — OFFICE VISIT (OUTPATIENT)
Dept: PHYSICAL THERAPY | Age: 33
End: 2023-10-25
Attending: NURSE PRACTITIONER

## 2023-10-25 DIAGNOSIS — K59.09 CHRONIC CONSTIPATION: Primary | ICD-10-CM

## 2023-10-25 PROCEDURE — 97110 THERAPEUTIC EXERCISES: CPT

## 2023-10-25 PROCEDURE — 97140 MANUAL THERAPY 1/> REGIONS: CPT

## 2023-10-25 NOTE — PROGRESS NOTES
Diagnosis:   Chronic constipation  Pelvic floor dysfunction in female       Referring Provider: Shannan Ham  Date of Evaluation:    8/21/2023    Precautions:  None Next MD visit:   none scheduled  Date of Surgery: n/a   Insurance Primary/Secondary: Ben Strange / N/A     # Auth Visits: N/A            Subjective: Pt reports she took the Trulance on sunday and she had more formed bowel on monday and tuesday and so she has not taken it today so far. Her back pain is also better lately. Pain: 3/10 LBP prior to tx  Objective:   Hip add ROM: 30 deg  BLE ( improved)     (Assessed 10/18/2023)  (+) pelvic obliquity R ASIS lower than L   Decreased and tender to thoracolumbar unilateral PA mobs  Hamstring length: L 35 deg , R 45 deg      Assessment:   The patient is progressing with improved pain back pain her bowel habit lately. Reassessment and PFM Internal reassessment for progress note and go over goals for therapy next visit. Goals:  (to be met in 10 visits)  1. The patient  to be educated on pelvic health , role of PT , therapeutic managements & goals as well as strategies for to manage symptoms at home, discussed bladder/bowel diary and home exercises program.  2.The patient to improve awareness of PFM and able to improve ability to contract and relax for full range with coordinated contraction of diaphragm and TrA  3. The patient to report improved urinary incontinence symptoms with good ability to pelvic brace and no leaking with functional activities. 4.The patient to report improved  bowel habits to daily with good bristol scale to 3 or 4. 5. The patient to improve overall with symptoms and function with PFDI-20 score improved to <100 decreasing PFM dysfunction  6. The patient to demonstrate good compliance with the program and able to progress with  core stabilization exercises without  leaking or difficulty . Plan: Reassessment for progress not next visit.   Date: 8/30/2023  TX#: 2/10 Date: 9/11/2023           TX#: 3/10 Date: 9/20/203              TX#: 4/10 Date:9/27/2023              TX#: 5/10 Date:10/04/2023  Tx#: 6/10 Date:10/11/2023  Tx#: 7/10 Date:10/18/2023  Tx#: 8/10 Date:10/18/2023  Tx#: 9/10   Thera Act 16'  Review of bladder/bowel diary  Disscussed modification/increasing fluid and fiber supplement in addition to her meds. Added HEP for lumbopelvic flexibility. Thera Act 10'  Disscussed effects of adding benefiber to her intake and modifying it to 2x a day with trulance every other day. Supine Box breathing/relaxation with hip add stretching Thera Act 18'  Discussion on modification on fiber supplement.   Supine Box breathing/relaxation with hip add stretching  Prone 360 diaphragmatic breathing  Thera Act 25'  PFM External/ internal assessment  Supine Box breathing/relaxation with hip add stretching  Sidelying and prone 360 diaphragmatic breathing Thera Ex:15'  Diaphragmatic breathing with strething  Cat and camel stretch with hip in IR and ER  Quadroped rocking with hip matrices x 5 each plane of movement  LTR and open book stretch Thera Ex: 15'  Nustep L 5 x 5'  SB roll up BUE and thoracic stretch OH and diagonal stretches  Passive Hamstring and piriformis stretch BLE  Thera Ex: 20'  Nustep L 5 x 6'  SB roll up BUE and thoracic stretch OH and diagonal stretches  Mod thread the needle 5x R/L   Passive Hamstring ,adductors and piriformis stretch BLE   Prone press up 5x2   Manual Tx 20'  Abdominal mobilization/colon massage  Static cupping tech to thoracolumbar Manual Tx 23'  Hip add MET , STM/MFR/muscle mobs , alt michele hip abd/add  Sacral PA mobs  Static cupping tech to thoracolumbar Manual Tx 25'  Thoracic PA mobs  Trigger point/MFR  Lumbosacral PA mobs  Sliding cup on thoracolumbar paraspinals Manual Tx 25'  Static cupping to thoracolumbar paraspinals followed by  IASTM/STM   Trigger point/MFR  Thoracic CPA mobs       Manual Tx 20'  Static cupping to thoracolumbar paraspinals followed by  IASTM/STM   Trigger point/MFR  Thoracic CPA mobs Manual Tx 30'  Static cupping to thoracolumbar paraspinals followed by  IASTM/STM   Trigger point/MFR  Thoracic CPA mobs  Contract relax on hip add muscles Manual Tx 30'  Static cupping to thoracolumbar paraspinals followed by  C/UPA mobs to thoracolumbar   Hip ER PA mobs BLE  Hip lateral glides with belt BLE Manual Tx 25'  Static cupping to thoracolumbar paraspinals followed by  C/UPA mobs to thoracolumbar   Hip ER PA mobs BLE  Hip lateral glides with belt BLE   Thera ex:12'  Passive to active HS & piriformis stretch  Open book R/L  LTR stretch R/L  Hip ER/IR stretches   Thera ex:20'  Female chain reaction against the wall calf stretch with hip matrices R/L  Passive hip add & piriformis stretch  Prone passive hip ER/IR stretches to Active motion  Thera ex:20'  360 diaphragmatic breathing. After manual Tx , passive to active hip stretches  Female chain reaction against the wall calf stretch with hip matrices R/L  SB wall roll up thoracic Ext stretches, rotation   SB wall squat w/ shoulder level #2 bar  3 x 5                 HEP: Diaphragmatic breathing/box breathing, Happy baby stretch,adductor stretch    Charges: Thera Ex x 1 ManualTx x 2       Total Timed Treatment: 45min  Total Treatment Time: 45 min        Diagnosis:   Chronic constipation  Pelvic floor dysfunction in female      Referring Provider: Costa Tolbert  Date of Evaluation:    8/21/2023    Precautions:  None Next MD visit:   none scheduled  Date of Surgery: Sophia Cody is a 34year old female  who presents to therapy today with complaints of chronic diarrhea or constipation also have urge and stress incontinence symptoms. Pt states she had history of diarrhea for months , then followed by constipation started last year. She has tried linzess but was not doing well and have tried taking metamucil but would cause stomach pain, so currently taking  truelance which seem helping her.     Current symptoms include: back pain, abdominal pain, rectal pain, and constipation, urinary incomplete emptying,     Pt describes pain level: current 0/10, best 0/10, worst 8/10. LBP   Quality: dull and aching  back or abdominal pain     Pregnant Now: No  Obstetrical/Gynecological history: Last menstrual period: Started 8/17/2023  Manometry: Testing was done. 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 Significant findings include fibromyalgia , chronic back pain w/ history of work injury,hemorrhoids, chronic fatigue,abdominal pain,irregular bowel habits,depression,Hx of depression,anxiety,arthritis,headache disorder      URINARY HABITS  Types of symptoms: stress incontinence, urge incontinence, incomplete emptying, and nocturia  Events associated with the onset of urinary complaints: extenral stressors, emotional/phsychological state  Abdominal/Vaginal Pressure complaints: occassional/intermittent  Urinary Frequency: will fill out bladder diary. Leaking occurs: cough or sneez  Amount of leakage: min  Pad use: No  Pad Change frequency: N/A  Bladder irritants: Cola/ sodas   Post void dribble: Yes  Hovering: Yes  Empty bladder just in case: yes  Do you ever leak urine without knowing it? No     BOWEL HABITS  Types of symptoms: Constipation and other diarrhea     Frequency of bowel movements: every other day   Stool consistency: [place] Stool Scale: 6 & 7   Do you strain with defecation: Yes when   Laxative use: Yes (Truelance)    SEXUAL HEALTH STATUS  Marinoff Scale: N/A  History of Sexual Abuse:No  Sexual [place] Status: Active   Pain with initial and/or deep penetration: yes      [address] presents to physical therapy evaluation with primary c/o constipation or diarrhea and urinary incontinence.  The results of the objective tests and measures show decreased lumbar spine mobility, and presence of pelvic obliquity,  core stability and hip muscles flexbility. Pelvic floor external and internal assessment will be done in subsequent visits. Signs and symptoms are consistent with diagnosis of Pelvic floor dysfunction. Pt and PT discussed evaluation findings, pathology, POC and HEP. Pt voiced understanding and performs HEP correctly without reported pain. Skilled Pelvic Physical Therapy is medically necessary to address the above impairments and reach functional goals. OBJECTIVE:   Posture: Decreased postural awareness and control seated in slouched position but able to correct with cues  Pelvic Alignment:  (+) pelvic obliquity , uneven ASIS L lower than R    Gait: pt ambulates on level ground with normal mechanics. Range Of Motion  Lumbar AROM screen: 50% flexion, 25% extension, lat flex 25% B,rotation  50% B  LE AROM screen: grossly WNL. Strength (MMT) will assess LE next session  Transverse Abdominis: 3-/5    Flexibility Summary: WNL BOUBACAR LE except     Special Tests  (+) Supine to long sitting , L ant rotation , R post rotation of ilium    Informed consent for internal pelvic evaluation given: Yes will be done in subsequent visits  Today's Treatment and Response:   Patient education was provided on objective findings of external and internal evaluation and expectations with treatment outcomes.  Educated on bladder normatives, adequate hydration levels, proper toileting posture, instructed in bladder, bowel, and diet diary log and issued handout , stress/urge urinary incontinence strategies, diaphragmatic breathing for PNS activation and pelvic floor relaxation , coordination of diaphragmatic breathing and pelvic floor contraction , and knack/pelvic muscle brace    Patient was instructed in and issued a HEP for: diet/bladder/bowel diary ,diaphragmatic breathing,dietary modification,fiber/fluid intake, toilet positioning, stress management and stretching exercises on lumbopelvic & hip. Charges: PT Eval Moderate Complexity,       Total Timed Treatment: 10 min     Total Treatment Time: 45 min     Based on clinical rationale and outcome measures, this evaluation involved Moderate Complexity decision making due to 1-2 personal factors/comorbidities, 3 body structures involved/activity limitations, and evolving symptoms including stress urinary incontinence, urge urinary incontinence, and constipation/diarhhea  PLAN OF CARE:    Goals: (to be met in 10 visits)  1. The patient  to be educated on pelvic health , role of PT , therapeutic managements & goals as well as strategies for to manage symptoms at home, discussed bladder/bowel diary and home exercises program.  2.The patient to improve awareness of PFM and able to improve ability to contract and relax for full range with coordinated contraction of diaphragm and TrA  3. The patient to report improved urinary incontinence symptoms with good ability to pelvic brace and no leaking with functional activities. 4.The patient to report improved  bowel habits to daily with good bristol scale to 3 or 4. 5. The patient to improve overall with symptoms and function with PFDI-20 score improved to <100 decreasing PFM dysfunction  6. The patient to demonstrate good compliance with the program and able to progress with  core stabilization exercises without  leaking or difficulty . Frequency / Duration: Patient will be seen for 1 x/week or a total of 10 visits over a 90 day period. Treatment will include: Manual Therapy, Neuromuscular Re-education, Therapeutic Activities, Therapeutic Exercise, Home Exercise Program instruction, and biofeedback      Education or treatment limitation: None  Rehab Potential:good      Patient/Family/Caregiver was advised of these findings, precautions, and treatment options and has agreed to actively participate in planning and for this course of care.     Thank you for your referral. Please co-sign or sign and return this letter via fax as soon as possible to [phone]. If you have any questions, please contact me at Dept: [phone]    Sincerely,  Electronically signed by therapist: Guillermina Mccain, PT  [de-identified] certification required: Yes  I certify the need for these services furnished under this plan of treatment and while under my care.     X___________________________________________________ Date____________________    Certification From: 1/95/3836  To:11/19/2023

## 2023-10-27 ENCOUNTER — HOSPITAL ENCOUNTER (OUTPATIENT)
Dept: GENERAL RADIOLOGY | Age: 33
Discharge: HOME | End: 2023-10-27
Attending: STUDENT IN AN ORGANIZED HEALTH CARE EDUCATION/TRAINING PROGRAM

## 2023-10-27 ENCOUNTER — OFFICE VISIT (OUTPATIENT)
Dept: ORTHOPEDICS CLINIC | Facility: CLINIC | Age: 33
End: 2023-10-27

## 2023-10-27 ENCOUNTER — OFFICE VISIT (OUTPATIENT)
Dept: OBGYN CLINIC | Facility: CLINIC | Age: 33
End: 2023-10-27

## 2023-10-27 VITALS — BODY MASS INDEX: 23.53 KG/M2 | WEIGHT: 127.88 LBS | HEIGHT: 62 IN

## 2023-10-27 VITALS
BODY MASS INDEX: 23.53 KG/M2 | WEIGHT: 127.88 LBS | HEIGHT: 62 IN | DIASTOLIC BLOOD PRESSURE: 79 MMHG | HEART RATE: 116 BPM | SYSTOLIC BLOOD PRESSURE: 121 MMHG

## 2023-10-27 DIAGNOSIS — M54.41 CHRONIC BILATERAL LOW BACK PAIN WITH BILATERAL SCIATICA: ICD-10-CM

## 2023-10-27 DIAGNOSIS — Z01.419 WELL WOMAN EXAM WITH ROUTINE GYNECOLOGICAL EXAM: Primary | ICD-10-CM

## 2023-10-27 DIAGNOSIS — G89.29 CHRONIC BILATERAL LOW BACK PAIN WITH BILATERAL SCIATICA: ICD-10-CM

## 2023-10-27 DIAGNOSIS — M54.42 CHRONIC BILATERAL LOW BACK PAIN WITH BILATERAL SCIATICA: ICD-10-CM

## 2023-10-27 DIAGNOSIS — R20.2 NUMBNESS AND TINGLING OF BOTH FEET: Primary | ICD-10-CM

## 2023-10-27 DIAGNOSIS — Z12.4 CERVICAL CANCER SCREENING: ICD-10-CM

## 2023-10-27 DIAGNOSIS — M54.9 BACK PAIN, UNSPECIFIED BACK LOCATION, UNSPECIFIED BACK PAIN LATERALITY, UNSPECIFIED CHRONICITY: ICD-10-CM

## 2023-10-27 DIAGNOSIS — R20.0 NUMBNESS AND TINGLING OF BOTH FEET: Primary | ICD-10-CM

## 2023-10-27 PROCEDURE — 87624 HPV HI-RISK TYP POOLED RSLT: CPT

## 2023-10-27 PROCEDURE — 72050 X-RAY EXAM NECK SPINE 4/5VWS: CPT | Performed by: STUDENT IN AN ORGANIZED HEALTH CARE EDUCATION/TRAINING PROGRAM

## 2023-10-27 PROCEDURE — 99395 PREV VISIT EST AGE 18-39: CPT

## 2023-10-27 PROCEDURE — 99204 OFFICE O/P NEW MOD 45 MIN: CPT | Performed by: STUDENT IN AN ORGANIZED HEALTH CARE EDUCATION/TRAINING PROGRAM

## 2023-10-27 PROCEDURE — 3008F BODY MASS INDEX DOCD: CPT | Performed by: STUDENT IN AN ORGANIZED HEALTH CARE EDUCATION/TRAINING PROGRAM

## 2023-10-27 PROCEDURE — 3078F DIAST BP <80 MM HG: CPT

## 2023-10-27 PROCEDURE — 3008F BODY MASS INDEX DOCD: CPT

## 2023-10-27 PROCEDURE — 88175 CYTOPATH C/V AUTO FLUID REDO: CPT

## 2023-10-27 PROCEDURE — 72100 X-RAY EXAM L-S SPINE 2/3 VWS: CPT | Performed by: STUDENT IN AN ORGANIZED HEALTH CARE EDUCATION/TRAINING PROGRAM

## 2023-10-27 PROCEDURE — 3074F SYST BP LT 130 MM HG: CPT

## 2023-10-27 NOTE — PROGRESS NOTES
Isabell Hernandez is a 34year old female  Patient's last menstrual period was 10/18/2023 (exact date). Patient presents with:  New Patient  Wellness Visit: Last pap smear was about 5yrs ago   Gyn Problem: During PFT, her PT did an exam and noticed two nodules on each side of her vagina   Other: Patient has declined the flu vaccine today. .    OBSTETRICS HISTORY:  OB History    Para Term  AB Living   0 0 0 0 0 0   SAB IAB Ectopic Multiple Live Births   0 0 0 0 0       GYNE HISTORY:  Periods regular monthly    Sexual activity:   Yes      Partners:   Male      Birth control/ protection:   Condom        Hx Prior Abnormal Pap: No  Pap Date:  (5yrs ago per patient)        MEDICAL HISTORY:  Past Medical History:   Diagnosis Date    Abdominal pain 2022    All over pain, now localized to upper right/mid left    Anxiety 2020    Diagnosed with anxiety    Arthritis 2020    Arthritic changes to upper back    Back pain 2017    L5/S1 bulging disc, annular tear, fibromyalgia    Back problem 2018    L5 BULGING DISC    Belching 2022    I noticed more frequent burping but it could also just be me    Bleeding nose     Bleeding gums only brushing, improved with dental cleanings    Bloating     I always feel like I'm bloated    Blood in the stool 2022    Enough blood to fill toilet or just when wiping    Blood in urine 2021, 22    Not visible blood, microscopic, no infection    Body piercing 2021    double pierced ear lobes, daith piercing fell out recently    Change in hair     Constipation 2022    After a lot of diarrhea, constipation became more frequent    Diarrhea, unspecified 2022    Every hour for whole day, got better but still frequent    Dizziness 2022    Slight dizziness when i was nauseous.  Had to lay down    Fatigue     I have fibromyalgia so i am always tired    Fever 2022    I had lowgrade 99 fever with body aches but not since then    Fibromyalgia     Frequent urination 05/2021    I was peeing every hour. Seems to be better now    Frequent use of laxatives     Headache disorder 2018    I have migraines once a month for a week at a time    Heartburn     Occassional heart burn, not severe    Hemorrhoids 05/2022    Prinary care confirmed past hemrrhoids with anal exam    History of cardiac murmur 04/2022    Sinus arrhythmia, normal    History of depression 03/2020    Diagnosed with depression    Indigestion 06/26/2022    I couldn't eat anything without diarrhea    Irregular bowel habits 06/26/2022    Elaina always have irregular habits but worse now    Itch of skin 01/04/2020    Chronic idiopathic urticaria    Leaking of urine 05/2021    I will pee and then leak even after i am done going    Leg swelling 11/2021    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  Stabbing pain left mid abdomen    Painful urination 05/2021    There were times i had to push when i pee    Sleep disturbance 08/2022    I had 2-3 weeks of insomnia, now i am sleeping normally    Stress     Uncomfortable fullness after meals     Doesn't always happen, not too much anymore    Visual impairment     GLASSES    Wears glasses 2000    I've had glasses since i was a child, nearsighted    Weight gain 02/2022    i was always hungry and would feel weak if i didn't eat    Weight loss 07/08/2022    I lost a few lbs after being sick       SURGICAL HISTORY:  Past Surgical History:   Procedure Laterality Date    Colonoscopy  01/01/2023    Removal of ovarian cyst(s)  01/2019       SOCIAL HISTORY:  Social History    Socioeconomic History      Marital status:       Spouse name: Not on file      Number of children: Not on file      Years of education: Not on file      Highest education level: Not on file    Occupational History      Not on file    Tobacco Use      Smoking status: Never      Smokeless tobacco: Never      Tobacco comments: Denied tobacco use    Vaping Use      Vaping Use: Never used    Substance and Sexual Activity      Alcohol use: Not Currently        Comment: Rare       Drug use: Never      Sexual activity: Yes        Partners: Male        Birth control/protection: Condom    Other Topics      Concerns:         Service: Not Asked        Blood Transfusions: Not Asked        Caffeine Concern: No        Occupational Exposure: Not Asked        Hobby Hazards: Not Asked        Sleep Concern: Not Asked        Stress Concern: Not Asked        Weight Concern: Not Asked        Special Diet: Not Asked        Back Care: Not Asked        Exercise: No        Bike Helmet: Not Asked        Seat Belt: Not Asked        Self-Exams: Not Asked    Social History Narrative      Not on file    Social Determinants of Health  Financial Resource Strain: Not on file  Food Insecurity: Not on file  Transportation Needs: Not on file  Physical Activity: Not on file  Stress: Not on file  Social Connections: Not on file  Housing Stability: Not on file    FAMILY HISTORY:  Family History   Problem Relation Age of Onset    Allergies Father     Other (Parkinson's disease) Father     Hypertension Mother     Allergies Mother     Colon Cancer Maternal Grandmother         She had colon cancer that spread to other organs    Heart Attack Maternal Grandfather          of a heart attack, smoker    Osteoporosis Paternal Grandmother     Bipolar Disorder Sister     Psoriasis Sister     Substance Abuse Brother     Diabetes Maternal Aunt     Diabetes Paternal Uncle     Other (cancer unsure what type) Paternal Uncle MEDICATIONS:    Current Outpatient Medications:     TRULANCE 3 MG Oral Tab, Take 1 tablet by mouth daily. , Disp: , Rfl:     B Complex Vitamins (B COMPLEX 1 OR), Take by mouth., Disp: , Rfl:     Magnesium 100 MG Oral Tab, Take by mouth., Disp: , Rfl:     fexofenadine 180 MG Oral Tab, Take 1 tablet (180 mg total) by mouth daily. , Disp: , Rfl:     cetirizine 10 MG Oral Tab, Take 1 tablet (10 mg total) by mouth daily. , Disp: , Rfl:     diphenhydrAMINE HCl (BENADRYL ALLERGY OR), Take by mouth., Disp: , Rfl:     ALLERGIES:    Cat Hair Extract        HIVES, ITCHING, Runny nose  Duloxetine Hcl          ANXIETY  Gabapentin              ANXIETY, FATIGUE, OTHER (SEE                            COMMENTS)  Peanuts                 ITCHING  Diclofenac              DIARRHEA, OTHER (SEE COMMENTS)  Diclofenac Sodium       DIARRHEA, OTHER (SEE COMMENTS)  Lyrica                  INSOMNIA  Nickel                  RASH  Nickel                  RASH  Pregabalin              INSOMNIA  Seasonal                Runny nose, OTHER (SEE COMMENTS)    Comment:Sneezing, watery eyes      Review of Systems:  Constitutional:  Denies fatigue, night sweats, hot flashes  Eyes:  denies blurred or double vision  Cardiovascular:  denies chest pain or palpitations  Respiratory:  denies shortness of breath  Gastrointestinal:  denies heartburn, abdominal pain, diarrhea or constipation  Genitourinary:  denies dysuria, incontinence, abnormal vaginal discharge, vaginal itching  Musculoskeletal:  denies back pain. Skin/Breast:  Denies any breast pain, lumps, or discharge. Neurological:  denies headaches, extremity weakness or numbness. Psychiatric: denies depression or anxiety. Endocrine:   denies excessive thirst or urination. Heme/Lymph:  denies history of anemia, easy bruising or bleeding.       PHYSICAL EXAM:   Constitutional: well developed, well nourished  Head/Face: normocephalic  Neck/Thyroid: thyroid symmetric, no thyromegaly, no nodules, no adenopathy  Lymphatic:no abnormal supraclavicular or axillary adenopathy is noted  Breast: normal without palpable masses, tenderness, asymmetry, nipple discharge, nipple retraction or skin changes  Abdomen:  soft, nontender, nondistended, no masses  Skin/Hair: no unusual rashes or bruises  Extremities: no edema, no cyanosis  Psychiatric:  Oriented to time, place, person and situation. Appropriate mood and affect    Pelvic Exam:  External Genitalia: normal appearance, hair distribution, and no lesions  Urethral Meatus:  normal in size, location, without lesions and prolapse  Bladder:  No fullness, masses or tenderness  Vagina:  Normal appearance without lesions, no abnormal discharge  Cervix:  Normal without tenderness on motion  Uterus: normal in size, contour, position, mobility, without tenderness  Adnexa: normal without masses or tenderness  Perineum: normal  Anus: no hemorroids     Assessment & Plan:  Diagnoses and all orders for this visit:    Well woman exam with routine gynecological exam    Cervical cancer screening  -     Hpv High Risk , Thin Prep Collect;  Future  -     ThinPrep PAP Smear B; Future

## 2023-10-30 LAB — HPV I/H RISK 1 DNA SPEC QL NAA+PROBE: NEGATIVE

## 2023-11-01 ENCOUNTER — OFFICE VISIT (OUTPATIENT)
Dept: PHYSICAL THERAPY | Age: 33
End: 2023-11-01
Attending: NURSE PRACTITIONER
Payer: COMMERCIAL

## 2023-11-01 DIAGNOSIS — M62.89 PELVIC FLOOR DYSFUNCTION IN FEMALE: Primary | ICD-10-CM

## 2023-11-01 PROCEDURE — 97110 THERAPEUTIC EXERCISES: CPT

## 2023-11-01 PROCEDURE — 97140 MANUAL THERAPY 1/> REGIONS: CPT

## 2023-11-01 PROCEDURE — 97530 THERAPEUTIC ACTIVITIES: CPT

## 2023-11-01 NOTE — PROGRESS NOTES
Diagnosis:   Chronic constipation  Pelvic floor dysfunction in female       Referring Provider: Bakari Diaz  Date of Evaluation:    8/21/2023    Precautions:  None Next MD visit:   none scheduled  Date of Surgery: n/a   Insurance Primary/Secondary: [employer] / N/A     # Auth Visits: N/A              Progress Summary  Pt has attended 10 visits in Physical Therapy. Subjective: Pt states she is very sore again lately, yesterday is more worse than today, it could have been from bending a lot on monday taking garbage out. She also mentions that the spine doctor has told her that there is nothing that he can help her with. Pain: 5/10 LBP prior to tx  Objective:     Hip add ROM: 30 deg  BLE   (+) pelvic obliquity R ASIS lower than L   Hamstring length: L 40 deg , R 45 deg\  Internal PFM reassessment : mod tenderness and tightness R>L       Assessment: Obtained verbal consent to reasses internal PFM muscles. Pt continues to have painful and tender to palpation on PFM, discussed used of pelvic want for self STM and will follow up with it in the clinic. She needs to continue doing her stretching, DB, stress management and pelvic wand. She will be seen 2 more visit for pelvic floor PT and may continue to benefit from skilled therapy services focusing on her LBP. Goals:  (to be met in 10 visits)  1. The patient  to be educated on pelvic health , role of PT , therapeutic managements & goals as well as strategies for to manage symptoms at home, discussed bladder/bowel diary and home exercises program.Met  2. The patient to improve awareness of PFM and able to improve ability to contract and relax for full range with coordinated contraction of diaphragm and TrA,Progressing   3. The patient to report improved urinary incontinence symptoms with good ability to pelvic brace and no leaking with functional activities,Progressing  4. The patient to report improved  bowel habits to daily with good bristol scale to 3 or 4,Progressing 5.The patient to improve overall with symptoms and function with PFDI-20 score improved to <100 decreasing PFM dysfunction  6. The patient to demonstrate good compliance with the program and able to progress with  core stabilization exercises without  leaking or difficulty,Progressing  Added goals : Pt to demonstrate decreased tenderness and tightness on levator ani muscles with hip add mobility/ROM to 40 deg  Plan: Will continue work on decreasing soft tissue restriction and down training of PFM for 2 more visits. Date: 8/30/2023  TX#: 2/10 Date: 9/11/2023           TX#: 3/10 Date: 9/20/203              TX#: 4/10 Date:9/27/2023              TX#: 5/10 Date:10/04/2023  Tx#: 6/10 Date:10/11/2023  Tx#: 7/10 Date:10/18/2023  Tx#: 8/10 Date:10/25/2023  Tx#: 9/10 Date:11/1/2023  Tx#: 10/10   Thera Act 16'  Review of bladder/bowel diary  Disscussed modification/increasing fluid and fiber supplement in addition to her meds. Added HEP for lumbopelvic flexibility. Thera Act 10'  Disscussed effects of adding benefiber to her intake and modifying it to 2x a day with trulance every other day. Supine Box breathing/relaxation with hip add stretching Thera Act 18'  Discussion on modification on fiber supplement.   Supine Box breathing/relaxation with hip add stretching  Prone 360 diaphragmatic breathing  Thera Act 25'  PFM External/ internal assessment  Supine Box breathing/relaxation with hip add stretching  Sidelying and prone 360 diaphragmatic breathing Thera Ex:15'  Diaphragmatic breathing with strething  Cat and camel stretch with hip in IR and ER  Quadroped rocking with hip matrices x 5 each plane of movement  LTR and open book stretch Thera Ex: 15'  Nustep L 5 x 5'  SB roll up BUE and thoracic stretch OH and diagonal stretches  Passive Hamstring and piriformis stretch BLE  Thera Ex: 20'  Nustep L 5 x 6'  SB roll up BUE and thoracic stretch OH and diagonal stretches  Mod thread the needle 5x R/L   Passive Hamstring ,adductors and piriformis stretch BLE   Prone press up 5x2 Thera Act: 10'  Internal PFM Assessment  PFM bearing down cues supine then seated on pelvicore ball w/DB  Thera Ex:10'  Passive Hamstring ,adductors and piriformis stretch BLE  LTR stretch R/L passive to active   Manual Tx 20'  Abdominal mobilization/colon massage  Static cupping tech to thoracolumbar Manual Tx 23'  Hip add MET , STM/MFR/muscle mobs , alt michele hip abd/add  Sacral PA mobs  Static cupping tech to thoracolumbar Manual Tx 25'  Thoracic PA mobs  Trigger point/MFR  Lumbosacral PA mobs  Sliding cup on thoracolumbar paraspinals Manual Tx 25'  Static cupping to thoracolumbar paraspinals followed by  IASTM/STM   Trigger point/MFR  Thoracic CPA mobs       Manual Tx 20'  Static cupping to thoracolumbar paraspinals followed by  IASTM/STM   Trigger point/MFR  Thoracic CPA mobs Manual Tx 30'  Static cupping to thoracolumbar paraspinals followed by  IASTM/STM   Trigger point/MFR  Thoracic CPA mobs  Contract relax on hip add muscles Manual Tx 30'  Static cupping to thoracolumbar paraspinals followed by  C/UPA mobs to thoracolumbar   Hip ER PA mobs BLE  Hip lateral glides with belt BLE Manual Tx 25'  Static cupping to thoracolumbar paraspinals followed by  C/UPA mobs to thoracolumbar   Hip ER PA mobs BLE  Hip lateral glides with belt BLE Manual Tx 25'  Static cupping to thoracolumbar paraspinals   MET to correct pelvic obliquity with shotgun tech  Contract relax on hip add    Thera ex:12'  Passive to active HS & piriformis stretch  Open book R/L  LTR stretch R/L  Hip ER/IR stretches   Thera ex:20'  Female chain reaction against the wall calf stretch with hip matrices R/L  Passive hip add & piriformis stretch  Prone passive hip ER/IR stretches to Active motion  Thera ex:20'  360 diaphragmatic breathing.   After manual Tx , passive to active hip stretches  Female chain reaction against the wall calf stretch with hip matrices R/L  SB wall roll up thoracic Ext stretches, rotation   SB wall squat w/ shoulder level #2 bar  3 x 5                   HEP: Diaphragmatic breathing/box breathing, Happy baby stretch,adductor stretch    Charges: Thera Ex x 1 ManualTx x 2       Total Timed Treatment: 45min  Total Treatment Time: 45 min        Diagnosis:   Chronic constipation  Pelvic floor dysfunction in female      Referring Provider: Tenzin Valverde  Date of Evaluation:    8/21/2023    Precautions:  None Next MD visit:   none scheduled  Date of Surgery: Antony Argueta is a 34year old female  who presents to therapy today with complaints of chronic diarrhea or constipation also have urge and stress incontinence symptoms. Pt states she had history of diarrhea for months , then followed by constipation started last year. She has tried linzess but was not doing well and have tried taking metamucil but would cause stomach pain, so currently taking  truelance which seem helping her. Current symptoms include: back pain, abdominal pain, rectal pain, and constipation, urinary incomplete emptying,     Pt describes pain level: current 0/10, best 0/10, worst 8/10. LBP   Quality: dull and aching  back or abdominal pain     Pregnant Now: No  Obstetrical/Gynecological history: Last menstrual period: Started 8/17/2023  Manometry: Testing was done. Occupation/Activities: work from home , infront of computer   PFDI-20: 144.79 /300     Pt goals include : To improve her symptoms to be able to function better    Past medical history was reviewed with [facility] [place].  Significant findings include fibromyalgia , chronic back pain w/ history of work injury,hemorrhoids, chronic fatigue,abdominal pain,irregular bowel habits,depression,Hx of depression,anxiety,arthritis,headache disorder      URINARY HABITS  Types of symptoms: stress incontinence, urge incontinence, incomplete emptying, and nocturia  Events associated with the onset of urinary complaints: extenral stressors, emotional/phsychological state  Abdominal/Vaginal Pressure complaints: occassional/intermittent  Urinary Frequency: will fill out bladder diary. Leaking occurs: cough or sneez  Amount of leakage: min  Pad use: No  Pad Change frequency: N/A  Bladder irritants: Cola/ sodas   Post void dribble: Yes  Hovering: Yes  Empty bladder just in case: yes  Do you ever leak urine without knowing it? No     BOWEL HABITS  Types of symptoms: Constipation and other diarrhea     Frequency of bowel movements: every other day   Stool consistency: [place] Stool Scale: 6 & 7   Do you strain with defecation: Yes when   Laxative use: Yes (Truelance)    SEXUAL HEALTH STATUS  Marinoff Scale: N/A  History of Sexual Abuse:No  Sexual [place] Status: Active   Pain with initial and/or deep penetration: yes      [address] presents to physical therapy evaluation with primary c/o constipation or diarrhea and urinary incontinence. The results of the objective tests and measures show decreased lumbar spine mobility, and presence of pelvic obliquity,  core stability and hip muscles flexbility. Pelvic floor external and internal assessment will be done in subsequent visits. Signs and symptoms are consistent with diagnosis of Pelvic floor dysfunction. Pt and PT discussed evaluation findings, pathology, POC and HEP. Pt voiced understanding and performs HEP correctly without reported pain. Skilled Pelvic Physical Therapy is medically necessary to address the above impairments and reach functional goals. OBJECTIVE:   Posture: Decreased postural awareness and control seated in slouched position but able to correct with cues  Pelvic Alignment:  (+) pelvic obliquity , uneven ASIS L lower than R    Gait: pt ambulates on level ground with normal mechanics. Range Of Motion  Lumbar AROM screen: 50% flexion, 25% extension, lat flex 25% B,rotation  50% B  LE AROM screen: grossly WNL.     Strength (MMT) will assess LE next session  Transverse Abdominis: 3-/5    Flexibility Summary: WNL BOUBACAR LE except     Special Tests  (+) Supine to long sitting , L ant rotation , R post rotation of ilium    Informed consent for internal pelvic evaluation given: Yes will be done in subsequent visits  Today's Treatment and Response:   Patient education was provided on objective findings of external and internal evaluation and expectations with treatment outcomes. Educated on bladder normatives, adequate hydration levels, proper toileting posture, instructed in bladder, bowel, and diet diary log and issued handout , stress/urge urinary incontinence strategies, diaphragmatic breathing for PNS activation and pelvic floor relaxation , coordination of diaphragmatic breathing and pelvic floor contraction , and knack/pelvic muscle brace    Patient was instructed in and issued a HEP for: diet/bladder/bowel diary ,diaphragmatic breathing,dietary modification,fiber/fluid intake, toilet positioning, stress management and stretching exercises on lumbopelvic & hip. Charges: PT Eval Moderate Complexity,       Total Timed Treatment: 10 min     Total Treatment Time: 45 min     Based on clinical rationale and outcome measures, this evaluation involved Moderate Complexity decision making due to 1-2 personal factors/comorbidities, 3 body structures involved/activity limitations, and evolving symptoms including stress urinary incontinence, urge urinary incontinence, and constipation/diarhhea  PLAN OF CARE:    Goals: (to be met in 10 visits)  1. The patient  to be educated on pelvic health , role of PT , therapeutic managements & goals as well as strategies for to manage symptoms at home, discussed bladder/bowel diary and home exercises program.  2.The patient to improve awareness of PFM and able to improve ability to contract and relax for full range with coordinated contraction of diaphragm and TrA  3. The patient to report improved urinary incontinence symptoms with good ability to pelvic brace and no leaking with functional activities. 4.The patient to report improved  bowel habits to daily with good bristol scale to 3 or 4. 5. The patient to improve overall with symptoms and function with PFDI-20 score improved to <100 decreasing PFM dysfunction  6. The patient to demonstrate good compliance with the program and able to progress with  core stabilization exercises without  leaking or difficulty . Frequency / Duration: Patient will be seen for 1 x/week or a total of 10 visits over a 90 day period. Treatment will include: Manual Therapy, Neuromuscular Re-education, Therapeutic Activities, Therapeutic Exercise, Home Exercise Program instruction, and biofeedback      Education or treatment limitation: None  Rehab Potential:good      Patient/Family/Caregiver was advised of these findings, precautions, and treatment options and has agreed to actively participate in planning and for this course of care. Thank you for your referral. Please co-sign or sign and return this letter via fax as soon as possible to [phone]. If you have any questions, please contact me at Dept: [phone]    Sincerely,  Electronically signed by therapist: Radha Aguero PT  [de-identified] certification required: Yes  I certify the need for these services furnished under this plan of treatment and while under my care.     X___________________________________________________ Date____________________    Certification From: 2/40/8497  To:11/19/2023

## 2023-11-03 LAB
.: NORMAL
.: NORMAL

## 2023-11-08 ENCOUNTER — OFFICE VISIT (OUTPATIENT)
Dept: PHYSICAL THERAPY | Age: 33
End: 2023-11-08
Attending: NURSE PRACTITIONER
Payer: COMMERCIAL

## 2023-11-08 DIAGNOSIS — M62.89 PELVIC FLOOR DYSFUNCTION IN FEMALE: Primary | ICD-10-CM

## 2023-11-08 PROCEDURE — 97140 MANUAL THERAPY 1/> REGIONS: CPT

## 2023-11-08 PROCEDURE — 97530 THERAPEUTIC ACTIVITIES: CPT

## 2023-11-08 NOTE — PROGRESS NOTES
Diagnosis:   Chronic constipation  Pelvic floor dysfunction in female       Referring Provider: Bette Mcintosh  Date of Evaluation:    8/21/2023    Precautions:  None Next MD visit:   none scheduled  Date of Surgery: n/a   Insurance Primary/Secondary: Lynn Bedolla / N/A     # Auth Visits: N/A              Subjective: The patient has been having move LBP lately. Pain: 5-6/10 LBP prior to tx  Objective:   PFM internal reassessment and treatment    Assessed 11/1/2023#  Hip add ROM: 30 deg  BLE   (+) pelvic obliquity R ASIS lower than L   Hamstring length: L 40 deg , R 45 deg\  Internal PFM reassessment : mod tenderness and tightness R>L       Assessment: Noticeably decreased tenderness and tightness compared to last week. Pt was able to tolerate more pressure during manual treatment. Goals: 1. The patient  to be educated on pelvic health , role of PT , therapeutic managements & goals as well as strategies for to manage symptoms at home, discussed bladder/bowel diary and home exercises program.Met  2. The patient to improve awareness of PFM and able to improve ability to contract and relax for full range with coordinated contraction of diaphragm and TrA,Progressing   3. The patient to report improved urinary incontinence symptoms with good ability to pelvic brace and no leaking with functional activities,Progressing  4. The patient to report improved  bowel habits to daily with good bristol scale to 3 or 4,Progressing   5. The patient to improve overall with symptoms and function with PFDI-20 score improved to <100 decreasing PFM dysfunction  6. The patient to demonstrate good compliance with the program and able to progress with  core stabilization exercises without  leaking or difficulty,Progressing  Added goals : Pt to demonstrate decreased tenderness and tightness on levator ani muscles with hip add mobility/ROM to 40 deg  Plan:  Will continue work on decreasing soft tissue restriction and down training of PFM for 1 more visits. Date: 8/30/2023  TX#: 2/10 Date: 9/11/2023           TX#: 3/10 Date: 9/20/203              TX#: 4/10 Date:9/27/2023              TX#: 5/10 Date:10/04/2023  Tx#: 6/10 Date:10/11/2023  Tx#: 7/10 Date:10/18/2023  Tx#: 8/10 Date:10/25/2023  Tx#: 9/10 Date:11/1/2023  Tx#: 10 Date:11/8/2023  Tx#: 11   Thera Act 16'  Review of bladder/bowel diary  Disscussed modification/increasing fluid and fiber supplement in addition to her meds. Added HEP for lumbopelvic flexibility. Thera Act 10'  Disscussed effects of adding benefiber to her intake and modifying it to 2x a day with trulance every other day. Supine Box breathing/relaxation with hip add stretching Thera Act 18'  Discussion on modification on fiber supplement.   Supine Box breathing/relaxation with hip add stretching  Prone 360 diaphragmatic breathing  Thera Act 25'  PFM External/ internal assessment  Supine Box breathing/relaxation with hip add stretching  Sidelying and prone 360 diaphragmatic breathing Thera Ex:15'  Diaphragmatic breathing with strething  Cat and camel stretch with hip in IR and ER  Quadroped rocking with hip matrices x 5 each plane of movement  LTR and open book stretch Thera Ex: 15'  Nustep L 5 x 5'  SB roll up BUE and thoracic stretch OH and diagonal stretches  Passive Hamstring and piriformis stretch BLE  Thera Ex: 20'  Nustep L 5 x 6'  SB roll up BUE and thoracic stretch OH and diagonal stretches  Mod thread the needle 5x R/L   Passive Hamstring ,adductors and piriformis stretch BLE   Prone press up 5x2 Thera Act: 10'  Internal PFM Assessment  PFM bearing down cues supine then seated on pelvicore ball w/DB  Thera Ex:10'  Passive Hamstring ,adductors and piriformis stretch BLE  LTR stretch R/L passive to active Thera Act: 10'  360 deg diaphragmatic breathing    Manual Tx 20'  Abdominal mobilization/colon massage  Static cupping tech to thoracolumbar Manual Tx 23'  Hip add MET , STM/MFR/muscle mobs , alt michele hip abd/add  Sacral PA mobs  Static cupping tech to thoracolumbar Manual Tx 25'  Thoracic PA mobs  Trigger point/MFR  Lumbosacral PA mobs  Sliding cup on thoracolumbar paraspinals Manual Tx 25'  Static cupping to thoracolumbar paraspinals followed by  IASTM/STM   Trigger point/MFR  Thoracic CPA mobs       Manual Tx 20'  Static cupping to thoracolumbar paraspinals followed by  IASTM/STM   Trigger point/MFR  Thoracic CPA mobs Manual Tx 30'  Static cupping to thoracolumbar paraspinals followed by  IASTM/STM   Trigger point/MFR  Thoracic CPA mobs  Contract relax on hip add muscles Manual Tx 30'  Static cupping to thoracolumbar paraspinals followed by  C/UPA mobs to thoracolumbar   Hip ER PA mobs BLE  Hip lateral glides with belt BLE Manual Tx 25'  Static cupping to thoracolumbar paraspinals followed by  C/UPA mobs to thoracolumbar   Hip ER PA mobs BLE  Hip lateral glides with belt BLE Manual Tx 25'  Static cupping to thoracolumbar paraspinals   MET to correct pelvic obliquity with shotgun tech  Contract relax on hip add  Manual Tx 35'  Static cupping to thoracolumbar paraspinals   MET to correct pelvic obliquity with shotgun tech  Contract relax on hip add and hip rotators  Gluteals STM/MFR  Internal STM   Thera ex:12'  Passive to active HS & piriformis stretch  Open book R/L  LTR stretch R/L  Hip ER/IR stretches   Thera ex:20'  Female chain reaction against the wall calf stretch with hip matrices R/L  Passive hip add & piriformis stretch  Prone passive hip ER/IR stretches to Active motion  Thera ex:20'  360 diaphragmatic breathing. After manual Tx , passive to active hip stretches  Female chain reaction against the wall calf stretch with hip matrices R/L  SB wall roll up thoracic Ext stretches, rotation   SB wall squat w/ shoulder level #2 bar  3 x 5                     HEP: Diaphragmatic breathing/box breathing, Happy baby stretch,adductor stretch    Charges:  Thera act x 1 ManualTx x 2       Total Timed Treatment: 45min  Total Treatment Time: 45 min        Diagnosis:   Chronic constipation  Pelvic floor dysfunction in female      Referring Provider: Paris Orantes  Date of Evaluation:    8/21/2023    Precautions:  None Next MD visit:   none scheduled  Date of Surgery: Shayan Maldonado is a 34year old female  who presents to therapy today with complaints of chronic diarrhea or constipation also have urge and stress incontinence symptoms. Pt states she had history of diarrhea for months , then followed by constipation started last year. She has tried linzess but was not doing well and have tried taking metamucil but would cause stomach pain, so currently taking  truelance which seem helping her. Current symptoms include: back pain, abdominal pain, rectal pain, and constipation, urinary incomplete emptying,     Pt describes pain level: current 0/10, best 0/10, worst 8/10. LBP   Quality: dull and aching  back or abdominal pain     Pregnant Now: No  Obstetrical/Gynecological history: Last menstrual period: Started 8/17/2023  Manometry: Testing was done. Occupation/Activities: work from home , infront of computer   PFDI-20: 144.79 /300     Pt goals include : To improve her symptoms to be able to function better    Past medical history was reviewed with [facility] [place]. Significant findings include fibromyalgia , chronic back pain w/ history of work injury,hemorrhoids, chronic fatigue,abdominal pain,irregular bowel habits,depression,Hx of depression,anxiety,arthritis,headache disorder      URINARY HABITS  Types of symptoms: stress incontinence, urge incontinence, incomplete emptying, and nocturia  Events associated with the onset of urinary complaints: extenral stressors, emotional/phsychological state  Abdominal/Vaginal Pressure complaints: occassional/intermittent  Urinary Frequency: will fill out bladder diary.   Leaking occurs: cough or sneez  Amount of leakage: min  Pad use: No  Pad Change frequency: N/A  Bladder irritants: Cola/ sodas   Post void dribble: Yes  Hovering: Yes  Empty bladder just in case: yes  Do you ever leak urine without knowing it? No     BOWEL HABITS  Types of symptoms: Constipation and other diarrhea     Frequency of bowel movements: every other day   Stool consistency: Braggs Stool Scale: 6 & 7   Do you strain with defecation: Yes when   Laxative use: Yes (Truelance)    SEXUAL HEALTH STATUS  Marinoff Scale: N/A  History of Sexual Abuse:No  Sexual [place] Status: Active   Pain with initial and/or deep penetration: yes      [address] presents to physical therapy evaluation with primary c/o constipation or diarrhea and urinary incontinence. The results of the objective tests and measures show decreased lumbar spine mobility, and presence of pelvic obliquity,  core stability and hip muscles flexbility. Pelvic floor external and internal assessment will be done in subsequent visits. Signs and symptoms are consistent with diagnosis of Pelvic floor dysfunction. Pt and PT discussed evaluation findings, pathology, POC and HEP. Pt voiced understanding and performs HEP correctly without reported pain. Skilled Pelvic Physical Therapy is medically necessary to address the above impairments and reach functional goals. OBJECTIVE:   Posture: Decreased postural awareness and control seated in slouched position but able to correct with cues  Pelvic Alignment:  (+) pelvic obliquity , uneven ASIS L lower than R    Gait: pt ambulates on level ground with normal mechanics. Range Of Motion  Lumbar AROM screen: 50% flexion, 25% extension, lat flex 25% B,rotation  50% B  LE AROM screen: grossly WNL.     Strength (MMT) will assess LE next session  Transverse Abdominis: 3-/5    Flexibility Summary: WNL BUOBACAR LE except     Special Tests  (+) Supine to long sitting , L ant rotation , R post rotation of ilium    Informed consent for internal pelvic evaluation given: Yes will be done in subsequent visits  Today's Treatment and Response:   Patient education was provided on objective findings of external and internal evaluation and expectations with treatment outcomes. Educated on bladder normatives, adequate hydration levels, proper toileting posture, instructed in bladder, bowel, and diet diary log and issued handout , stress/urge urinary incontinence strategies, diaphragmatic breathing for PNS activation and pelvic floor relaxation , coordination of diaphragmatic breathing and pelvic floor contraction , and knack/pelvic muscle brace    Patient was instructed in and issued a HEP for: diet/bladder/bowel diary ,diaphragmatic breathing,dietary modification,fiber/fluid intake, toilet positioning, stress management and stretching exercises on lumbopelvic & hip. Charges: PT Eval Moderate Complexity,       Total Timed Treatment: 10 min     Total Treatment Time: 45 min     Based on clinical rationale and outcome measures, this evaluation involved Moderate Complexity decision making due to 1-2 personal factors/comorbidities, 3 body structures involved/activity limitations, and evolving symptoms including stress urinary incontinence, urge urinary incontinence, and constipation/diarhhea  PLAN OF CARE:    Goals: (to be met in 10 visits)  1. The patient  to be educated on pelvic health , role of PT , therapeutic managements & goals as well as strategies for to manage symptoms at home, discussed bladder/bowel diary and home exercises program.  2.The patient to improve awareness of PFM and able to improve ability to contract and relax for full range with coordinated contraction of diaphragm and TrA  3. The patient to report improved urinary incontinence symptoms with good ability to pelvic brace and no leaking with functional activities. 4.The patient to report improved  bowel habits to daily with good bristol scale to 3 or 4. 5. The patient to improve overall with symptoms and function with PFDI-20 score improved to <100 decreasing PFM dysfunction  6. The patient to demonstrate good compliance with the program and able to progress with  core stabilization exercises without  leaking or difficulty . Frequency / Duration: Patient will be seen for 1 x/week or a total of 10 visits over a 90 day period. Treatment will include: Manual Therapy, Neuromuscular Re-education, Therapeutic Activities, Therapeutic Exercise, Home Exercise Program instruction, and biofeedback      Education or treatment limitation: None  Rehab Potential:good      Patient/Family/Caregiver was advised of these findings, precautions, and treatment options and has agreed to actively participate in planning and for this course of care. Thank you for your referral. Please co-sign or sign and return this letter via fax as soon as possible to [phone]. If you have any questions, please contact me at Dept: [phone]    Sincerely,  Electronically signed by therapist: Peyman Mohan, PT  [de-identified] certification required: Yes  I certify the need for these services furnished under this plan of treatment and while under my care.     X___________________________________________________ Date____________________    Certification From: 3/68/2005  To:11/19/2023

## 2023-11-09 ENCOUNTER — PATIENT MESSAGE (OUTPATIENT)
Dept: FAMILY MEDICINE CLINIC | Facility: CLINIC | Age: 33
End: 2023-11-09

## 2023-11-10 NOTE — TELEPHONE ENCOUNTER
From: [address]  To: Sherrill Valera  Sent: 11/9/2023 6:39 PM CST  Subject: Back flare    Hello, I flared my back last night after physical therapy. It hurts to lay on my back or my side. I had to lay on pillows to sleep because my back would tighten up and it would be really painful. Sitting seems to be ok, so I was able to work today, but i was uncomfortable. Laying down, 8/10, sitting 4/10. I just went to a walk in clinic by my house, but they sat me down in a room for 30 minutes and I heard them say outside there was no doctor there so I left. Dr. Jose Ley took an x ray on 10/27 as a referral from my physical therapist, and he referred me for an MRI, but I think this is just a muscle strain, I've had them before. Is there anyway something can be called in for me tomorrow? I know there are no appointments available. Please let me know, otherwise I can go to a different clinic tomorrow.

## 2023-11-15 ENCOUNTER — APPOINTMENT (OUTPATIENT)
Dept: PHYSICAL THERAPY | Age: 33
End: 2023-11-15
Attending: NURSE PRACTITIONER
Payer: COMMERCIAL

## 2023-12-03 ENCOUNTER — APPOINTMENT (OUTPATIENT)
Dept: URGENT CARE | Age: 33
End: 2023-12-03

## 2024-02-19 ENCOUNTER — OFFICE VISIT (OUTPATIENT)
Dept: FAMILY MEDICINE CLINIC | Facility: CLINIC | Age: 34
End: 2024-02-19
Payer: COMMERCIAL

## 2024-02-19 VITALS
HEART RATE: 84 BPM | SYSTOLIC BLOOD PRESSURE: 118 MMHG | DIASTOLIC BLOOD PRESSURE: 64 MMHG | BODY MASS INDEX: 24.33 KG/M2 | RESPIRATION RATE: 16 BRPM | TEMPERATURE: 97 F | WEIGHT: 132.19 LBS | HEIGHT: 62 IN

## 2024-02-19 DIAGNOSIS — G89.29 CHRONIC PAIN OF BOTH KNEES: ICD-10-CM

## 2024-02-19 DIAGNOSIS — M25.561 CHRONIC PAIN OF BOTH KNEES: ICD-10-CM

## 2024-02-19 DIAGNOSIS — M25.462 PAIN AND SWELLING OF LEFT KNEE: ICD-10-CM

## 2024-02-19 DIAGNOSIS — M25.562 CHRONIC PAIN OF BOTH KNEES: ICD-10-CM

## 2024-02-19 DIAGNOSIS — G89.29 CHRONIC PAIN OF LEFT KNEE: Primary | ICD-10-CM

## 2024-02-19 DIAGNOSIS — I73.00 RAYNAUD'S PHENOMENON WITHOUT GANGRENE: ICD-10-CM

## 2024-02-19 DIAGNOSIS — M79.7 FIBROMYALGIA: ICD-10-CM

## 2024-02-19 DIAGNOSIS — M25.562 CHRONIC PAIN OF LEFT KNEE: Primary | ICD-10-CM

## 2024-02-19 DIAGNOSIS — M25.562 PAIN AND SWELLING OF LEFT KNEE: ICD-10-CM

## 2024-02-19 DIAGNOSIS — M25.50 POLYARTHRALGIA: ICD-10-CM

## 2024-02-19 PROCEDURE — 99214 OFFICE O/P EST MOD 30 MIN: CPT | Performed by: STUDENT IN AN ORGANIZED HEALTH CARE EDUCATION/TRAINING PROGRAM

## 2024-02-19 RX ORDER — MULTIVIT-MIN/IRON/FOLIC ACID/K 18-600-40
2000 CAPSULE ORAL
COMMUNITY

## 2024-02-19 RX ORDER — NAPROXEN 500 MG/1
500 TABLET ORAL 2 TIMES DAILY PRN
Qty: 180 TABLET | Refills: 1 | Status: SHIPPED | OUTPATIENT
Start: 2024-02-19

## 2024-02-19 NOTE — PROGRESS NOTES
[facility] Note  02/19/24    Chief complaint:   Chief Complaint   Patient presents with    Knee Pain     And swelling. Left knee.     HPI:   Vanessa Gonzalez is a 33 year old female who presents for left knee pain and swelling.    Has had knee pain intermittently for years.     December 19, 2023 both knees were painful, left worse than the right. Was resting, icing, elevating, compression. Would improve. Then pain would recur a month later. Will take about a week to calm down but not be completely better.    Will limp when it flares.    Would wear knee sleeves intermittently for pain. No redness, just puffiness in the knees.    Wears compression stockings.    No prior knee injury.    Tried aleve for the pain, sometimes takes the edge off but sometimes does not work very well.    When sitting feels better. Pain today 7/10 when moving around. Now after sitting 5/10.    Denies knee cracking, catching. Rare knee popping sensation. Years ago left knee locked up, it has been a long time.    Wt Readings from Last 6 Encounters:   02/19/24 132 lb 3.2 oz (60 kg)   11/09/23 127 lb 12.8 oz (58 kg)   10/27/23 127 lb 13.6 oz (58 kg)   10/27/23 127 lb 13.9 oz (58 kg)   06/16/23 130 lb 6.4 oz (59.1 kg)   04/21/23 126 lb 12.8 oz (57.5 kg)       Past Medical History:   Diagnosis Date    Abdominal pain 06/26/2022    All over pain, now localized to upper right/mid left    Anxiety 03/2020    Diagnosed with anxiety    Arthritis 03/2020    Arthritic changes to upper back    Back pain 07/12/2017    L5/S1 bulging disc, annular tear, fibromyalgia    Back problem 07/12/2018    L5 BULGING DISC    Belching 06/26/2022    I noticed more frequent burping but it could also just be me    Bleeding nose 2021    Bleeding gums only brushing, improved with dental cleanings    Bloating     I always feel like I'm bloated    Blood in the stool 04/2022    Enough blood to fill toilet or just when wiping    Blood in  urine 5/2021, 6/29/22    Not visible blood, microscopic, no infection    Body piercing 01/04/2021    double pierced ear lobes, daith piercing fell out recently    Change in hair     Constipation 07/2022    After a lot of diarrhea, constipation became more frequent    Diarrhea, unspecified 06/26/2022    Every hour for whole day, got better but still frequent    Dizziness 08/14/2022    Slight dizziness when i was nauseous. Had to lay down    Fatigue 2018    I have fibromyalgia so i am always tired    Fever 06/26/2022    I had lowgrade 99 fever with body aches but not since then    Fibromyalgia     Frequent urination 05/2021    I was peeing every hour. Seems to be better now    Frequent use of laxatives     Headache disorder 2018    I have migraines once a month for a week at a time    Heartburn     Occassional heart burn, not severe    Hemorrhoids 05/2022    Prinary care confirmed past hemrrhoids with anal exam    History of cardiac murmur 04/2022    Sinus arrhythmia, normal    History of depression 03/2020    Diagnosed with depression    Indigestion 06/26/2022    I couldn't eat anything without diarrhea    Irregular bowel habits 06/26/2022    Elaina always have irregular habits but worse now    Itch of skin 01/04/2020    Chronic idiopathic urticaria    Leaking of urine 05/2021    I will pee and then leak even after i am done going    Leg swelling 11/2021    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 Stabbing pain left mid abdomen    Painful urination 05/2021    There were times i had to push when i pee    Sleep disturbance 08/2022    I  had 2-3 weeks of insomnia, now i am sleeping normally    Stress     Uncomfortable fullness after meals     Doesn't always happen, not too much anymore    Visual impairment     GLASSES    Wears glasses 2000    I've had glasses since i was a child, nearsighted    Weight gain 02/2022    i was always hungry and would feel weak if i didn't eat    Weight loss 07/08/2022    I lost a few lbs after being sick     Past Surgical History:   Procedure Laterality Date    COLONOSCOPY  01/01/2023    REMOVAL OF OVARIAN CYST(S)  01/2019     Allergies   Allergen Reactions    Cat Hair Extract HIVES, ITCHING and Runny nose    Duloxetine Hcl ANXIETY    Gabapentin ANXIETY, FATIGUE and OTHER (SEE COMMENTS)    Peanuts ITCHING    Diclofenac DIARRHEA and OTHER (SEE COMMENTS)    Diclofenac Sodium DIARRHEA and OTHER (SEE COMMENTS)    Lyrica INSOMNIA    Nickel RASH    Nickel RASH    Pregabalin INSOMNIA    Seasonal Runny nose and OTHER (SEE COMMENTS)     Sneezing, watery eyes      Cholecalciferol (VITAMIN D) 50 MCG (2000 UT) Oral Cap Take 1 capsule (2,000 Units total) by mouth.      naproxen 500 MG Oral Tab Take 1 tablet (500 mg total) by mouth 2 (two) times daily as needed (take with food and big glass of water). 180 tablet 1    TRULANCE 3 MG Oral Tab Take 1 tablet by mouth daily. 30 tablet 5    fexofenadine 180 MG Oral Tab Take 1 tablet (180 mg total) by mouth daily.      cetirizine 10 MG Oral Tab Take 1 tablet (10 mg total) by mouth daily.      diphenhydrAMINE HCl (BENADRYL ALLERGY OR) Take by mouth.       Social History     Socioeconomic History    Marital status:    Tobacco Use    Smoking status: Never     Passive exposure: Current    Smokeless tobacco: Never    Tobacco comments:     Denied tobacco use   Vaping Use    Vaping Use: Never used   Substance and Sexual Activity    Alcohol use: Never     Comment: Rare     Drug use: Never    Sexual activity: Yes     Partners: Male     Birth control/protection: Condom   Other Topics Concern     Caffeine Concern No    Exercise No     Counseling given: Not Answered  Tobacco comments: Denied tobacco use    Family History   Problem Relation Age of Onset    Allergies Father     Other (Parkinson's disease) Father     Hypertension Mother     Allergies Mother     Colon Cancer Maternal Grandmother         She had colon cancer that spread to other organs    Heart Attack Maternal Grandfather          of a heart attack, smoker    Osteoporosis Paternal Grandmother     Bipolar Disorder Sister     Psoriasis Sister     Substance Abuse Brother     Diabetes Maternal Aunt     Diabetes Paternal Uncle     Other (cancer unsure what type) Paternal Uncle      Family Status   Relation Status    Fa Alive    Mo Alive    MGMA     MGFA (Not Specified)    PGMA (Not Specified)    Sis Alive    Bro Alive    Mat Aunt (Not Specified)    Paternal Unc (Not Specified)        REVIEW OF SYSTEMS:   See HPI    EXAM:   /64 (BP Location: Right arm, Patient Position: Sitting, Cuff Size: adult)   Pulse 84   Temp 97.2 °F (36.2 °C) (Temporal)   Resp 16   Ht 5' 2\" (1.575 m)   Wt 132 lb 3.2 oz (60 kg)   LMP 2024 (Exact Date)   BMI 24.18 kg/m²  Estimated body mass index is 24.18 kg/m² as calculated from the following:    Height as of this encounter: 5' 2\" (1.575 m).    Weight as of this encounter: 132 lb 3.2 oz (60 kg).   Vital signs reviewed. Appears stated age, well groomed.  Physical Exam:  GEN:  Patient is alert and oriented x3, no apparent distress  HEAD:  Normocephalic, atraumatic  HEENT:  Eyes: EOMI, PERRLA, no scleral icterus, conjunctivae clear bilaterally.  Ears: TM's clear and visible bilaterally, no excess cerumen or erythema.  Throat:  No tonsillar erythema or exudate.  Mouth:  No oral lesions or ulcerations, no dental abnormalities noted.  LUNGS: clear to auscultation bilaterally, no rales/rhonchi/wheezing  HEART:  Regular rate and rhythm, normal S1/S2, no murmurs, rubs or gallops  ABDOMEN:  Bowel sounds normal,  soft, nondistended, nontender, no hepatosplenomegaly  EXTREMITIES:  R and L knees with tenderness of the anterior knee; small knee effusion b/l, no erythema, ROM intact BLE, negative anterior and posterior drawer b/l, negative patellar apprehension b/l, positive bebe on the left, positive thessaly on the left, negative quad activation b/l  NEURO:  CN 2 - 12 grossly intact, gait normal      ASSESSMENT AND PLAN:   1. Chronic pain of left knee  2. Pain and swelling of left knee  3. Chronic pain of both knees  Patient presents for chronic knee pain. Will check XR of both knees. Trial of naproxen 500mg BID prn, take with food and plenty of water. Will refer to physical therapy if no contraindication on imaging. To follow up if worsening symptoms or no improvement.  - Rheumatology Referral - [place] ([place]  - XR KNEE (3 VIEWS), RIGHT (CPT=73562); Future  - Physical Therapy Referral - [place] Location  - naproxen 500 MG Oral Tab; Take 1 tablet (500 mg total) by mouth 2 (two) times daily as needed (take with food and big glass of water).  Dispense: 180 tablet; Refill: 1      4. Raynaud's phenomenon without gangrene  Patient has hx of fibromyalgia, raynaud's and polyarthralgia. Previously following with rheumatology, will send new referral, appreciate evaluation and recommendations.  - Rheumatology Referral - [place] [facility]    5. Fibromyalgia  - [facility]umatology Referral - [place] [facility]    6. Polyarthralgia  - Rheumatology Referral - [place] [facility]        Meds & Refills for this Visit:  Requested Prescriptions     Signed Prescriptions Disp Refills    naproxen 500 MG Oral Tab 180 tablet 1     Sig: Take 1 tablet (500 mg total) by mouth 2 (two) times daily as needed (take with food and big glass of water).       Start Taking               naproxen 500 MG Oral Tab Take 1 tablet (500 mg total) by mouth 2 (two) times daily as needed (take with food and big glass of water).          Stop Taking                 Magnesium 100 MG Oral Tab    Take by mouth.     B Complex Vitamins (B COMPLEX 1 OR)    Take by mouth.            Health Maintenance:  Health Maintenance Due   Topic Date Due    Annual Physical  Never done    DTaP,Tdap,and Td Vaccines (1 - Tdap) Never done    COVID-19 Vaccine (4 - 2023-24 season) 09/01/2023    Influenza Vaccine (1) Never done    Annual Depression Screening  01/01/2024       Patient/Caregiver Education: There are no barriers to learning. Medical education done.   Outcome: Patient verbalizes understanding. Patient is notified to call with any questions, complications, allergies, or worsening or changing symptoms.  Patient is to call with any side effects or complications from the treatments as a result of today.     Problem List:  Patient Active Problem List   Diagnosis    Lumbago    Well woman exam with routine gynecological exam    Screen for STD (sexually transmitted disease)    Discogenic low back pain    Encounter for surveillance of contraceptive pills    Fibromyalgia    Chronic urticaria    Positive [place] (antinuclear antibody)    Polyarthralgia    Urinary retention    Urinary incontinence    Vitamin D deficiency    Raynaud's phenomenon without gangrene    History of hyperprolactinemia    Pituitary microadenoma (HCC)    Right upper quadrant pain    Diarrhea, unspecified    Chronic superficial gastritis without bleeding    Gastroesophageal reflux disease without esophagitis    Chronic heartburn    Abdominal bloating    Hematochezia    Chronic pain of left knee       Return in about 8 weeks (around 4/15/2024), or if symptoms worsen or fail to improve.    Nyasia Carbajal MD  Located within [facility]  02/19/24      Please note that portions of this note may have been completed with a voice recognition program. Efforts were made to edit the dictations but occasionally words are mis-transcribed. Thank you for your understanding.

## 2024-02-20 ENCOUNTER — MED REC SCAN ONLY (OUTPATIENT)
Dept: FAMILY MEDICINE CLINIC | Facility: CLINIC | Age: 34
End: 2024-02-20

## 2024-02-21 ENCOUNTER — HOSPITAL ENCOUNTER (OUTPATIENT)
Dept: GENERAL RADIOLOGY | Age: 34
Discharge: HOME | End: 2024-02-21
Attending: STUDENT IN AN ORGANIZED HEALTH CARE EDUCATION/TRAINING PROGRAM
Payer: COMMERCIAL

## 2024-02-21 ENCOUNTER — ANCILLARY ORDERS (OUTPATIENT)
Dept: FAMILY MEDICINE CLINIC | Facility: CLINIC | Age: 34
End: 2024-02-21

## 2024-02-21 DIAGNOSIS — G89.29 CHRONIC PAIN OF LEFT KNEE: Primary | ICD-10-CM

## 2024-02-21 DIAGNOSIS — M25.562 PAIN AND SWELLING OF LEFT KNEE: ICD-10-CM

## 2024-02-21 DIAGNOSIS — M25.562 CHRONIC PAIN OF BOTH KNEES: ICD-10-CM

## 2024-02-21 DIAGNOSIS — M25.562 CHRONIC PAIN OF LEFT KNEE: ICD-10-CM

## 2024-02-21 DIAGNOSIS — M25.561 CHRONIC PAIN OF BOTH KNEES: ICD-10-CM

## 2024-02-21 DIAGNOSIS — M25.50 POLYARTHRALGIA: ICD-10-CM

## 2024-02-21 DIAGNOSIS — G89.29 CHRONIC PAIN OF BOTH KNEES: ICD-10-CM

## 2024-02-21 DIAGNOSIS — G89.29 CHRONIC PAIN OF LEFT KNEE: ICD-10-CM

## 2024-02-21 DIAGNOSIS — M25.462 PAIN AND SWELLING OF LEFT KNEE: ICD-10-CM

## 2024-02-21 DIAGNOSIS — M25.562 CHRONIC PAIN OF LEFT KNEE: Primary | ICD-10-CM

## 2024-02-21 DIAGNOSIS — I73.00 RAYNAUD'S PHENOMENON WITHOUT GANGRENE: ICD-10-CM

## 2024-02-21 DIAGNOSIS — M79.7 FIBROMYALGIA: ICD-10-CM

## 2024-02-21 PROCEDURE — 73562 X-RAY EXAM OF KNEE 3: CPT | Performed by: STUDENT IN AN ORGANIZED HEALTH CARE EDUCATION/TRAINING PROGRAM

## 2024-02-29 ENCOUNTER — OFFICE VISIT (OUTPATIENT)
Dept: RHEUMATOLOGY | Facility: CLINIC | Age: 34
End: 2024-02-29
Payer: COMMERCIAL

## 2024-02-29 VITALS
WEIGHT: 132 LBS | BODY MASS INDEX: 24.29 KG/M2 | RESPIRATION RATE: 16 BRPM | HEART RATE: 94 BPM | SYSTOLIC BLOOD PRESSURE: 132 MMHG | TEMPERATURE: 99 F | HEIGHT: 62 IN | OXYGEN SATURATION: 99 % | DIASTOLIC BLOOD PRESSURE: 80 MMHG

## 2024-02-29 DIAGNOSIS — Z84.0 FAMILY HISTORY OF PSORIASIS IN SISTER: ICD-10-CM

## 2024-02-29 DIAGNOSIS — G89.29 CHRONIC PAIN OF BOTH KNEES: ICD-10-CM

## 2024-02-29 DIAGNOSIS — M25.562 CHRONIC PAIN OF BOTH KNEES: ICD-10-CM

## 2024-02-29 DIAGNOSIS — M25.50 ARTHRALGIA, UNSPECIFIED JOINT: ICD-10-CM

## 2024-02-29 DIAGNOSIS — L50.1 CHRONIC IDIOPATHIC URTICARIA: ICD-10-CM

## 2024-02-29 DIAGNOSIS — R76.8 POSITIVE ANA (ANTINUCLEAR ANTIBODY): Primary | ICD-10-CM

## 2024-02-29 DIAGNOSIS — M25.561 CHRONIC PAIN OF BOTH KNEES: ICD-10-CM

## 2024-02-29 DIAGNOSIS — M53.3 SACROILIAC JOINT DYSFUNCTION: ICD-10-CM

## 2024-02-29 DIAGNOSIS — M76.32 ILIOTIBIAL BAND SYNDROME OF LEFT SIDE: ICD-10-CM

## 2024-02-29 DIAGNOSIS — M79.7 FIBROMYALGIA: ICD-10-CM

## 2024-02-29 PROCEDURE — 99204 OFFICE O/P NEW MOD 45 MIN: CPT | Performed by: INTERNAL MEDICINE

## 2024-02-29 NOTE — PROGRESS NOTES
Rheumatology New Patient Note  =====================================================================================================    Date of visit: 2/29/2024  ?  Chief complaint: +ORLANDO, fibromyalgia   Chief Complaint   Patient presents with    [facility]     New pt. Dr. Carbajal referral. Diagnosed with fibro in 2020. Chronic hives started as well. Biopsy was negative. Knee pain and swelling started in December. Generally always stiff, but worse in the morning.      Referring (will send letter)  PCP  Nyasia Carbajal MD  Fax: [phone]  Phone: [phone]    =====================================================================================================  [facility]    Vanessa Gonzalez is a 33 year old female     Here for further evaluation of a positive ORLANDO in the context of left greater than right knee swelling and fibromyalgia.  -Longstanding history of chronic pain.  Eventually diagnosed with fibromyalgia by  Dr. Dong (integrative pain physician).  Improved with physical therapy  -was on amitriptyline , gabapentin/pregabalin, cymbalta.  But did not tolerate these medications at all.  -Since Sept 2023, has had left > right knee pain/swelling.  Issue occurred after a long drive to [place].  Saw PCP, obtained X-rays.  Minimal degenerative changes noted  -Previously had a positive ORLANDO at a low titer of 1: 40 with negative extractable nuclear antigens.  Saw outside rheumatologist, no systemic autoimmune disease was diagnosed.  -taking naproxen 220 mg twice daily as needed for knee pain/swelling.  Naproxen has not helped with the swelling.  But does help with the pain.  Will be starting physical therapy later in March.  -In 2022: diagnosed with IBS-c, EGD/C-scope was negative.   -Chronic urticaria: saw allergist/dermatologist, did a biopsy that showed urticaria without vasculitis. Diagnosed with CIU.     Fhx:  Maternal aunt diagnosed with APLS.  Younger sister with psoriasis     14 point ROS  negative except noted above    Medications:  Current Outpatient Medications on File Prior to Visit   Medication Sig Dispense Refill    Cholecalciferol (VITAMIN D) 50 MCG (2000 UT) Oral Cap Take 1 capsule (2,000 Units total) by mouth.      naproxen 500 MG Oral Tab Take 1 tablet (500 mg total) by mouth 2 (two) times daily as needed (take with food and big glass of water). 180 tablet 1    fexofenadine 180 MG Oral Tab Take 1 tablet (180 mg total) by mouth daily.      cetirizine 10 MG Oral Tab Take 1 tablet (10 mg total) by mouth daily.      diphenhydrAMINE HCl (BENADRYL ALLERGY OR) Take by mouth.      TRULANCE 3 MG Oral Tab Take 1 tablet by mouth daily. 30 tablet 5     No current facility-administered medications on file prior to visit.       Past Medical History:  Past Medical History:   Diagnosis Date    Abdominal pain 06/26/2022    All over pain, now localized to upper right/mid left    Anxiety 03/2020    Diagnosed with anxiety    Arthritis 03/2020    Arthritic changes to upper back    Back pain 07/12/2017    L5/S1 bulging disc, annular tear, fibromyalgia    Back problem 07/12/2018    L5 BULGING DISC    Belching 06/26/2022    I noticed more frequent burping but it could also just be me    Bleeding nose 2021    Bleeding gums only brushing, improved with dental cleanings    Bloating     I always feel like I'm bloated    Blood in the stool 04/2022    Enough blood to fill toilet or just when wiping    Blood in urine 5/2021, 6/29/22    Not visible blood, microscopic, no infection    Body piercing 01/04/2021    double pierced ear lobes, daith piercing fell out recently    Change in hair     Constipation 07/2022    After a lot of diarrhea, constipation became more frequent    Diarrhea, unspecified 06/26/2022    Every hour for whole day, got better but still frequent    Dizziness 08/14/2022    Slight dizziness when i was nauseous. Had to lay down    Fatigue 2018    I have fibromyalgia so i am always tired    Fever 06/26/2022     I had lowgrade 99 fever with body aches but not since then    Fibromyalgia     Frequent urination 05/2021    I was peeing every hour. Seems to be better now    Frequent use of laxatives     Headache disorder 2018    I have migraines once a month for a week at a time    Heartburn     Occassional heart burn, not severe    Hemorrhoids 05/2022    Prinary care confirmed past hemrrhoids with anal exam    History of cardiac murmur 04/2022    Sinus arrhythmia, normal    History of depression 03/2020    Diagnosed with depression    Indigestion 06/26/2022    I couldn't eat anything without diarrhea    Irregular bowel habits 06/26/2022    Elaina always have irregular habits but worse now    Itch of skin 01/04/2020    Chronic idiopathic urticaria    Leaking of urine 05/2021    I will pee and then leak even after i am done going    Leg swelling 11/2021    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 Stabbing pain left mid abdomen    Painful urination 05/2021    There were times i had to push when i pee    Sleep disturbance 08/2022    I had 2-3 weeks of insomnia, now i am sleeping normally    Stress     Uncomfortable fullness after meals     Doesn't always happen, not too much anymore    Visual impairment     GLASSES    Wears glasses 2000    I've had glasses since i was a child, nearsighted    Weight gain 02/2022    i was always hungry and would feel weak if i didn't eat    Weight loss 07/08/2022    I lost a few lbs after being sick     Past Surgical History:  Past Surgical History:   Procedure Laterality Date    COLONOSCOPY  01/01/2023    REMOVAL  OF OVARIAN CYST(S)  2019     Family History:  Family History   Problem Relation Age of Onset    Allergies Father     Other (Parkinson's disease) Father     Hypertension Mother     Allergies Mother     Colon Cancer Maternal Grandmother         She had colon cancer that spread to other organs    Heart Attack Maternal Grandfather          of a heart attack, smoker    Osteoporosis Paternal Grandmother     Bipolar Disorder Sister     Psoriasis Sister     Substance Abuse Brother     Diabetes Maternal Aunt     Diabetes Paternal Uncle     Other (cancer unsure what type) Paternal Uncle      Social History:  Social History     Socioeconomic History    Marital status:    Tobacco Use    Smoking status: Never     Passive exposure: Current    Smokeless tobacco: Never    Tobacco comments:     Denied tobacco use   Vaping Use    Vaping Use: Never used   Substance and Sexual Activity    Alcohol use: Never     Comment: Rare     Drug use: Never    Sexual activity: Yes     Partners: Male     Birth control/protection: Condom   Other Topics Concern    Caffeine Concern No    Exercise No     ?  Allergies:  Allergies   Allergen Reactions    Cat Hair Extract HIVES, ITCHING and Runny nose    Duloxetine Hcl ANXIETY    Gabapentin ANXIETY, FATIGUE and OTHER (SEE COMMENTS)    Peanuts ITCHING    Diclofenac DIARRHEA and OTHER (SEE COMMENTS)    Diclofenac Sodium DIARRHEA and OTHER (SEE COMMENTS)    Lyrica INSOMNIA    Nickel RASH    Nickel RASH    Pregabalin INSOMNIA    Seasonal Runny nose and OTHER (SEE COMMENTS)     Sneezing, watery eyes         Objective    Vitals:    24 1500   BP: 132/80   Pulse: 94   Resp: 16   Temp: 98.7 °F (37.1 °C)   SpO2: 99%   Weight: 132 lb (59.9 kg)   Height: 5' 2\" (1.575 m)       GEN: NAD, well-nourished.   HEENT: Head: NCAT. Face: No lesions. Eyes: Conjunctiva clear. Sclera are anicteric. PERRLA. EOMs are full. Ears: The right and left ear canals are clear.  Nose: No external or internal nasal  deformities. Nasal septum is midline. Mouth: The lips are within normal limits.  No oral ulcers Tongue is midline with no lesions. The oral cavity is clear.   Neck: Supple. No neck masses. No thyromegaly.   CV: RRR, no mrg, S1/S2  PULM: CTAB, no wrr, easy effort  Extremities: No cyanosis, edema or deformities.   Neurologic: Strength, CN2-12 grossly intact   Psych: normal affect.   Skin:     -Livedo reticularis changes overlying the bilateral lower extremities    MSK: 28 joint count performed. No evidence of synovitis in mcp, pip, dip, wrist, elbows, shoulders, hips, knees, ankles, mtp unless otherwise noted. Full ROM of elbows, wrists, knees.     No widespread allodynia  -Left knee effusion mild with a Baker's cyst, tender palpation at the IT band insertion on the lateral aspect of the left knee  -Small right knee effusion with minimal tenderness  -No other peripheral joint synovitis appreciated.    -Tender palpation of the lumbar spine and sacroiliac joints.  Tender palpation in thoracic paraspinals  ?  Labs:      2020 XR SIJ wnl    4/2021 ORLANDO extractable nuclear antigens (ENAs)  all negative, RF, CCP neg     2019: HLA b27 neg     Lab Results   Component Value Date    WBC 6.4 03/05/2022    RBC 3.95 03/05/2022    HGB 12.3 03/05/2022    HCT 36.2 03/05/2022    .0 03/05/2022    MCV 89.9 06/29/2022    MCH 31.1 03/05/2022    MCHC 35.0 06/29/2022    RDW 12.0 03/05/2022    NEPRELIM 3.79 03/05/2022    NEPERCENT 65.5 06/29/2022    LYPERCENT 24.6 06/29/2022    MOPERCENT 7.8 03/05/2022    EOPERCENT 2.2 03/05/2022    BAPERCENT 0.5 03/05/2022    NE 3.79 03/05/2022    LYMABS 1.97 03/05/2022    MOABSO 0.50 03/05/2022    EOABSO 0.14 03/05/2022    BAABSO 0.03 03/05/2022     Lab Results   Component Value Date    GLU 83 03/05/2022    BUN 14 03/05/2022    CREATSERUM 0.73 03/05/2022    ANIONGAP 3 03/05/2022    GFRNAA 131 06/29/2022    GFRAA 151 06/29/2022    CA 8.9 03/05/2022    OSMOCALC 290 03/05/2022    ALKPHO 45 03/05/2022     AST 19 03/05/2022    ALT 33 03/05/2022    BILT 0.5 03/05/2022    TP 7.1 03/05/2022    ALB 4.0 03/05/2022    GLOBULIN 3.1 03/05/2022     03/05/2022    K 4.2 03/05/2022     03/05/2022    CO2 26.0 03/05/2022         Lab Results   Component Value Date    ANAS Negative 04/10/2021     Lab Results   Component Value Date    ANTISSA <0.2 04/20/2021    ANTISSB <0.2 04/20/2021     Lab Results   Component Value Date    ANTIDSDNA <1 04/20/2021    ANTISM <0.2 04/20/2021     Lab Results   Component Value Date    TFYXQLC03 <0.2 04/20/2021     No results found for: \"C3\", \"C4\"  No results found for: \"DRVVT\", \"LAINT\", \"PTTLUPUS\", \"LUPUSINTERP\", \"LA\", \"M2ZO8PVJFP\", \"M9TI4ZBCEK\", \"F1STWCLIKQ\", \"D9KLTRCLWN\"  No results found for: \"CARDIOLIPIGG\", \"CARDIOLIPIGM\", \"CARDIOLIPIGA\", \"CARDIOIGA\", \"CARLIP\"      Additional Labs:    Radiology:    Radiology review:  XR KNEE (3 VIEWS), RIGHT (CPT=73562)    Result Date: 2/21/2024  CONCLUSION:  See above.   LOCATION:  [facility]   Dictated by (CST): Robert Gibson MD on 2/21/2024 at 4:58 PM     Finalized by (CST): Robert Gibson MD on 2/21/2024 at 4:59 PM       XR KNEE (3 VIEWS), LEFT (CPT=73562)    Result Date: 2/21/2024  CONCLUSION:  See above.   LOCATION:  [facility]   Dictated by (CST): Robert Gibson MD on 2/21/2024 at 4:58 PM     Finalized by (CST): Robert Gibson MD on 2/21/2024 at 4:58 PM      =====================================================================================================  Assessment and Plan    Assessment:  1. Positive ORLANDO (antinuclear antibody)    2. Arthralgia, unspecified joint    3. Fibromyalgia    4. Sacroiliac joint dysfunction    5. Chronic pain of both knees    6. Iliotibial band syndrome of left side    7. Family history of psoriasis in sister    8. Chronic idiopathic urticaria      Left knee effusion with popliteal cyst and evidence of active IT band syndrome.  Suspect mechanical in nature, however given family history of psoriasis in sister, cannot completely rule out  psoriatic arthritis sine psoriasis.    #Positive ORLANDO: Livedo reticularis noted on examination.  Paternal aunt with antiphospholipid syndrome.  Otherwise no clear features that raises suspicion for systemic autoimmune disease such as SLE.    #Sacroiliac joint dysfunction: Negative SI joint x-rays in 2020  #Fibromyalgia: Long history.  Improved with physical therapy in the past.  Not active today  -previously on amitriptyline , gabapentin/pregabalin, cymbalta.  But did not tolerate these medications at all.  #IBS-C: Negative EGD/colonoscopy in the past  #Chronic idiopathic urticaria: Biopsy of urticarial lesions negative for urticarial vasculitis in the past  ?  Plan:  -MRI of the sacroiliac joints to evaluate for nonradiographic axial spondylarthritis given chronic inflammatory low back pain and sacroiliac joint dysfunction.  Negative x-ray of the SI joints  -Repeat positive ORLANDO workup with extractable nuclear antigens, RF/CCP, antiphospholipid antibodies, inflammatory markers  -Labs to further adjudicate chronic pain/fibromyalgia and/or high risk medication use baseline labs: Vitamin D, vitamin B12, ferritin/iron studies, TSH  -Continue naproxen 220 mg twice daily.  Has prescription 500 mg daily as needed for worsening pain.  Has physical therapy scheduled for March for knees.  Patient will let me know if no improvement with physical therapy.  Can consider corticosteroid injections.    Rtc after the above    Diagnoses and all orders for this visit:    Positive ORLANDO (antinuclear antibody)  -     Connective Tissue Disease (ORLANDO) Screen, Reflex Specific Antibody; Future  -     Cyclic Citrullinate Pep. IGG; Future  -     Rheumatoid Arthritis Factor; Future  -     Anticardiolipin Ab, IGG, Qn; Future  -     Anticardiolipin AB, IGM, Qn; Future  -     Beta 2 Glycoprotein I AB, G/M; Future  -     Lupus Anticoagulant Comp; Future  -     Comp Metabolic Panel (14); Future  -     CBC With Differential With Platelet; Future  -      C-Reactive Protein; Future  -     Sed Rate, Westergren (Automated); Future  -     TSH W Reflex To Free T4; Future  -     Iron And Tibc; Future  -     Ferritin; Future  -     B12 AND FOLATE; Future  -     Vitamin D; Future    Arthralgia, unspecified joint  -     Connective Tissue Disease (OR[place]O) Screen, Reflex Specific Antibody; Future  -     Cyclic Citrullinate Pep. IGG; Future  -     Rheumatoid Arthritis Factor; Future  -     Anticardiolipin Ab, IGG, Qn; Future  -     Anticardiolipin AB, IGM, Qn; Future  -     Beta 2 Glycoprotein I AB, G/M; Future  -     Lupus Anticoagulant Comp; Future  -     Comp Metabolic Panel (14); Future  -     CBC With Differential With Platelet; Future  -     C-Reactive Protein; Future  -     Sed Rate, Westergren (Automated); Future  -     TSH W Reflex To Free T4; Future  -     Iron And Tibc; Future  -     Ferritin; Future  -     B12 AND FOLATE; Future  -     Vitamin D; Future    Fibromyalgia  -     Connective Tissue Disease (ORLANDO) Screen, Reflex Specific Antibody; Future  -     Cyclic Citrullinate Pep. IGG; Future  -     Rheumatoid Arthritis Factor; Future  -     Anticardiolipin Ab, IGG, Qn; Future  -     Anticardiolipin AB, IGM, Qn; Future  -     Beta 2 Glycoprotein I AB, G/M; Future  -     Lupus Anticoagulant Comp; Future  -     Comp Metabolic Panel (14); Future  -     CBC With Differential With Platelet; Future  -     C-Reactive Protein; Future  -     Sed Rate, Westergren (Automated); Future  -     TSH W Reflex To Free T4; Future  -     Iron And Tibc; Future  -     Ferritin; Future  -     B12 AND FOLATE; Future  -     Vitamin D; Future    Sacroiliac joint dysfunction  -     MRI SI JOINTS(CPT=72195); Future    Chronic pain of both knees    Iliotibial band syndrome of left side    Family history of psoriasis in sister    Chronic idiopathic urticaria        No follow-ups on file.      The above plan of care, diagnosis, orders, and follow-up were discussed with the patient. Questions related  to this recommended plan of care were answered.    Thank you for referring this delightful patient to me. Please feel free to contact me with any questions.     This report was performed utilizing speech recognition software technology. Despite proofreading, speech recognition errors could escape detection. If a word or phrase is confusing or out of context, please do not hesitate to call for   clarification.       Kind regards      Archie Medina MD  [facility]

## 2024-03-01 ENCOUNTER — LAB ENCOUNTER (OUTPATIENT)
Dept: LAB | Age: 34
End: 2024-03-01
Attending: INTERNAL MEDICINE
Payer: COMMERCIAL

## 2024-03-01 DIAGNOSIS — R76.8 POSITIVE ANA (ANTINUCLEAR ANTIBODY): ICD-10-CM

## 2024-03-01 DIAGNOSIS — M25.50 ARTHRALGIA, UNSPECIFIED JOINT: ICD-10-CM

## 2024-03-01 DIAGNOSIS — M79.7 FIBROMYALGIA: ICD-10-CM

## 2024-03-01 LAB
ALBUMIN SERPL-MCNC: 4 G/DL (ref 3.4–5)
ALBUMIN/GLOB SERPL: 1.3 {RATIO} (ref 1–2)
ALP LIVER SERPL-CCNC: 50 U/L
ALT SERPL-CCNC: 21 U/L
ANION GAP SERPL CALC-SCNC: 3 MMOL/L (ref 0–18)
AST SERPL-CCNC: 16 U/L (ref 15–37)
BASOPHILS # BLD AUTO: 0.02 X10(3) UL (ref 0–0.2)
BASOPHILS NFR BLD AUTO: 0.3 %
BILIRUB SERPL-MCNC: 0.3 MG/DL (ref 0.1–2)
BUN BLD-MCNC: 9 MG/DL (ref 9–23)
CALCIUM BLD-MCNC: 8.9 MG/DL (ref 8.5–10.1)
CHLORIDE SERPL-SCNC: 110 MMOL/L (ref 98–112)
CO2 SERPL-SCNC: 27 MMOL/L (ref 21–32)
CREAT BLD-MCNC: 0.77 MG/DL
CRP SERPL-MCNC: <0.29 MG/DL (ref ?–0.3)
DEPRECATED HBV CORE AB SER IA-ACNC: 15.3 NG/ML
EGFRCR SERPLBLD CKD-EPI 2021: 106 ML/MIN/1.73M2 (ref 60–?)
EOSINOPHIL # BLD AUTO: 0.12 X10(3) UL (ref 0–0.7)
EOSINOPHIL NFR BLD AUTO: 1.8 %
ERYTHROCYTE [DISTWIDTH] IN BLOOD BY AUTOMATED COUNT: 11.5 %
ERYTHROCYTE [SEDIMENTATION RATE] IN BLOOD: 6 MM/HR
FASTING STATUS PATIENT QL REPORTED: NO
FOLATE SERPL-MCNC: 32.3 NG/ML (ref 8.7–?)
GLOBULIN PLAS-MCNC: 3.1 G/DL (ref 2.8–4.4)
GLUCOSE BLD-MCNC: 90 MG/DL (ref 70–99)
HCT VFR BLD AUTO: 33.2 %
HGB BLD-MCNC: 11.3 G/DL
IMM GRANULOCYTES # BLD AUTO: 0.01 X10(3) UL (ref 0–1)
IMM GRANULOCYTES NFR BLD: 0.2 %
IRON SATN MFR SERPL: 19 %
IRON SERPL-MCNC: 55 UG/DL
LYMPHOCYTES # BLD AUTO: 2.13 X10(3) UL (ref 1–4)
LYMPHOCYTES NFR BLD AUTO: 32 %
MCH RBC QN AUTO: 30.9 PG (ref 26–34)
MCHC RBC AUTO-ENTMCNC: 34 G/DL (ref 31–37)
MCV RBC AUTO: 90.7 FL
MONOCYTES # BLD AUTO: 0.58 X10(3) UL (ref 0.1–1)
MONOCYTES NFR BLD AUTO: 8.7 %
NEUTROPHILS # BLD AUTO: 3.79 X10 (3) UL (ref 1.5–7.7)
NEUTROPHILS # BLD AUTO: 3.79 X10(3) UL (ref 1.5–7.7)
NEUTROPHILS NFR BLD AUTO: 57 %
OSMOLALITY SERPL CALC.SUM OF ELEC: 288 MOSM/KG (ref 275–295)
PLATELET # BLD AUTO: 225 10(3)UL (ref 150–450)
POTASSIUM SERPL-SCNC: 3.4 MMOL/L (ref 3.5–5.1)
PROT SERPL-MCNC: 7.1 G/DL (ref 6.4–8.2)
RBC # BLD AUTO: 3.66 X10(6)UL
SODIUM SERPL-SCNC: 140 MMOL/L (ref 136–145)
TIBC SERPL-MCNC: 285 UG/DL (ref 240–450)
TRANSFERRIN SERPL-MCNC: 191 MG/DL (ref 200–360)
TSI SER-ACNC: 1.55 MIU/ML (ref 0.36–3.74)
VIT B12 SERPL-MCNC: 388 PG/ML (ref 193–986)
WBC # BLD AUTO: 6.7 X10(3) UL (ref 4–11)

## 2024-03-01 PROCEDURE — 86431 RHEUMATOID FACTOR QUANT: CPT

## 2024-03-01 PROCEDURE — 80053 COMPREHEN METABOLIC PANEL: CPT

## 2024-03-01 PROCEDURE — 85732 THROMBOPLASTIN TIME PARTIAL: CPT

## 2024-03-01 PROCEDURE — 82607 VITAMIN B-12: CPT

## 2024-03-01 PROCEDURE — 86147 CARDIOLIPIN ANTIBODY EA IG: CPT

## 2024-03-01 PROCEDURE — 36415 COLL VENOUS BLD VENIPUNCTURE: CPT

## 2024-03-01 PROCEDURE — 86200 CCP ANTIBODY: CPT

## 2024-03-01 PROCEDURE — 83550 IRON BINDING TEST: CPT

## 2024-03-01 PROCEDURE — 82728 ASSAY OF FERRITIN: CPT

## 2024-03-01 PROCEDURE — 85025 COMPLETE CBC W/AUTO DIFF WBC: CPT

## 2024-03-01 PROCEDURE — 86146 BETA-2 GLYCOPROTEIN ANTIBODY: CPT

## 2024-03-01 PROCEDURE — 86225 DNA ANTIBODY NATIVE: CPT

## 2024-03-01 PROCEDURE — 86038 ANTINUCLEAR ANTIBODIES: CPT

## 2024-03-01 PROCEDURE — 85610 PROTHROMBIN TIME: CPT

## 2024-03-01 PROCEDURE — 85705 THROMBOPLASTIN INHIBITION: CPT

## 2024-03-01 PROCEDURE — 85652 RBC SED RATE AUTOMATED: CPT

## 2024-03-01 PROCEDURE — 83540 ASSAY OF IRON: CPT

## 2024-03-01 PROCEDURE — 82306 VITAMIN D 25 HYDROXY: CPT

## 2024-03-01 PROCEDURE — 84443 ASSAY THYROID STIM HORMONE: CPT

## 2024-03-01 PROCEDURE — 86140 C-REACTIVE PROTEIN: CPT

## 2024-03-01 PROCEDURE — 85613 RUSSELL VIPER VENOM DILUTED: CPT

## 2024-03-01 PROCEDURE — 82746 ASSAY OF FOLIC ACID SERUM: CPT

## 2024-03-02 ENCOUNTER — TELEPHONE (OUTPATIENT)
Dept: RHEUMATOLOGY | Facility: CLINIC | Age: 34
End: 2024-03-02

## 2024-03-02 DIAGNOSIS — R79.0 LOW FERRITIN: Primary | ICD-10-CM

## 2024-03-02 LAB
RHEUMATOID FACT SERPL-ACNC: <10 IU/ML (ref ?–15)
VIT D+METAB SERPL-MCNC: 31.1 NG/ML (ref 30–100)

## 2024-03-02 RX ORDER — FERROUS SULFATE 325(65) MG
325 TABLET ORAL
Qty: 90 TABLET | Refills: 1 | Status: SHIPPED | OUTPATIENT
Start: 2024-03-02

## 2024-03-04 ENCOUNTER — TELEPHONE (OUTPATIENT)
Dept: RHEUMATOLOGY | Facility: CLINIC | Age: 34
End: 2024-03-04

## 2024-03-04 LAB
B2 GLYCOPROT1 IGG SERPL IA-ACNC: 0.8 U/ML (ref ?–7)
B2 GLYCOPROT1 IGM SERPL IA-ACNC: <2.4 U/ML (ref ?–7)
CARDIOLIPIN IGG SERPL-ACNC: 1.2 GPL (ref ?–10)
CARDIOLIPIN IGM SERPL-ACNC: 5.6 MPL (ref ?–10)
CCP IGG SERPL-ACNC: 1.2 U/ML (ref 0–6.9)
DSDNA IGG SERPL IA-ACNC: 0.7 IU/ML
ENA AB SER QL IA: 0.1 UG/L
ENA AB SER QL IA: NEGATIVE

## 2024-03-08 LAB
APTT PPP: 29.8 SECONDS (ref 23.3–35.6)
INR BLD: 1.02 (ref 0.85–1.16)
LA 3 SCREEN W REFLEX-IMP: NEGATIVE
PROTHROMBIN TIME: 13.4 SECONDS (ref 11.6–14.8)
SCREEN DRVVT: 1.09 S (ref 0–1.29)
SCREEN DRVVT: NEGATIVE S
STACLOT LA DELTA: 4 SECONDS (ref ?–8)

## 2024-03-26 ENCOUNTER — TELEPHONE (OUTPATIENT)
Dept: PHYSICAL THERAPY | Facility: HOSPITAL | Age: 34
End: 2024-03-26

## 2024-03-27 ENCOUNTER — OFFICE VISIT (OUTPATIENT)
Dept: PHYSICAL THERAPY | Facility: HOSPITAL | Age: 34
End: 2024-03-27
Attending: STUDENT IN AN ORGANIZED HEALTH CARE EDUCATION/TRAINING PROGRAM
Payer: COMMERCIAL

## 2024-03-27 DIAGNOSIS — M25.562 CHRONIC PAIN OF BOTH KNEES: ICD-10-CM

## 2024-03-27 DIAGNOSIS — G89.29 CHRONIC PAIN OF BOTH KNEES: ICD-10-CM

## 2024-03-27 DIAGNOSIS — M25.562 CHRONIC PAIN OF LEFT KNEE: Primary | ICD-10-CM

## 2024-03-27 DIAGNOSIS — M25.462 PAIN AND SWELLING OF LEFT KNEE: ICD-10-CM

## 2024-03-27 DIAGNOSIS — M25.561 CHRONIC PAIN OF BOTH KNEES: ICD-10-CM

## 2024-03-27 DIAGNOSIS — G89.29 CHRONIC PAIN OF LEFT KNEE: Primary | ICD-10-CM

## 2024-03-27 DIAGNOSIS — M25.562 PAIN AND SWELLING OF LEFT KNEE: ICD-10-CM

## 2024-03-27 PROCEDURE — 97161 PT EVAL LOW COMPLEX 20 MIN: CPT

## 2024-03-27 PROCEDURE — 97110 THERAPEUTIC EXERCISES: CPT

## 2024-03-29 ENCOUNTER — PATIENT MESSAGE (OUTPATIENT)
Dept: FAMILY MEDICINE CLINIC | Facility: CLINIC | Age: 34
End: 2024-03-29

## 2024-03-29 NOTE — TELEPHONE ENCOUNTER
I received a phone call from the oncologist Dr. Edith Phillips and he stated that he has discussed with radiation oncology Dr. Dereck Betts and they both recommend that patient be started on dexamethasone 10 mg IV now and then dexamethasone 6 mg every 4 hours after that. They also recommended to obtain MRI of the thoracic and lumbar spine. I have placed orders according to above discussion. Yes I can write a letter. Sent to [employer].

## 2024-03-29 NOTE — TELEPHONE ENCOUNTER
From: Vanessa Gonzalez  To: Nyasia PALLAVICyndee Carbajal  Sent: 3/29/2024 9:51 AM CDT  Subject: Doctor's note for parking    Good morning,  I wanted to ask if it would be possible to receive a doctor's note for my knees since I have started physical therapy? The only parking by the door is their visitor's parking, and my boss said I could get a doctor's note for [employer] so I can park there; otherwise, the parking lot is very long, and all spots are finders-keepers. I think it would be helpful for bad days. I have been doing ok, but this past week has been a little rough.     If I am able to receive one, I would greatly appreciate it. Thank you for your help! I started PT on Wednesday, so I'm hoping things will improve soon!

## 2024-04-02 NOTE — PROGRESS NOTES
LOWER EXTREMITY EVALUATION:     Diagnosis:   L chronic  knee pain; B chronic knee pain      Referring Provider: Nyasia Carbajal  Date of Evaluation:    3/27/2024    Precautions:  None Next MD visit:   none scheduled  Date of Surgery: n/a     PATIENT SUMMARY   Vanessa Gonzalez is a 33 year old female who presents to therapy today with complaints of B chronic knee pain, L > R with h/o RA and baker's cyst. Pt. States she has had pain on/off in B knees for years but most recently in December pain in B knees has not improved. States both knees swell up on her. Has been wearing compression sleeves. States pain at rest 2/10 that increases to 8/10 with walking and stairs. Pt. States max walking tolerance of 30 minutes. Xray: revealed mild jt. Space narrowing. Pt. Works desk job, does fine with sitting.   Pt describes pain level current 5/10, at best 2/10, at worst 8/10.   Current functional limitations include Pain with walking, stairs (down > up), unable to kneel or squat.     Vanessa describes prior level of function years of B knee pain with h/o RA, no regular exercise. Pt goals include decrease pain and swelling in B knees.  Past medical history was reviewed with Vanessa. Significant findings include  has a past medical history of Abdominal pain (06/26/2022), Anxiety (03/2020), Arthritis (03/2020), Back pain (07/12/2017), Back problem (07/12/2018), Belching (06/26/2022), Bleeding nose (2021), Bloating, Blood in the stool (04/2022), Blood in urine (5/2021, 6/29/22), Body piercing (01/04/2021), Change in hair, Constipation (07/2022), Diarrhea, unspecified (06/26/2022), Dizziness (08/14/2022), Fatigue (2018), Fever (06/26/2022), Fibromyalgia, Frequent urination (05/2021), Frequent use of laxatives, Headache disorder (2018), Heartburn, Hemorrhoids (05/2022), History of cardiac murmur (04/2022), History of depression (03/2020), Indigestion (06/26/2022), Irregular bowel habits (06/26/2022), Itch of skin  (01/04/2020), Leaking of urine (05/2021), Leg swelling (11/2021), Loss of appetite (06/26/2022), Menses painful, Migraines, Nausea (6/29/22 and 8/14/22), Ovarian cyst, Pain in joints (2020), Pain with bowel movements (6/26/22, 8/10/22), Painful urination (05/2021), Sleep disturbance (08/2022), Stress, Uncomfortable fullness after meals, Visual impairment, Wears glasses (2000), Weight gain (02/2022), and Weight loss (07/08/2022).      ASSESSMENT  Vanessa presents to physical therapy evaluation with primary c/o B chronic knee pain L > R. The results of the objective tests and measures show painful B knee ROM, swelling, B LE weakness, mild antalgic gait, (+) lateral patellar tracking, (-) B Thessaly test, (-) B Ashley test, (+) pain, tenderness, swelling suggestive of bakers cyst, and impaired B LE flexibility. Functional deficits include but are not limited to pain with bending knees, walking, negotiating stairs, kneeling, and squatting.  Signs and symptoms are consistent with diagnosis of B knee degenerative changes. Pt and PT discussed evaluation findings, pathology, POC and HEP.  Pt voiced understanding and performs HEP correctly without reported pain. Skilled Physical Therapy is medically necessary to address the above impairments and reach functional goals.     OBJECTIVE:   Observation: wearing B compression sleeves. Swelling present L knee  Palpation: tenderness L > R knee at medial joint line, lateral joint line and posterior knee. Possible baker's cyst L knee  Sensation: intact to light touch  Edema: R joint like: 38cm, L joint line 38.5cm    AROM: (* denotes performed with pain)  Hip Knee Foot/Ankle   WFL Flexion: R 130*; L 130*  Extension: R 0; L 0    WFL     Accessory motion: lateral patellar tracking B     Flexibility:  Hip Flexor: R mod, L mod  Hamstrings: R lacking 60 deg; L lacking 70 deg  Piriformis: R major; L major  ITB: R major; L major  Quads: R mod; L mod  Gastroc-soleus: R mod; L  mod    Strength/MMT: (* denotes performed with pain)  Hip Knee Foot/Ankle   Flexion: R 4/5; L 4/5  Extension: R 4-/5; L 4-/5  Abduction: R 4/5; L 4/5  ER: R 4/5; L 4/5  IR: R 4/5; L 4/5 Flexion: R 4/5; L 4/5  Extension: R 4-/5; L 4-/5    DF: R 5/5; L 5/5  PF: R 5/5; L 5/5  INV: R 5/5; L 5/5  EV: R 5/5; L 5/5  Great toe ext: R 5/5; L 5/5     Special tests:   (-) B Valentin test, (-) B thessaly, (+) B lateral patellar tracking    Gait: pt ambulates on level ground with antalgia, decreased step length L, decreased stance phase L, and decreased hip/knee flex or ext L  Gait speed 1.8mph; Step length L 52cm, R 53cm; % of time on R and L 50%  Balance: SLS: R 30 sec, L 30 sec    Today’s Treatment and Response:   Pt education was provided on exam findings, treatment diagnosis, treatment plan, expectations, and prognosis. Pt was also provided recommendations for activity modifications, possible soreness after evaluation, modalities as needed [ice/heat], pain science education , importance of remaining active, and shoe wear.  Patient was instructed in and issued a HEP for: HS stretch with strap, ITB stretch with strap, ice    Charges: PT Eval Low Complexity,         Total Timed Treatment: 10 min     Total Treatment Time: 45 min     Based on clinical rationale and outcome measures, this evaluation involved Low Complexity decision making  PLAN OF CARE:    Goals: (to be met in 8 visits)      Improve R/L LE strength by ½ grade to allow pt. to negotiate stairs with more ease  Decrease symptoms by 50% to allow pt. to tolerate walking for over 30 minutes to be able to go grocery shopping and run errands with more ease.  Improve B LE HS flexibility by 10 degrees to decrease stress on B knees and lumbar/pelvic region.  Pt. To be able to negotiate stairs with max 2/10 pain in B knees.   Pt. to be independent with HEP, joint protection principles, improved posture and body mechanics.      Frequency / Duration: Patient will be seen for 2  x/week or a total of 8 visits over a 90 day period. Treatment will include: Gait training, Manual Therapy, Neuromuscular Re-education, Therapeutic Activities, Therapeutic Exercise, Home Exercise Program instruction, and Modalities to include: Electrical stimulation (unattended) and heat, ice, and taping    Education or treatment limitation: None  Rehab Potential:good    LEFS Score  LEFS Score: 57.5 % (3/20/2024  9:00 AM)    Patient/Family/Caregiver was advised of these findings, precautions, and treatment options and has agreed to actively participate in planning and for this course of care.    Thank you for your referral. Please co-sign or sign and return this letter via fax as soon as possible to [phone]. If you have any questions, please contact me at Dept: [phone]    Sincerely,  Electronically signed by therapist: Jazmin Lafleur PT  Physician's certification required: Yes  I certify the need for these services furnished under this plan of treatment and while under my care.    X___________________________________________________ Date____________________    Certification From: 3/27/2024  To:6/26/2024

## 2024-04-03 ENCOUNTER — OFFICE VISIT (OUTPATIENT)
Dept: PHYSICAL THERAPY | Facility: HOSPITAL | Age: 34
End: 2024-04-03
Attending: STUDENT IN AN ORGANIZED HEALTH CARE EDUCATION/TRAINING PROGRAM
Payer: COMMERCIAL

## 2024-04-03 PROCEDURE — 97140 MANUAL THERAPY 1/> REGIONS: CPT

## 2024-04-03 PROCEDURE — 97110 THERAPEUTIC EXERCISES: CPT

## 2024-04-03 NOTE — PROGRESS NOTES
Diagnosis:      L chronic  knee pain; B chronic knee pain     Referring Provider: Nyasia Carbajal  Date of Evaluation:    3/27/2024    Precautions:  None Next MD visit:   none scheduled  Date of Surgery: n/a   Insurance Primary/Secondary: [employer] / N/A     # Auth Visits: 8 per POC cign[employer]            Subjective: Pt reports continued B knee pain. She states the pain is not too bad because she took Excedrin. She reports a family hx of arthritis. She states her back is a bit inflamed from [facility].     Pain: 4/10      Objective:       Strength/MMT: (* denotes performed with pain)  Hip Knee Foot/Ankle   Flexion: R 4/5; L 4/5  Extension: R 4-/5; L 4-/5  Abduction: R 4/5; L 4/5  ER: R 4/5; L 4/5  IR: R 4/5; L 4/5 Flexion: R 4/5; L 4/5  Extension: R 4-/5; L 4-/5    DF: R 5/5; L 5/5  PF: R 5/5; L 5/5  INV: R 5/5; L 5/5  EV: R 5/5; L 5/5  Great toe ext: R 5/5; L 5/5       Assessment: Progressing global hip strength to reduce valgus forces on B knees. B hip adductors and hip flexors were tight, but improved mobility noted following stretching. She appears to lack TKE in supine, so focused manual on this. Incorporated lumbar extension exercises per reports of LBP and sitting all day at work. Good tolerance to session.       Goals:   (to met in 8 visits)  Improve R/L LE strength by ½ grade to allow pt. to negotiate stairs with more ease  Decrease symptoms by 50% to allow pt. to tolerate walking for over 30 minutes to be able to go grocery shopping and run errands with more ease.  Improve B LE HS flexibility by 10 degrees to decrease stress on B knees and lumbar/pelvic region.  Pt. To be able to negotiate stairs with max 2/10 pain in B knees.   Pt. to be independent with HEP, joint protection principles, improved posture and body mechanics.    Plan: progress knee flexion mobility and global hip strength.  Date: 4/3/2024  TX#: 2/8 Date:                 TX#: 3/ Date:                 TX#: 4/ Date:                 TX#: 5/ Date:   Tx#:  6/   Therex:  2x30\" hamstring stretch on step  10x AMANDA with dowel jennifer  2x10 clam shells  2x10 SL hip abd  2x10 bridges       ManuaL:  Superior patellar glides to improve knee extension, OP at tib and femur into extension, lateral quad release, B ham and adductor stretch (2x30\")  X15 min       Therex:  15x SLR in hooklying  10x prone press ups  10x5\" DKTC stretch       NR-ed:  2x10\" SLS       HEP: HS stretch with strap, ITB stretch with strap, ice     Charges: manuaL;1, therex:2       Total Timed Treatment: 45 min  Total Treatment Time: 45 min

## 2024-04-04 ENCOUNTER — HOSPITAL ENCOUNTER (OUTPATIENT)
Dept: MRI IMAGING | Age: 34
Discharge: HOME | End: 2024-04-04
Attending: INTERNAL MEDICINE
Payer: COMMERCIAL

## 2024-04-04 DIAGNOSIS — M53.3 SACROILIAC JOINT DYSFUNCTION: ICD-10-CM

## 2024-04-04 PROCEDURE — 72195 MRI PELVIS W/O DYE: CPT | Performed by: INTERNAL MEDICINE

## 2024-04-08 ENCOUNTER — TELEPHONE (OUTPATIENT)
Dept: RHEUMATOLOGY | Facility: CLINIC | Age: 34
End: 2024-04-08

## 2024-04-08 DIAGNOSIS — M47.816 OSTEOARTHRITIS OF LUMBAR SPINE, UNSPECIFIED SPINAL OSTEOARTHRITIS COMPLICATION STATUS: Primary | ICD-10-CM

## 2024-04-08 NOTE — TELEPHONE ENCOUNTER
SI JOINTS:  There is no evidence of significant SI joint arthropathy, joint effusions or synovitis.     BONY STRUCTURES:      No fracture, pars defect, significant scoliosis, or osseous lesion.  There is evidence of mild to moderate degenerative disc disease at L4-5 and L5-S1 with disc bulging at both levels and evidence of an annular tear along the  posterior bulging disc at L5-S1.       INTRAPELVIC STRUCTURES:  Normal appearance of the uterus.  Multiple bilateral physiologic ovarian follicles including bilateral dominant follicles, the largest of which is seen on the left measuring 2.1 cm.  Mild physiologic free fluid in the posterior  cul-de-sac.                    Impression  CONCLUSION:    1. The SI joints appear within normal limits.  No significant arthropathy, joint effusions or evidence of sacroiliitis.  2. Mild to moderate disc disease at L4-5 and L5-S1 suspected with mild annular tear at L5-S1.  3. Multiple prominent bilateral ovarian physiologic follicles, the largest of which is seen on the left measuring 2.1 cm.  Mild physiologic free fluid in the posterior cul-de-sac.       SI joints normal    Moderate arthritis L4-L5 L5-S1 with possible mild tear L5-S1    Incidental ovarian follicles likely benign    If patient has significant back pain would likely benefit from physical therapy consider pain management referral but will decide course of action when Dr. Medina comes back next week    If she would like to go to physical therapy in the meantime we can put in the order    Will let DR Medina know so he is aware

## 2024-04-08 NOTE — TELEPHONE ENCOUNTER
Phoned pt and notified of test results per Dr. Borrego    \"SI joints normal     Moderate arthritis L4-L5 L5-S1 with possible mild tear L5-S1     Incidental ovarian follicles likely benign     If patient has significant back pain would likely benefit from physical therapy consider pain management referral but will decide course of action when Dr. Medina comes back next week     If she would like to go to physical therapy in the meantime we can put in the order     Will let DR Medina know so he is aware\"    Pt voices understanding, declines PT has tried multiple times. Pt is interested in pain mgmt referral at [facility]. Referral pended

## 2024-04-10 ENCOUNTER — OFFICE VISIT (OUTPATIENT)
Dept: PHYSICAL THERAPY | Facility: HOSPITAL | Age: 34
End: 2024-04-10
Attending: STUDENT IN AN ORGANIZED HEALTH CARE EDUCATION/TRAINING PROGRAM
Payer: COMMERCIAL

## 2024-04-10 PROCEDURE — 97140 MANUAL THERAPY 1/> REGIONS: CPT

## 2024-04-10 PROCEDURE — 97110 THERAPEUTIC EXERCISES: CPT

## 2024-04-10 NOTE — PROGRESS NOTES
Diagnosis:      L chronic  knee pain; B chronic knee pain     Referring Provider: Nyasia Carbajal  Date of Evaluation:    3/27/2024    Precautions:  None Next MD visit:   none scheduled  Date of Surgery: n/a   Insurance Primary/Secondary: [employer] / N/A     # Auth Visits: 8 per POC cign[employer]            Subjective: Pt. States B knee pain is improving. Maybe 1-2/10. However, had MRI last week and is still having pain in her back from the MRI. Pt. Reports h/o back pain with flare ups. Has been doing her exercises she has received in the past for her back pain currently.     Pain: 1-2/10      Objective: Refer to flow sheet. Progressed knee strengthening as tolerated.       Strength/MMT: (* denotes performed with pain)  Hip Knee Foot/Ankle   Flexion: R 4/5; L 4/5  Extension: R 4-/5; L 4-/5  Abduction: R 4/5; L 4/5  ER: R 4/5; L 4/5  IR: R 4/5; L 4/5 Flexion: R 4/5; L 4/5  Extension: R 4-/5; L 4-/5    DF: R 5/5; L 5/5  PF: R 5/5; L 5/5  INV: R 5/5; L 5/5  EV: R 5/5; L 5/5  Great toe ext: R 5/5; L 5/5       Assessment: Progressing global hip strength to reduce valgus forces on B knees. B hip adductors and hip flexors were tight, but improved mobility noted following stretching. HS and ITB flexibility improving.       Goals:   (to met in 8 visits)  Improve R/L LE strength by ½ grade to allow pt. to negotiate stairs with more ease  Decrease symptoms by 50% to allow pt. to tolerate walking for over 30 minutes to be able to go grocery shopping and run errands with more ease.  Improve B LE HS flexibility by 10 degrees to decrease stress on B knees and lumbar/pelvic region.  Pt. To be able to negotiate stairs with max 2/10 pain in B knees.   Pt. to be independent with HEP, joint protection principles, improved posture and body mechanics.    Plan: progress knee flexion mobility and global hip strength.  Date: 4/3/2024  TX#: 2/8 Date:    4/10/24             TX#: 3/8 Date:                 TX#: 4/ Date:                 TX#: 5/ Date:    Tx#: 6/   Therex:  2x30\" hamstring stretch on step  10x AMANDA with dowel jennifer  2x10 clam shells  2x10 SL hip abd  2x10 bridges TE  Scifit bike x 5 min  A/P board x 10  White board stretch 10 sec x 10  Quad stretch on 8 inch step 10 sec x 10  Sand dune  *step ups x 10  *fwd lunge x 10  *mini squat with ball squeeze x 10  *lat lunge x 10  Resisted gait with spri red lat x 2 laps      ManuaL:  Superior patellar glides to improve knee extension, OP at tib and femur into extension, lateral quad release, B ham and adductor stretch (2x30\")  X15 min MT  Superior patellar glides to improve knee extension, OP at tib and femur into extension, lateral quad release, B ham and adductor stretch (2x30\")  X15 min      Therex:  15x SLR in hooklying  10x prone press ups  10x5\" DKTC stretch       NR-ed:  2x10\" SLS Ice x 5 min B knees      HEP: HS stretch with strap, ITB stretch with strap, ice     Charges: manual;1, therex:2       Total Timed Treatment: 45 min  Total Treatment Time: 45 min

## 2024-04-17 ENCOUNTER — OFFICE VISIT (OUTPATIENT)
Dept: PHYSICAL THERAPY | Facility: HOSPITAL | Age: 34
End: 2024-04-17
Attending: STUDENT IN AN ORGANIZED HEALTH CARE EDUCATION/TRAINING PROGRAM
Payer: COMMERCIAL

## 2024-04-17 PROCEDURE — 97110 THERAPEUTIC EXERCISES: CPT

## 2024-04-17 PROCEDURE — 97140 MANUAL THERAPY 1/> REGIONS: CPT

## 2024-04-17 NOTE — PROGRESS NOTES
Diagnosis:      L chronic  knee pain; B chronic knee pain     Referring Provider: Nyasia Carbajal  Date of Evaluation:    3/27/2024    Precautions:  None Next MD visit:   none scheduled  Date of Surgery: n/a   Insurance Primary/Secondary: [employer]IG[employer] / N/A     # Auth Visits: 8 per POC cign[employer]            Subjective: Pt. States L knee feels more swollen today.     Pain: 1-2/10      Objective: Refer to flow sheet. Progressed knee strengthening as tolerated.       Strength/MMT: (* denotes performed with pain)  Hip Knee Foot/Ankle   Flexion: R 4/5; L 4/5  Extension: R 4-/5; L 4-/5  Abduction: R 4/5; L 4/5  ER: R 4/5; L 4/5  IR: R 4/5; L 4/5 Flexion: R 4/5; L 4/5  Extension: R 4-/5; L 4-/5    DF: R 5/5; L 5/5  PF: R 5/5; L 5/5  INV: R 5/5; L 5/5  EV: R 5/5; L 5/5  Great toe ext: R 5/5; L 5/5       Assessment: Progressing global hip strength to reduce valgus forces on B knees. B hip adductors and hip flexors were tight, but improved mobility noted following stretching. HS and ITB flexibility improving.       Goals:   (to met in 8 visits)  Improve R/L LE strength by ½ grade to allow pt. to negotiate stairs with more ease  Decrease symptoms by 50% to allow pt. to tolerate walking for over 30 minutes to be able to go grocery shopping and run errands with more ease.  Improve B LE HS flexibility by 10 degrees to decrease stress on B knees and lumbar/pelvic region.  Pt. To be able to negotiate stairs with max 2/10 pain in B knees.   Pt. to be independent with HEP, joint protection principles, improved posture and body mechanics.    Plan: progress knee flexion mobility and global hip strength.  Date: 4/3/2024  TX#: 2/8 Date:    4/10/24             TX#: 3/8 Date:   4/17/24              TX#: 4/8 Date:                 TX#: 5/ Date:   Tx#: 6/   Therex:  2x30\" hamstring stretch on step  10x AMANDA with dowel jennifer  2x10 clam shells  2x10 SL hip abd  2x10 bridges TE  Scifit bike x 5 min  A/P board x 10  White board stretch 10 sec x 10  Quad  stretch on 8 inch step 10 sec x 10  Sand dune  *step ups x 10  *fwd lunge x 10  *mini squat with ball squeeze x 10  *lat lunge x 10  Resisted gait with spri red lat x 2 laps TE  Scifit bike x 5 min  A/P board x 10  White board stretch 10 sec x 10  Quad stretch on 8 inch step 10 sec x 10  Sand dune  *step ups x 10  *fwd lunge x 10  *mini squat with ball squeeze x 10  *lat lunge x 10  Resisted gait with spri red lat x 2 laps     ManuaL:  Superior patellar glides to improve knee extension, OP at tib and femur into extension, lateral quad release, B ham and adductor stretch (2x30\")  X15 min MT  Superior patellar glides to improve knee extension, OP at tib and femur into extension, lateral quad release, B ham and adductor stretch (2x30\")  X15 min MT  Superior patellar glides to improve knee extension, OP at tib and femur into extension, lateral quad release, B ham and adductor stretch (2x30\")  X15 min     Therex:  15x SLR in hooklying  10x prone press ups  10x5\" DKTC stretch       NR-ed:  2x10\" SLS Ice x 5 min B knees Ice x 5 min B knees     HEP: HS stretch with strap, ITB stretch with strap, ice     Charges: manual;1, therex:2       Total Timed Treatment: 45 min  Total Treatment Time: 45 min

## 2024-04-24 ENCOUNTER — APPOINTMENT (OUTPATIENT)
Dept: PHYSICAL THERAPY | Facility: HOSPITAL | Age: 34
End: 2024-04-24
Attending: STUDENT IN AN ORGANIZED HEALTH CARE EDUCATION/TRAINING PROGRAM
Payer: COMMERCIAL

## 2024-04-26 NOTE — PROGRESS NOTES
Notified via [facility]
Please inform patient of a normal path result. There was no cancer or precancer.
Render In Strict Bullet Format?: Yes
Discontinue Regimen: Synalar, Ketoconazole (pt states they are ineffective because she can’t shampoo every day or every other day)
Detail Level: Zone
Initiate Treatment: Apply Zoryve foam once daily to affected areas on the scalp. Shake container and turn upside down to dispense properly.\\nClobetasol solution Apply twice daily as needed for flares if Zoryve foam alone is not effective.
Continue Regimen: Finacea foam Apply to a thin layer entire face twice daily.
Plan: Not responsive to Soolantra

## 2024-05-01 ENCOUNTER — OFFICE VISIT (OUTPATIENT)
Dept: PHYSICAL THERAPY | Facility: HOSPITAL | Age: 34
End: 2024-05-01
Attending: STUDENT IN AN ORGANIZED HEALTH CARE EDUCATION/TRAINING PROGRAM
Payer: COMMERCIAL

## 2024-05-01 PROCEDURE — 97140 MANUAL THERAPY 1/> REGIONS: CPT

## 2024-05-01 PROCEDURE — 97110 THERAPEUTIC EXERCISES: CPT

## 2024-05-03 ENCOUNTER — OFFICE VISIT (OUTPATIENT)
Dept: PAIN CLINIC | Facility: CLINIC | Age: 34
End: 2024-05-03
Payer: COMMERCIAL

## 2024-05-03 VITALS — SYSTOLIC BLOOD PRESSURE: 122 MMHG | DIASTOLIC BLOOD PRESSURE: 62 MMHG | HEART RATE: 99 BPM | OXYGEN SATURATION: 98 %

## 2024-05-03 DIAGNOSIS — G89.29 OTHER CHRONIC PAIN: Primary | ICD-10-CM

## 2024-05-03 NOTE — PROGRESS NOTES
Patient presents in office today with reported pain in bilateral lower back and goes around bilateral hips.     Current pain level reported = 3/10    Last reported dose of NA today      Narcotic Contract renewal NA    Urine Drug screen NA

## 2024-05-03 NOTE — PROGRESS NOTES
Patient: Vanessa Gonzalez  Medical Record Number: [id number]  Site: [facility]  Referring Physician:  Dorcas Borrego  PCP: Dr. Carbajal    Dear Dr. Borrego:    Thank you very much for requesting this consultation. I had the opportunity to evaluate and initiate care for your patient today, as per your request.    HISTORY OF CHIEF COMPLAINT:      Vanessa Gonzalez is a 33 year old female, who complains of low back pain.    Patient is here today with pain in above described distribution that began ~2017.  Explains that she was loading a work station at work, and after standing up, had onset of pain.  She was taken to walk in clinic, where XR were negative, and was placed on muscle relaxers and NSAIDs, pain improved to a degree, and was \"good enough.\"  6 months later, again, while loading boxes at work, had increase in low back and LE pain.  Sent for MRI, which seemingly showed a disc bulge, and was sent to PT with work hardening.  Had further increase in pain in 2019, and repeat MRI (see below) showed an annular tear.  She was sent to pain management, and was sent for LESI, though, due to anxiety and \"crying hysterically\", had to cancel.  Underwent second BREANA attempt on 6/15/20, which, while well tolerated, did little if anything for her.  She was encouraged to consider pain psychology, though did not go.      She established with Dr. Dong, and was diagnosed with fibromyalgia.  She was sent for additional PT, and for ~3 months, states that she was nearly pain free.  Unfortunately, pain returned, now associated with abdominal pain, and was diagnosed with IBS.  She has been diligent with HEP, to only partial relief, and for a time, simply lived with it.  She developed pelvic pain, and underwent pelvic floor therapy in 11/2023, and while there, was also working on her back.  She has been working with chiropractor, without sustained relief.      She established with rheum, Dr. Medina,  and was sent for MRI of SI joint.  SI joints were fine, though continued to show inferior lumbar degenerative changes, and was sent for further options.      VAS Pain Score:  3/10    Aggravating Factors: Relieving Factors:   Any prolonged activity  Nothing specific      Past Treatment Attempted/Patient’s Response:  As above     Past Medical History:    Abdominal pain    All over pain, now localized to upper right/mid left    Anxiety    Diagnosed with anxiety    Arthritis    Arthritic changes to upper back    Back pain    L5/S1 bulging disc, annular tear, fibromyalgia    Back problem    L5 BULGING DISC    Belching    I noticed more frequent burping but it could also just be me    Bleeding nose    Bleeding gums only brushing, improved with dental cleanings    Bloating    I always feel like I'm bloated    Blood in the stool    Enough blood to fill toilet or just when wiping    Blood in urine    Not visible blood, microscopic, no infection    Body piercing    double pierced ear lobes, daith piercing fell out recently    Change in hair    Constipation    After a lot of diarrhea, constipation became more frequent    Diarrhea, unspecified    Every hour for whole day, got better but still frequent    Dizziness    Slight dizziness when i was nauseous. Had to lay down    Fatigue    I have fibromyalgia so i am always tired    Fever    I had lowgrade 99 fever with body aches but not since then    Fibromyalgia    Frequent urination    I was peeing every hour. Seems to be better now    Frequent use of laxatives    Headache disorder    I have migraines once a month for a week at a time    Heartburn    Occassional heart burn, not severe    Hemorrhoids    Prinary care confirmed past hemrrhoids with anal exam    History of cardiac murmur    Sinus arrhythmia, normal    History of depression    Diagnosed with depression    Indigestion    I couldn't eat anything without diarrhea    Irregular bowel habits    Elaina always have irregular  habits but worse now    Itch of skin    Chronic idiopathic urticaria    Leaking of urine    I will pee and then leak even after i am done going    Leg swelling    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 Stabbing pain left mid abdomen    Painful urination    There were times i had to push when i pee    Sleep disturbance    I had 2-3 weeks of insomnia, now i am sleeping normally    Stress    Uncomfortable fullness after meals    Doesn't always happen, not too much anymore    Visual impairment    GLASSES    Wears glasses    I've had glasses since i was a child, nearsighted    Weight gain    i was always hungry and would feel weak if i didn't eat    Weight loss    I lost a few lbs after being sick      Past Surgical History:   Procedure Laterality Date    Colonoscopy  2023    Removal of ovarian cyst(s)  2019      Family History   Problem Relation Age of Onset    Allergies Father     Other (Parkinson's disease) Father     Hypertension Mother     Allergies Mother     Colon Cancer Maternal Grandmother         She had colon cancer that spread to other organs    Heart Attack Maternal Grandfather          of a heart attack, smoker    Osteoporosis Paternal Grandmother     Bipolar Disorder Sister     Psoriasis Sister     Substance Abuse Brother     Diabetes Maternal Aunt     Diabetes Paternal Uncle     Other (cancer unsure what type) Paternal Uncle       Social History     Socioeconomic History    Marital status:    Tobacco Use    Smoking status: Never     Passive exposure: Current    Smokeless tobacco: Never    Tobacco comments:     Denied tobacco use   Vaping Use    Vaping  status: Never Used   Substance and Sexual Activity    Alcohol use: Never     Comment: Rare     Drug use: Never    Sexual activity: Yes     Partners: Male     Birth control/protection: Condom   Other Topics Concern    Caffeine Concern No    Exercise No      Current Medications:  Current Outpatient Medications   Medication Sig Dispense Refill    Ferrous Sulfate 325 (65 Fe) MG Oral Tab Take 1 tablet (325 mg total) by mouth daily with dinner. 90 tablet 1    Cholecalciferol (VITAMIN D) 50 MCG (2000 UT) Oral Cap Take 1 capsule (2,000 Units total) by mouth.      naproxen 500 MG Oral Tab Take 1 tablet (500 mg total) by mouth 2 (two) times daily as needed (take with food and big glass of water). 180 tablet 1    TRULANCE 3 MG Oral Tab Take 1 tablet by mouth daily. 30 tablet 5    fexofenadine 180 MG Oral Tab Take 1 tablet (180 mg total) by mouth daily.      cetirizine 10 MG Oral Tab Take 1 tablet (10 mg total) by mouth daily.      diphenhydrAMINE HCl (BENADRYL ALLERGY OR) Take by mouth.          Functional Assessment: Patient reports that they are able to complete all of their ADL's such as eating, bathing, using the toilet, dressing and getting up from a bed or a chair independently.      REVIEW OF SYSTEMS:   10 point review of systems is otherwise negative,unless otherwise in HPI.      Radiology/Lab Test Reviewed:  MRI SI joints 4/4/24:    CONCLUSION:    1. The SI joints appear within normal limits.  No significant arthropathy, joint effusions or evidence of sacroiliitis.   2. Mild to moderate disc disease at L4-5 and L5-S1 suspected with mild annular tear at L5-S1.   3. Multiple prominent bilateral ovarian physiologic follicles, the largest of which is seen on the left measuring 2.1 cm.  Mild physiologic free fluid in the posterior cul-de-sac.     MRI L spine 5/17/19:    T12-L1: The intervertebral disc is normal.   L1-L2: The intervertebral disc is normal.   L2-L3: The intervertebral disc is normal.   L3-L4: The  intervertebral disc is normal.   L4-L5: The intervertebral disc is normal.   L5-S1: There is mild disc space narrowing, disc desiccation, a tiny shallow central disc protrusion,   and an annular fissure. There is no central spinal canal stenosis or foraminal stenosis.     CBC:    Lab Results   Component Value Date    WBC 6.7 03/01/2024    WBC 6.4 03/05/2022    WBC 5.47 10/08/2019     Lab Results   Component Value Date    HEMOGLOBIN 12.5 06/29/2022    HEMOGLOBIN 11.9 (A) 01/24/2019    HEMOGLOBIN 13.8 09/03/2018     Lab Results   Component Value Date    .0 03/01/2024    .0 03/05/2022     10/08/2019         PHYSICAL EXAMIMATION   PHYSICAL EXAMINATION: Vanessa Gonzalez is a 33 year old female who is observed sitting comfortably on a chair in the exam room alert and oriented times three. She looks consistent with her stated age.    Ht Readings from Last 1 Encounters:   02/29/24 62\"     Wt Readings from Last 1 Encounters:   02/29/24 132 lb (59.9 kg)     The patient is well developed, well nourished, normal body habitus, well muscled. She moves independently from sitting to standing with ease.       Inspection:  No acute distress    Patient displays Non-antalgic gait, and is able to Normal heel walk, Normal toe walk.    Coordination:  Well-coordinated, fluent gait, able to engage in rapid alternating movements of upper and lower extremities.    ROM Lumbar Spine:  See chart below:  Motion Right (+ or -) Left (+ or -)   Lumbar Flexion + +   Lumbar Extension + +   Lumbar Bending + +   Lumbar Extension/ twisting motion + +     Lumbar/Sacral Integument:  Skin over lumbar sacral spine is intact without rashes, excoriations, lesions or erythema noted    Palpation:  See chart below:  Palpation of lumbar area Right (+ or -) Left (+ or -)   Lumbar facets + +   Lumbar paraspinals + +   Piriformis + +   SIJ + +   Trochanteric Bursa + +     Strength:  Strength of bilateral lower extremities grossly intact;  5/5 throughout    Sensation:  No sensory deficits noted on bilateral lower extremities to light touch    Tests:  Test Right (+ or -) Left (+ or -)   SLR - -   Noe’s     Babinski     Clonus       HEAD/NECK: Head is normocephalic, neck supple  EYES: EOMI, SOHAIL  SKIN EXAM: Skin is intact, head, neck, trunk and arms/legs. No rashes, mottling or ulcerations.  LYMPH EXAM: There is no lymph edema in either lower extremity.  VASCULAR EXAM: Pedal pulses are normal bilaterally, with good distal perfusion. No clubbing or cyanosis.  ABDOMINAL EXAM: Abdomen is soft without masses palpated.  HEART: normal, regular, S1 and S2  LUNGS:CTA  MUSCULOSKELETAL: Smooth, pain-free ROM to bilat hips, ankles, and knees.     Do you have any known blood/bleeding disorders?  No  Does patient currently take blood thinners?   None  Does patient currently take any antibiotics?   No      Patient is currently on pain medications:  No  Reason pain medications are prescribed: N/A  Pain medications are prescribed by: N/A  [place] Prescription Monitoring review: N/A  DIRE: N/A  Treatment decision: N/A    MEDICAL DECISION MAKING:     Impression: Lumbar disc bulge, chronic pain    Patient has chronic low back pain in the setting of MRI evidence from 2019 of L5-S1 disc bulge without any areas of significant stenosis with an annular tear.  Has failed multiple rounds of physical therapy, HEP, medications, and epidural steroid injections.  She had been diagnosed with fibromyalgia in 2020, and was following Dr. Dong, and did have some improvement while being actively treated for her fibro, though after discontinuation, return to baseline.  She has since been evaluated by rheumatology, and was sent for an MRI of her SI joints, which was negative for any SI joint pathology, but did continue to show degenerative changes of the inferior lumbar disks.    On exam, does have pain with range of motion lumbar spine in all planes of motion, as well as pain to  palpation of the lumbar paraspinal musculature, spinous processes, piriformis, and SI joints (superficial and deep).  No focal sensory/motor deficits.    We did discuss options, and given failure of previous epidural steroid injection is really not interested in any additional lumbar injections.  We did discuss possible medial branch blocks and staging for possible radiofrequency ablation, though states that she had an uncle who had this procedure, and is left him \"permanently damaged.\"  As such, she simply not interested in injections at this time.    We did discuss alternative options, and she was certainly willing to consider acupuncture.  In addition, we had discussed possible pain psychology though at this time, wishes to hold off on this.    Plan: Order in for integrative medicine.  Consider pain psychology.  Would defer to rheum for management of her fibromyalgia.  If she wants to consider injections, would give consideration to medial branch blocks.    The patient indicates understanding of these issues and agrees to the plan.      Thank you very much.     Respectfully yours,    KATARINA Martinez

## 2024-05-03 NOTE — PATIENT INSTRUCTIONS
Refill policies:    Allow 2-3 business days for refills; controlled substances may take longer.  Contact your pharmacy at least 5 days prior to running out of medication and have them send an electronic request or submit request through the “request refill” option in your [employer] account.  Refills are not addressed on weekends; covering physicians do not authorize routine medications on weekends.  No narcotics or controlled substances are refilled after noon on Fridays or by on call physicians.  By law, narcotics must be electronically prescribed.  A 30 day supply with no refills is the maximum allowed.  If your prescription is due for a refill, you may be due for a follow up appointment.  To best provide you care, patients receiving routine medications need to be seen at least once a year.  Patients receiving narcotic/controlled substance medications need to be seen at least once every 3 months.  In the event that your preferred pharmacy does not have the requested medication in stock (e.g. Backordered), it is your responsibility to find another pharmacy that has the requested medication available.  We will gladly send a new prescription to that pharmacy at your request.    Scheduling Tests:    If your physician has ordered radiology tests such as MRI or CT scans, please contact Central Scheduling at [phone] right away to schedule the test.  Once scheduled, the [facility] Centralized Referral Team will work with your insurance carrier to obtain pre-certification or prior authorization.  Depending on your insurance carrier, approval may take 3-10 days.  It is highly recommended patients assure they have received an authorization before having a test performed.  If test is done without insurance authorization, patient may be responsible for the entire amount billed.      Precertification and Prior Authorizations:  If your physician has recommended that you have a procedure or additional testing performed the [facility]  Centralized Referral Team will contact your insurance carrier to obtain pre-certification or prior authorization.    You are strongly encouraged to contact your insurance carrier to verify that your procedure/test has been approved and is a COVERED benefit.  Although the [facility] Centralized Referral Team does its due diligence, the insurance carrier gives the disclaimer that \"Although the procedure is authorized, this does not guarantee payment.\"    Ultimately the patient is responsible for payment.   Thank you for your understanding in this matter.  Paperwork Completion:  If you require FMLA or disability paperwork for your recovery, please make sure to either drop it off or have it faxed to our office at [phone]. Be sure the form has your name and date of birth on it.  The form will be faxed to our Forms Department and they will complete it for you.  There is a 25$ fee for all forms that need to be filled out.  Please be aware there is a 10-14 day turnaround time.  You will need to sign a release of information (STUART) form if your paperwork does not come with one.  You may call the Forms Department with any questions at [phone].  Their fax number is [phone].

## 2024-05-03 NOTE — PROGRESS NOTES
Location of Pain: bilateral lower back, bilateral hips, knees    Date Pain Began: 2017           Work Related:   No        Receiving Work Comp/Disability:   No    Numeric Rating Scale:  Pain at Present:  3                                                                                                            (No Pain) 0  to  10 (Worst Pain)                 Minimum Pain:   3  Maximum Pain  10    Distribution of Pain:    bilateral    Quality of Pain:   aching    Origin of Pain:    Lifting, Repetitive motion, and Degenerative    Aggravating Factors:    Other Depends on activity level    Past Treatments for Current Pain Condition:   Physical Therapy and Other Chiro, Injections    Prior diagnostic testing for your pain:  MRI XRay

## 2024-05-05 NOTE — PROGRESS NOTES
Diagnosis:      L chronic  knee pain; B chronic knee pain     Referring Provider: No ref. provider found  Date of Evaluation:    3/27/2024    Precautions:  None Next MD visit:   none scheduled  Date of Surgery: n/a   Insurance Primary/Secondary: [employer] / N/A     # Auth Visits: 8 per POC [employer]            Subjective: Pt. States L knee feels more swollen today.     Pain: 1-2/10      Objective: Refer to flow sheet. Progressed knee strengthening as tolerated.       Strength/MMT: (* denotes performed with pain)  Hip Knee Foot/Ankle   Flexion: R 4/5; L 4/5  Extension: R 4-/5; L 4-/5  Abduction: R 4/5; L 4/5  ER: R 4/5; L 4/5  IR: R 4/5; L 4/5 Flexion: R 4/5; L 4/5  Extension: R 4-/5; L 4-/5    DF: R 5/5; L 5/5  PF: R 5/5; L 5/5  INV: R 5/5; L 5/5  EV: R 5/5; L 5/5  Great toe ext: R 5/5; L 5/5       Assessment: Progressing global hip strength to reduce valgus forces on B knees. B hip adductors and hip flexors were tight, but improved mobility noted following stretching. HS and ITB flexibility improving.       Goals:   (to met in 8 visits)  Improve R/L LE strength by ½ grade to allow pt. to negotiate stairs with more ease  Decrease symptoms by 50% to allow pt. to tolerate walking for over 30 minutes to be able to go grocery shopping and run errands with more ease.  Improve B LE HS flexibility by 10 degrees to decrease stress on B knees and lumbar/pelvic region.  Pt. To be able to negotiate stairs with max 2/10 pain in B knees.   Pt. to be independent with HEP, joint protection principles, improved posture and body mechanics.    Plan: progress knee flexion mobility and global hip strength.  Date: 4/3/2024  TX#: 2/8 Date:    4/10/24             TX#: 3/8 Date:   4/17/24              TX#: 4/8 Date:  5/1/24               TX#: 5/8 Date:   Tx#: 6/   Therex:  2x30\" hamstring stretch on step  10x AMANDA with dowel jennifer  2x10 clam shells  2x10 SL hip abd  2x10 bridges TE  Scifit bike x 5 min  A/P board x 10  White board stretch 10 sec x  10  Quad stretch on 8 inch step 10 sec x 10  Sand dune  *step ups x 10  *fwd lunge x 10  *mini squat with ball squeeze x 10  *lat lunge x 10  Resisted gait with spri red lat x 2 laps TE  Scifit bike x 5 min  A/P board x 10  White board stretch 10 sec x 10  Quad stretch on 8 inch step 10 sec x 10  Sand dune  *step ups x 10  *fwd lunge x 10  *mini squat with ball squeeze x 10  *lat lunge x 10  Resisted gait with spri red lat x 2 laps TE  Scifit bike x 5 min  A/P board x 10  White board stretch 10 sec x 10  Quad stretch on 8 inch step 10 sec x 10  Sand dune  *step ups x 10  *fwd lunge x 10  *mini squat with ball squeeze x 10  *lat lunge x 10  Shuttle B squat 2C 2 x 15  DKTC c SB x 10  Bridge c SB x 10  LTR c SB x 10        ManuaL:  Superior patellar glides to improve knee extension, OP at tib and femur into extension, lateral quad release, B ham and adductor stretch (2x30\")  X15 min MT  Superior patellar glides to improve knee extension, OP at tib and femur into extension, lateral quad release, B ham and adductor stretch (2x30\")  X15 min MT  Superior patellar glides to improve knee extension, OP at tib and femur into extension, lateral quad release, B ham and adductor stretch (2x30\")  X15 min MT  Superior patellar glides to improve knee extension, OP at tib and femur into extension, lateral quad release, B ham and adductor stretch and ITB (2x30\")  X10 min    Therex:  15x SLR in hooklying  10x prone press ups  10x5\" DKTC stretch       NR-ed:  2x10\" SLS Ice x 5 min B knees Ice x 5 min B knees Ice x 5 min    HEP: HS stretch with strap, ITB stretch with strap, ice     Charges: manual;1, therex:2       Total Timed Treatment: 45 min  Total Treatment Time: 45 min   37

## 2024-05-08 ENCOUNTER — TELEPHONE (OUTPATIENT)
Dept: PHYSICAL THERAPY | Facility: HOSPITAL | Age: 34
End: 2024-05-08

## 2024-05-08 ENCOUNTER — APPOINTMENT (OUTPATIENT)
Dept: PHYSICAL THERAPY | Facility: HOSPITAL | Age: 34
End: 2024-05-08
Attending: STUDENT IN AN ORGANIZED HEALTH CARE EDUCATION/TRAINING PROGRAM
Payer: COMMERCIAL

## 2024-05-15 ENCOUNTER — OFFICE VISIT (OUTPATIENT)
Dept: PHYSICAL THERAPY | Facility: HOSPITAL | Age: 34
End: 2024-05-15
Attending: STUDENT IN AN ORGANIZED HEALTH CARE EDUCATION/TRAINING PROGRAM
Payer: COMMERCIAL

## 2024-05-15 PROCEDURE — 97140 MANUAL THERAPY 1/> REGIONS: CPT

## 2024-05-15 PROCEDURE — 97110 THERAPEUTIC EXERCISES: CPT

## 2024-05-22 ENCOUNTER — OFFICE VISIT (OUTPATIENT)
Dept: PHYSICAL THERAPY | Facility: HOSPITAL | Age: 34
End: 2024-05-22
Attending: STUDENT IN AN ORGANIZED HEALTH CARE EDUCATION/TRAINING PROGRAM
Payer: COMMERCIAL

## 2024-05-22 PROCEDURE — 97110 THERAPEUTIC EXERCISES: CPT

## 2024-05-22 NOTE — PROGRESS NOTES
Diagnosis:      L chronic  knee pain; B chronic knee pain     Referring Provider: No ref. provider found  Date of Evaluation:    3/27/2024    Precautions:  None Next MD visit:   none scheduled  Date of Surgery: n/a   Insurance Primary/Secondary: [employer] / N/A     # Auth Visits: 8 per POC cign[employer]            Subjective: Pt. States L knee feels pretty good today. No pain currently.     Pain: 0/10      Objective: Refer to flow sheet.Knee strength improving. Functional mobility with stairs and squats improving.     Assessment: Progressing global hip strength to reduce valgus forces on B knees. HS and ITB flexibility improving.       Goals:   (to met in 8 visits)  Improve R/L LE strength by ½ grade to allow pt. to negotiate stairs with more ease  Decrease symptoms by 50% to allow pt. to tolerate walking for over 30 minutes to be able to go grocery shopping and run errands with more ease.  Improve B LE HS flexibility by 10 degrees to decrease stress on B knees and lumbar/pelvic region.  Pt. To be able to negotiate stairs with max 2/10 pain in B knees.   Pt. to be independent with HEP, joint protection principles, improved posture and body mechanics.    Plan: reassess next visit. Review/update HEP for discharge.   Date: 4/3/2024  TX#: 2/8 Date:    4/10/24             TX#: 3/8 Date:   4/17/24              TX#: 4/8 Date:  5/1/24               TX#: 5/8 Date: 5/15/24  Tx#: 6/8   Therex:  2x30\" hamstring stretch on step  10x AMANDA with dowel jennifer  2x10 clam shells  2x10 SL hip abd  2x10 bridges TE  Scifit bike x 5 min  A/P board x 10  White board stretch 10 sec x 10  Quad stretch on 8 inch step 10 sec x 10  Sand dune  *step ups x 10  *fwd lunge x 10  *mini squat with ball squeeze x 10  *lat lunge x 10  Resisted gait with spri red lat x 2 laps TE  Scifit bike x 5 min  A/P board x 10  White board stretch 10 sec x 10  Quad stretch on 8 inch step 10 sec x 10  Sand dune  *step ups x 10  *fwd lunge x 10  *mini squat with ball squeeze x  10  *lat lunge x 10  Resisted gait with spri red lat x 2 laps TE  Scifit bike x 5 min  A/P board x 10  White board stretch 10 sec x 10  Quad stretch on 8 inch step 10 sec x 10  Sand dune  *step ups x 10  *fwd lunge x 10  *mini squat with ball squeeze x 10  *lat lunge x 10  Shuttle B squat 2C 2 x 15  DKTC c SB x 10  Bridge c SB x 10  LTR c SB x 10     TE  Scifit bike x 5 min  A/P board x 10  White board stretch 10 sec x 10  Quad stretch on 8 inch step 10 sec x 10  Sand dune  *step ups x 10  *fwd lunge x 10  *mini squat with ball squeeze x 10  *lat lunge x 10  Shuttle B squat 2C x 15  DKTC c SB x 10  Bridge c SB x 10  LTR c SB x 10   ManuaL:  Superior patellar glides to improve knee extension, OP at tib and femur into extension, lateral quad release, B ham and adductor stretch (2x30\")  X15 min MT  Superior patellar glides to improve knee extension, OP at tib and femur into extension, lateral quad release, B ham and adductor stretch (2x30\")  X15 min MT  Superior patellar glides to improve knee extension, OP at tib and femur into extension, lateral quad release, B ham and adductor stretch (2x30\")  X15 min MT  Superior patellar glides to improve knee extension, OP at tib and femur into extension, lateral quad release, B ham and adductor stretch and ITB (2x30\")  X10 min MT  Superior patellar glides to improve knee extension, OP at tib and femur into extension, lateral quad release, B ham and adductor stretch and ITB (2x30\")  X10 min   Therex:  15x SLR in hooklying  10x prone press ups  10x5\" DKTC stretch       NR-ed:  2x10\" SLS Ice x 5 min B knees Ice x 5 min B knees Ice x 5 min Ice x 5 min   HEP: HS stretch with strap, ITB stretch with strap, ice     Charges: manual;1, therex:2       Total Timed Treatment: 45 min  Total Treatment Time: 45 min

## 2024-05-23 NOTE — PROGRESS NOTES
Dear Dr. Nyasia Carbajal MD,    This letter is to inform you of Vanessa Gonzalez's DISCHARGE SUMMARY in Physical Therapy at [facility].    DX: L chronic  knee pain; B chronic knee pain         TREATMENT #  7     Of   7      DATE OF SERVICE: 5/22/2024    ASSESSMENT  Patient reports overall 80% improvement. States 0-3/10 pain in L knee. Patient reports she is independent with current HEP and will continue on her own at this time.    OTHER  Pain: L knee pain fluctuates between 0-3/10 knee pain. More when when she experiences swelling throughout her body.   Palpation: no significant tenderness noted this date.   Edema: R joint like: 38cm, L joint line 38.5cm    AROM: (* denotes performed with pain)  Hip Knee Foot/Ankle   WFL Flexion: R 130*; L 130*  Extension: R 0; L 0    WFL     Accessory motion: lateral patellar tracking B     Flexibility:  Hip Flexor: R mod, L mod  Hamstrings: R lacking 40 deg; L lacking 50 deg  Piriformis: R mod; L mod  ITB: R mod; L mod  Quads: R mod; L mod  Gastroc-soleus: R mild; L mild    Strength/MMT: (* denotes performed with pain)  Hip Knee Foot/Ankle   Flexion: R 4+/5; L 4+/5  Extension: R 4/5; L 4/5  Abduction: R 4+/5; L 4+/5  ER: R 4+/5; L 4/+5  IR: R 4+/5; L 4+/5 Flexion: R 4+/5; L 4+/5  Extension: R 4/5; L 4/5    DF: R 5/5; L 5/5  PF: R 5/5; L 5/5  INV: R 5/5; L 5/5  EV: R 5/5; L 5/5  Great toe ext: R 5/5; L 5/5     Special tests:   (-) B Valentin test, (-) B thessaly, (+) B lateral patellar tracking    Gait: pt ambulates on level ground without any gait deficits. Improved step length L, Improved stance phase L, and improved hip/knee flex or ext L  Gait speed 2mph; Step length L 55cm, R 55cm; % of time on R and L 50%  Balance: SLS: R 30 sec, L 30 sec    LONG AND SHORT TERM GOALS     (to met in 8 visits)  Improve R/L LE strength by ½ grade to allow pt. to negotiate stairs with more ease. Met  Decrease symptoms by 50% to  allow pt. to tolerate walking for over 30 minutes to be able to go grocery shopping and run errands with more ease. Met  Improve B LE HS flexibility by 10 degrees to decrease stress on B knees and lumbar/pelvic region. Met  Pt. To be able to negotiate stairs with max 2/10 pain in B knees. Met  Pt. to be independent with HEP, joint protection principles, improved posture and body mechanics. Met    RECOMMENDATIONS AND PLAN OF TREATMENT  All goals met. Discharge patient. Patient to continue independently with current HEP.    REHAB POTENTIAL  Good - All functional goals met.    Patient/family/caregiver was advised of these findings, precautions, and treatment options and has agreed to actively participate in planning and for this course of care.    THANK YOU FOR THE REFERRAL OF Vanessa Gonzalez.  IF YOU HAVE ANY QUESTIONS PLEASE CONTACT ME AT: [facility] [phone]          SINCERELY,           FRANCO MORALES, PT    PHYSICIAN'S CERTIFICATION REQUIRED: no  I certify the need for these services furnished under this plan of treatment and while under my care.    X_________________________________________________ Date: ____________________    CERTIFICATION FROM: 5/22/2024 to 8/20/2024    Thank you for the referral of Vanessa Gonzalez.     Fax #: [facility] [phone]  [id number]

## 2024-05-31 ENCOUNTER — IMMUNIZATION (OUTPATIENT)
Dept: LAB | Age: 34
End: 2024-05-31
Attending: EMERGENCY MEDICINE

## 2024-05-31 DIAGNOSIS — Z23 NEED FOR VACCINATION: Primary | ICD-10-CM

## 2024-05-31 PROCEDURE — 90686 IIV4 VACC NO PRSV 0.5 ML IM: CPT

## 2024-05-31 PROCEDURE — 90471 IMMUNIZATION ADMIN: CPT

## 2024-06-12 ENCOUNTER — OFFICE VISIT (OUTPATIENT)
Dept: FAMILY MEDICINE CLINIC | Facility: CLINIC | Age: 34
End: 2024-06-12
Payer: COMMERCIAL

## 2024-06-12 VITALS
HEART RATE: 84 BPM | RESPIRATION RATE: 16 BRPM | BODY MASS INDEX: 23.4 KG/M2 | DIASTOLIC BLOOD PRESSURE: 60 MMHG | TEMPERATURE: 97 F | HEIGHT: 62 IN | WEIGHT: 127.19 LBS | SYSTOLIC BLOOD PRESSURE: 118 MMHG

## 2024-06-12 DIAGNOSIS — M25.562 CHRONIC PAIN OF BOTH KNEES: ICD-10-CM

## 2024-06-12 DIAGNOSIS — G89.29 CHRONIC BACK PAIN, UNSPECIFIED BACK LOCATION, UNSPECIFIED BACK PAIN LATERALITY: ICD-10-CM

## 2024-06-12 DIAGNOSIS — M25.561 CHRONIC PAIN OF BOTH KNEES: ICD-10-CM

## 2024-06-12 DIAGNOSIS — M25.50 POLYARTHRALGIA: ICD-10-CM

## 2024-06-12 DIAGNOSIS — M62.830 MUSCLE SPASM OF BACK: ICD-10-CM

## 2024-06-12 DIAGNOSIS — K58.2 IRRITABLE BOWEL SYNDROME WITH BOTH CONSTIPATION AND DIARRHEA: ICD-10-CM

## 2024-06-12 DIAGNOSIS — G89.29 CHRONIC PAIN OF BOTH KNEES: ICD-10-CM

## 2024-06-12 DIAGNOSIS — R10.13 EPIGASTRIC PAIN: Primary | ICD-10-CM

## 2024-06-12 DIAGNOSIS — M54.9 CHRONIC BACK PAIN, UNSPECIFIED BACK LOCATION, UNSPECIFIED BACK PAIN LATERALITY: ICD-10-CM

## 2024-06-12 PROCEDURE — 99214 OFFICE O/P EST MOD 30 MIN: CPT | Performed by: STUDENT IN AN ORGANIZED HEALTH CARE EDUCATION/TRAINING PROGRAM

## 2024-06-12 RX ORDER — CYCLOBENZAPRINE HCL 10 MG
10 TABLET ORAL NIGHTLY PRN
Qty: 30 TABLET | Refills: 0 | Status: SHIPPED | OUTPATIENT
Start: 2024-06-12

## 2024-06-12 RX ORDER — OMEPRAZOLE 40 MG/1
40 CAPSULE, DELAYED RELEASE ORAL DAILY
Qty: 90 CAPSULE | Refills: 0 | Status: SHIPPED | OUTPATIENT
Start: 2024-06-12

## 2024-06-12 NOTE — PROGRESS NOTES
[facility] Note  06/12/24    Chief Complaint   Patient presents with    Follow - Up     On arthritis in knee    Diarrhea     HPI:   Vanessa Gonzalez is a 33 year old female who presents for follow up.    Patient presents for follow up of knee pain. Flares around period now. Not taking naproxen as much, will take during menstrual cycle. Did physical therapy and it helped.    Saw rheumatologist, then pain management.     Following with chiropractor for her back and chronic pain, booked for 36 sessions and would like to see how it goes. Has a set schedule. Chiro Carloat Altman in [place] off of [facility]. Will be going three times a week through part of July 2024.    Still having intermittent fibromyalgia flares. Will last a few days at a time. Travel makes it worse.  Leaving Tuesday for best friend's wedding. Will be heading back on Saturday.     Stomach acting up as well. Not eating a lot. Following BRAT. Has not traveled recently out of country. No odd foods.    Having some RUQ pain still.     Wt Readings from Last 6 Encounters:   06/12/24 127 lb 3.2 oz (57.7 kg)   02/29/24 132 lb (59.9 kg)   02/19/24 132 lb 3.2 oz (60 kg)   11/09/23 127 lb 12.8 oz (58 kg)   10/27/23 127 lb 13.6 oz (58 kg)   10/27/23 127 lb 13.9 oz (58 kg)       Past Medical History:    Abdominal pain    All over pain, now localized to upper right/mid left    Anxiety    Diagnosed with anxiety    Arthritis    Arthritic changes to upper back    Back pain    L5/S1 bulging disc, annular tear, fibromyalgia    Back problem    L5 BULGING DISC    Belching    I noticed more frequent burping but it could also just be me    Bleeding nose    Bleeding gums only brushing, improved with dental cleanings    Bloating    I always feel like I'm bloated    Blood in the stool    Enough blood to fill toilet or just when wiping    Blood in urine    Not visible blood, microscopic, no infection    Body piercing    double  pierced ear lobes, daith piercing fell out recently    Change in hair    Constipation    After a lot of diarrhea, constipation became more frequent    Diarrhea, unspecified    Every hour for whole day, got better but still frequent    Dizziness    Slight dizziness when i was nauseous. Had to lay down    Fatigue    I have fibromyalgia so i am always tired    Fever    I had lowgrade 99 fever with body aches but not since then    Fibromyalgia    Frequent urination    I was peeing every hour. Seems to be better now    Frequent use of laxatives    Headache disorder    I have migraines once a month for a week at a time    Heartburn    Occassional heart burn, not severe    Hemorrhoids    Prinary care confirmed past hemrrhoids with anal exam    History of cardiac murmur    Sinus arrhythmia, normal    History of depression    Diagnosed with depression    Indigestion    I couldn't eat anything without diarrhea    Irregular bowel habits    Elaina always have irregular habits but worse now    Itch of skin    Chronic idiopathic urticaria    Leaking of urine    I will pee and then leak even after i am done going    Leg swelling    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 Stabbing pain left mid abdomen    Painful urination    There were times i had to push when i pee    Sleep disturbance    I had 2-3 weeks of insomnia, now i am sleeping normally    Stress    Uncomfortable fullness after meals    Doesn't always happen, not too much anymore    Visual impairment    GLASSES    Wears glasses    I've had glasses since i was a child, nearsighted    Weight gain    i was always hungry and would feel weak if i  didn't eat    Weight loss    I lost a few lbs after being sick     Past Surgical History:   Procedure Laterality Date    Colonoscopy  01/01/2023    Removal of ovarian cyst(s)  01/2019     Allergies   Allergen Reactions    Cat Hair Extract HIVES, ITCHING and Runny nose    Duloxetine Hcl ANXIETY    Gabapentin ANXIETY, FATIGUE and OTHER (SEE COMMENTS)    Peanuts ITCHING    Diclofenac DIARRHEA and OTHER (SEE COMMENTS)    Diclofenac Sodium DIARRHEA and OTHER (SEE COMMENTS)    Lyrica INSOMNIA    Nickel RASH    Nickel RASH    Pregabalin INSOMNIA    Seasonal Runny nose and OTHER (SEE COMMENTS)     Sneezing, watery eyes      Omeprazole 40 MG Oral Capsule Delayed Release Take 1 capsule (40 mg total) by mouth daily. 90 capsule 0    cyclobenzaprine 10 MG Oral Tab Take 1 tablet (10 mg total) by mouth nightly as needed for Muscle spasms. 30 tablet 0    Ferrous Sulfate 325 (65 Fe) MG Oral Tab Take 1 tablet (325 mg total) by mouth daily with dinner. 90 tablet 1    Cholecalciferol (VITAMIN D) 50 MCG (2000 UT) Oral Cap Take 1 capsule (2,000 Units total) by mouth.      naproxen 500 MG Oral Tab Take 1 tablet (500 mg total) by mouth 2 (two) times daily as needed (take with food and big glass of water). 180 tablet 1    TRULANCE 3 MG Oral Tab Take 1 tablet by mouth daily. 30 tablet 5    fexofenadine 180 MG Oral Tab Take 1 tablet (180 mg total) by mouth daily.      cetirizine 10 MG Oral Tab Take 1 tablet (10 mg total) by mouth daily.      diphenhydrAMINE HCl (BENADRYL ALLERGY OR) Take by mouth.       Social History     Socioeconomic History    Marital status:    Tobacco Use    Smoking status: Never     Passive exposure: Current    Smokeless tobacco: Never    Tobacco comments:     Denied tobacco use   Vaping Use    Vaping status: Never Used   Substance and Sexual Activity    Alcohol use: Never     Comment: Rare     Drug use: Never    Sexual activity: Yes     Partners: Male     Birth control/protection: Condom   Other Topics Concern     Caffeine Concern No    Exercise No     Social Determinants of Health      Received from [facility]     Counseling given: Not Answered  Tobacco comments: Denied tobacco use    Family History   Problem Relation Age of Onset    Allergies Father     Other (Parkinson's disease) Father     Hypertension Mother     Allergies Mother     Colon Cancer Maternal Grandmother         She had colon cancer that spread to other organs    Heart Attack Maternal Grandfather          of a heart attack, smoker    Osteoporosis Paternal Grandmother     Bipolar Disorder Sister     Psoriasis Sister     Substance Abuse Brother     Diabetes Maternal Aunt     Diabetes Paternal Uncle     Other (cancer unsure what type) Paternal Uncle      Family Status   Relation Status    Fa Alive    Mo Alive    MGMA     MGFA (Not Specified)    PGMA (Not Specified)    Sis Alive    Bro Alive    Mat Aunt (Not Specified)    Paternal Unc (Not Specified)        REVIEW OF SYSTEMS:   See HPI    EXAM:   /60 (BP Location: Left arm, Patient Position: Sitting, Cuff Size: adult)   Pulse 84   Temp 97.2 °F (36.2 °C) (Temporal)   Resp 16   Ht 5' 2\" (1.575 m)   Wt 127 lb 3.2 oz (57.7 kg)   LMP 2024 (Exact Date)   BMI 23.27 kg/m²  Estimated body mass index is 23.27 kg/m² as calculated from the following:    Height as of this encounter: 5' 2\" (1.575 m).    Weight as of this encounter: 127 lb 3.2 oz (57.7 kg).   Vital signs reviewed. Appears stated age, well groomed.  Physical Exam:  GEN:  Patient is alert and oriented x3, no apparent distress  HEAD:  Normocephalic, atraumatic  HEENT:  no scleral icterus, conjunctivae clear bilaterally, EOMI, PERRLA, OP clear  LUNGS: clear to auscultation bilaterally, no rales/rhonchi/wheezing  HEART:  Regular rate and rhythm, normal S1/S2, no murmurs, rubs or gallops  ABDOMEN:  Bowel sounds normal, soft, nondistended, minimal RUQ tenderness without rebound or guarding, no  hepatosplenomegaly  EXTREMITIES:  Moves all extremities well, no point tenderness in spine  NEURO:  CN 2 - 12 grossly intact, gait normal    ASSESSMENT AND PLAN:   1. Epigastric pain  Patient presents for intermittent epigastric pain.  Suspect gastritis, GERD.  Will start omeprazole 40 mg daily.  Ault diet.  Continue to monitor symptoms.  Follow-up if no improvement after 3 months.  Start omeprazole 6/13/24.  - Omeprazole 40 MG Oral Capsule Delayed Release; Take 1 capsule (40 mg total) by mouth daily.  Dispense: 90 capsule; Refill: 0    2. Chronic pain of both knees  - improved s/p physical therapy     3. Polyarthralgia  5. Chronic back pain, unspecified back location, unspecified back pain laterality  6. Muscle spasm of back  Patient has chronic back pain, chronic pain in multiple joints.  Currently working with chiropractor, appreciate evaluation recommendations.  Patient has muscle spasm of back will trial cyclobenzaprine as needed - caution for drowsiness. Will refer to acupuncture if ended after completion of chiropractor sessions.  Start naproxen on 6/16/24 prior to upcoming travel.  Use cyclobenzaprine as needed in the evening before bed.  - cyclobenzaprine 10 MG Oral Tab; Take 1 tablet (10 mg total) by mouth nightly as needed for Muscle spasms.  Dispense: 30 tablet; Refill: 0    4. Irritable bowel syndrome with both constipation and diarrhea  Following with GI, on Trulance. Can take imodium as needed if no fever or blood in stool        Meds & Refills for this Visit:  Requested Prescriptions     Signed Prescriptions Disp Refills    Omeprazole 40 MG Oral Capsule Delayed Release 90 capsule 0     Sig: Take 1 capsule (40 mg total) by mouth daily.    cyclobenzaprine 10 MG Oral Tab 30 tablet 0     Sig: Take 1 tablet (10 mg total) by mouth nightly as needed for Muscle spasms.       Start Taking               Omeprazole 40 MG Oral Capsule Delayed Release Take 1 capsule (40 mg total) by mouth daily.     cyclobenzaprine 10 MG Oral Tab Take 1 tablet (10 mg total) by mouth nightly as needed for Muscle spasms.            Health Maintenance:  Health Maintenance Due   Topic Date Due    Annual Physical  Never done    DTaP,Tdap,and Td Vaccines (1 - Tdap) Never done    COVID-19 Vaccine (4 - 2023-24 season) 09/01/2023    Annual Depression Screening  01/01/2024       Patient/Caregiver Education: There are no barriers to learning. Medical education done.   Outcome: Patient verbalizes understanding. Patient is notified to call with any questions, complications, allergies, or worsening or changing symptoms.  Patient is to call with any side effects or complications from the treatments as a result of today.     Problem List:  Patient Active Problem List   Diagnosis    Lumbago    Well woman exam with routine gynecological exam    Screen for STD (sexually transmitted disease)    Discogenic low back pain    Encounter for surveillance of contraceptive pills    Fibromyalgia    Chronic urticaria    Positive OR[place]O (antinuclear antibody)    Polyarthralgia    Urinary retention    Urinary incontinence    Vitamin D deficiency    Raynaud's phenomenon without gangrene    History of hyperprolactinemia    Pituitary microadenoma (HCC)    Right upper quadrant pain    Diarrhea, unspecified    Chronic superficial gastritis without bleeding    Gastroesophageal reflux disease without esophagitis    Chronic heartburn    Abdominal bloating    Hematochezia    Chronic pain of left knee       Return if symptoms worsen or fail to improve.    Nyasia Carbajal MD  [facility]  06/12/24      Please note that portions of this note may have been completed with a voice recognition program. Efforts were made to edit the dictations but occasionally words are mis-transcribed. Thank you for your understanding.

## 2024-06-12 NOTE — PATIENT INSTRUCTIONS
Start omeprazole 6/13/24.  Start naproxen on 6/16/24.   Use cyclobenzaprine as needed in the evening before bed.  You can take imodium as needed if no fever or blood in stool.

## 2024-09-06 DIAGNOSIS — R10.13 EPIGASTRIC PAIN: ICD-10-CM

## 2024-09-09 RX ORDER — OMEPRAZOLE 40 MG/1
40 CAPSULE, DELAYED RELEASE ORAL DAILY
Qty: 90 CAPSULE | Refills: 0 | Status: SHIPPED | OUTPATIENT
Start: 2024-09-09

## 2024-09-11 ENCOUNTER — LAB ENCOUNTER (OUTPATIENT)
Dept: LAB | Age: 34
End: 2024-09-11
Attending: STUDENT IN AN ORGANIZED HEALTH CARE EDUCATION/TRAINING PROGRAM
Payer: COMMERCIAL

## 2024-09-11 ENCOUNTER — OFFICE VISIT (OUTPATIENT)
Dept: FAMILY MEDICINE CLINIC | Facility: CLINIC | Age: 34
End: 2024-09-11
Payer: COMMERCIAL

## 2024-09-11 VITALS
RESPIRATION RATE: 16 BRPM | HEIGHT: 62 IN | WEIGHT: 128.63 LBS | DIASTOLIC BLOOD PRESSURE: 78 MMHG | HEART RATE: 72 BPM | TEMPERATURE: 98 F | BODY MASS INDEX: 23.67 KG/M2 | SYSTOLIC BLOOD PRESSURE: 112 MMHG

## 2024-09-11 DIAGNOSIS — H53.9 VISUAL CHANGES: ICD-10-CM

## 2024-09-11 DIAGNOSIS — R51.9 CHRONIC HEADACHE WITH NEW FEATURES: ICD-10-CM

## 2024-09-11 DIAGNOSIS — G89.29 CHRONIC HEADACHE WITH NEW FEATURES: ICD-10-CM

## 2024-09-11 DIAGNOSIS — H61.21 IMPACTED CERUMEN OF RIGHT EAR: ICD-10-CM

## 2024-09-11 DIAGNOSIS — G43.119 INTRACTABLE MIGRAINE WITH AURA WITHOUT STATUS MIGRAINOSUS: Primary | ICD-10-CM

## 2024-09-11 DIAGNOSIS — G43.119 INTRACTABLE MIGRAINE WITH AURA WITHOUT STATUS MIGRAINOSUS: ICD-10-CM

## 2024-09-11 DIAGNOSIS — R25.3 MUSCLE TWITCH: ICD-10-CM

## 2024-09-11 LAB
ALBUMIN SERPL-MCNC: 4.9 G/DL (ref 3.2–4.8)
ALBUMIN/GLOB SERPL: 1.8 {RATIO} (ref 1–2)
ALP LIVER SERPL-CCNC: 48 U/L
ALT SERPL-CCNC: 11 U/L
ANION GAP SERPL CALC-SCNC: 7 MMOL/L (ref 0–18)
AST SERPL-CCNC: 17 U/L (ref ?–34)
BASOPHILS # BLD AUTO: 0.03 X10(3) UL (ref 0–0.2)
BASOPHILS NFR BLD AUTO: 0.5 %
BILIRUB SERPL-MCNC: 0.5 MG/DL (ref 0.3–1.2)
BUN BLD-MCNC: 11 MG/DL (ref 9–23)
CALCIUM BLD-MCNC: 9.8 MG/DL (ref 8.7–10.4)
CHLORIDE SERPL-SCNC: 107 MMOL/L (ref 98–112)
CO2 SERPL-SCNC: 27 MMOL/L (ref 21–32)
CREAT BLD-MCNC: 0.76 MG/DL
DEPRECATED HBV CORE AB SER IA-ACNC: 15 NG/ML
EGFRCR SERPLBLD CKD-EPI 2021: 107 ML/MIN/1.73M2 (ref 60–?)
EOSINOPHIL # BLD AUTO: 0.05 X10(3) UL (ref 0–0.7)
EOSINOPHIL NFR BLD AUTO: 0.8 %
ERYTHROCYTE [DISTWIDTH] IN BLOOD BY AUTOMATED COUNT: 11.7 %
FASTING STATUS PATIENT QL REPORTED: NO
GLOBULIN PLAS-MCNC: 2.7 G/DL (ref 2–3.5)
GLUCOSE BLD-MCNC: 83 MG/DL (ref 70–99)
HCT VFR BLD AUTO: 35.8 %
HGB BLD-MCNC: 12.3 G/DL
IMM GRANULOCYTES # BLD AUTO: 0.02 X10(3) UL (ref 0–1)
IMM GRANULOCYTES NFR BLD: 0.3 %
IRON SATN MFR SERPL: 37 %
IRON SERPL-MCNC: 107 UG/DL
LYMPHOCYTES # BLD AUTO: 1.86 X10(3) UL (ref 1–4)
LYMPHOCYTES NFR BLD AUTO: 30.7 %
MCH RBC QN AUTO: 31 PG (ref 26–34)
MCHC RBC AUTO-ENTMCNC: 34.4 G/DL (ref 31–37)
MCV RBC AUTO: 90.2 FL
MONOCYTES # BLD AUTO: 0.4 X10(3) UL (ref 0.1–1)
MONOCYTES NFR BLD AUTO: 6.6 %
NEUTROPHILS # BLD AUTO: 3.7 X10 (3) UL (ref 1.5–7.7)
NEUTROPHILS # BLD AUTO: 3.7 X10(3) UL (ref 1.5–7.7)
NEUTROPHILS NFR BLD AUTO: 61.1 %
OSMOLALITY SERPL CALC.SUM OF ELEC: 291 MOSM/KG (ref 275–295)
PLATELET # BLD AUTO: 261 10(3)UL (ref 150–450)
POTASSIUM SERPL-SCNC: 3.8 MMOL/L (ref 3.5–5.1)
PROT SERPL-MCNC: 7.6 G/DL (ref 5.7–8.2)
RBC # BLD AUTO: 3.97 X10(6)UL
SODIUM SERPL-SCNC: 141 MMOL/L (ref 136–145)
TOTAL IRON BINDING CAPACITY: 291 UG/DL (ref 250–425)
TRANSFERRIN SERPL-MCNC: 223 MG/DL (ref 250–380)
WBC # BLD AUTO: 6.1 X10(3) UL (ref 4–11)

## 2024-09-11 PROCEDURE — 83550 IRON BINDING TEST: CPT

## 2024-09-11 PROCEDURE — 82728 ASSAY OF FERRITIN: CPT

## 2024-09-11 PROCEDURE — 83735 ASSAY OF MAGNESIUM: CPT

## 2024-09-11 PROCEDURE — 69210 REMOVE IMPACTED EAR WAX UNI: CPT | Performed by: STUDENT IN AN ORGANIZED HEALTH CARE EDUCATION/TRAINING PROGRAM

## 2024-09-11 PROCEDURE — 83540 ASSAY OF IRON: CPT

## 2024-09-11 PROCEDURE — 85025 COMPLETE CBC W/AUTO DIFF WBC: CPT

## 2024-09-11 PROCEDURE — 80053 COMPREHEN METABOLIC PANEL: CPT

## 2024-09-11 PROCEDURE — 99214 OFFICE O/P EST MOD 30 MIN: CPT | Performed by: STUDENT IN AN ORGANIZED HEALTH CARE EDUCATION/TRAINING PROGRAM

## 2024-09-11 RX ORDER — MULTIVIT WITH MINERALS/LUTEIN
TABLET ORAL
COMMUNITY

## 2024-09-11 RX ORDER — IRON BIS-GLYCINAT/VIT C/FA/B12 28-60-0.4
CAPSULE ORAL
COMMUNITY

## 2024-09-11 NOTE — PROCEDURES
Procedure: Removal of impacted cerumen using instrumentation  Description: Verbal consent was obtained. Otoscope visualized impacted cerumen in R ear canal. Lighted curette was used to remove cerumen from ear canal. Unable to completely clear cerumen.  Discharge instructions were discussed, including the use of Debrox drops to help soften wax if symptoms recur.  Patient may return in 1 to 2 weeks for repeat procedure after starting Debrox drops.

## 2024-09-11 NOTE — PROGRESS NOTES
[facility] Note  09/11/24    Chief Complaint   Patient presents with    Migraine     When she gets period.  Last 2 periods she had twitching in legs.  She had aura with the last 2 and vision got burry.     HPI:   Vanessa Gonzalez is a 34 year old female who presents for change in migraine.    Had bad fibromyalgia flare 8/26/24, started in hands then spread to the rest of the body. Tried muscle relaxer and chiropractor. Took a while to improve.    8/28/24 started period. Period ended around 9/3-9/4. Thought it was sinus and took advil cold and sinus. Thought it was tension, felt it wrap around to the midline then around the head.  After work that day stayed in bed. Tried benadryl then later tried tylenol PM.    Was having leg twitching, more on right calf and aura. Next day headache improved a little, was trying to avoid caffeine. Felt slightly dizzy or presyncopal getting off an elevator. Even sitting at desk felt leg was twitching still. Mild headache persisted.    9/8 woke up feeling run down and weak. Right ear felt clogged. Was sitting and felt presyncopal again. Then headache started to return on the left side which is a normal side for her to have a migraine. Took tylenol PM that evening, still had leg twitching into thighs, hips and hands. Tried laying on the left side and felt face was tingling/numb briefly for a few seconds.     Then took Excedrin migraine 9/10 which helped a little, but still felt weak. Felt dizzy while sitting. Had blurred vision for a few minutes then improved.    For the last few periods has had leg twitching. Patient typically gets migraine around period.    Trying different iron pills. Current one is gentle iron.     Wt Readings from Last 6 Encounters:   09/11/24 128 lb 10.2 oz (58.3 kg)   06/12/24 127 lb 3.2 oz (57.7 kg)   02/29/24 132 lb (59.9 kg)   02/19/24 132 lb 3.2 oz (60 kg)   11/09/23 127 lb 12.8 oz (58 kg)   10/27/23 127 lb 13.6 oz  (58 kg)       Past Medical History:    Abdominal pain    All over pain, now localized to upper right/mid left    Anxiety    Diagnosed with anxiety    Arthritis    Arthritic changes to upper back    Back pain    L5/S1 bulging disc, annular tear, fibromyalgia    Back problem    L5 BULGING DISC    Belching    I noticed more frequent burping but it could also just be me    Bleeding nose    Bleeding gums only brushing, improved with dental cleanings    Bloating    I always feel like I'm bloated    Blood in the stool    Enough blood to fill toilet or just when wiping    Blood in urine    Not visible blood, microscopic, no infection    Body piercing    double pierced ear lobes, daith piercing fell out recently    Change in hair    Constipation    After a lot of diarrhea, constipation became more frequent    Diarrhea, unspecified    Every hour for whole day, got better but still frequent    Dizziness    Slight dizziness when i was nauseous. Had to lay down    Fatigue    I have fibromyalgia so i am always tired    Fever    I had lowgrade 99 fever with body aches but not since then    Fibromyalgia    Frequent urination    I was peeing every hour. Seems to be better now    Frequent use of laxatives    Headache disorder    I have migraines once a month for a week at a time    Heartburn    Occassional heart burn, not severe    Hemorrhoids    Prinary care confirmed past hemrrhoids with anal exam    History of cardiac murmur    Sinus arrhythmia, normal    History of depression    Diagnosed with depression    Indigestion    I couldn't eat anything without diarrhea    Irregular bowel habits    Elaina always have irregular habits but worse now    Itch of skin    Chronic idiopathic urticaria    Leaking of urine    I will pee and then leak even after i am done going    Leg swelling    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 Stabbing pain left mid abdomen    Painful urination    There were times i had to push when i pee    Sleep disturbance    I had 2-3 weeks of insomnia, now i am sleeping normally    Stress    Uncomfortable fullness after meals    Doesn't always happen, not too much anymore    Visual impairment    GLASSES    Wears glasses    I've had glasses since i was a child, nearsighted    Weight gain    i was always hungry and would feel weak if i didn't eat    Weight loss    I lost a few lbs after being sick     Past Surgical History:   Procedure Laterality Date    Colonoscopy  01/01/2023    Removal of ovarian cyst(s)  01/2019     Allergies   Allergen Reactions    Cat Hair Extract HIVES, ITCHING and Runny nose    Duloxetine Hcl ANXIETY    Gabapentin ANXIETY, FATIGUE and OTHER (SEE COMMENTS)    Peanuts ITCHING    Diclofenac DIARRHEA and OTHER (SEE COMMENTS)    Diclofenac Sodium DIARRHEA and OTHER (SEE COMMENTS)    Lyrica INSOMNIA    Nickel RASH    Nickel RASH    Pregabalin INSOMNIA    Seasonal Runny nose and OTHER (SEE COMMENTS)     Sneezing, watery eyes      Fe Bisgly-Vit C-Vit B12-FA (GENTLE IRON) 28-60-0.008-0.4 MG Oral Cap       Ascorbic Acid (VITAMIN C) 1000 MG Oral Tab       OMEPRAZOLE 40 MG Oral Capsule Delayed Release TAKE 1 CAPSULE(40 MG) BY MOUTH DAILY 90 capsule 0    cyclobenzaprine 10 MG Oral Tab Take 1 tablet (10 mg total) by mouth nightly as needed for Muscle spasms. 30 tablet 0    Cholecalciferol (VITAMIN D) 50 MCG (2000 UT) Oral Cap Take 1 capsule (2,000 Units total) by mouth.      naproxen 500 MG Oral Tab Take 1 tablet (500 mg total) by mouth 2 (two) times daily as needed (take with food and big glass of water). 180 tablet 1    TRULANCE 3 MG Oral Tab Take 1 tablet by mouth daily. 30 tablet 5     fexofenadine 180 MG Oral Tab Take 1 tablet (180 mg total) by mouth daily.      cetirizine 10 MG Oral Tab Take 1 tablet (10 mg total) by mouth daily.      diphenhydrAMINE HCl (BENADRYL ALLERGY OR) Take by mouth.       Social History     Socioeconomic History    Marital status:    Tobacco Use    Smoking status: Never     Passive exposure: Current    Smokeless tobacco: Never    Tobacco comments:     Denied tobacco use   Vaping Use    Vaping status: Never Used   Substance and Sexual Activity    Alcohol use: Never     Comment: Rare     Drug use: Never    Sexual activity: Yes     Partners: Male     Birth control/protection: Condom   Other Topics Concern    Caffeine Concern No    Exercise No     Social Determinants of Health      [facility]     Counseling given: Not Answered  Tobacco comments: Denied tobacco use    Family History   Problem Relation Age of Onset    Allergies Father     Other (Parkinson's disease) Father     Hypertension Mother     Allergies Mother     Colon Cancer Maternal Grandmother         She had colon cancer that spread to other organs    Heart Attack Maternal Grandfather          of a heart attack, smoker    Osteoporosis Paternal Grandmother     Bipolar Disorder Sister     Psoriasis Sister     Substance Abuse Brother     Diabetes Maternal Aunt     Diabetes Paternal Uncle     Other (cancer unsure what type) Paternal Uncle      Family Status   Relation Status    Fa Alive    Mo Alive    MGMA     MGFA (Not Specified)    PGMA (Not Specified)    Sis Alive    Bro Alive    Mat Aunt (Not Specified)    Paternal Unc (Not Specified)        REVIEW OF SYSTEMS:   See HPI    EXAM:   /78   Pulse 72   Temp 97.8 °F (36.6 °C) (Temporal)   Resp 16   Ht 5' 2\" (1.575 m)   Wt 128 lb 10.2 oz (58.3 kg)   LMP 2024 (Exact Date)   BMI 23.53 kg/m²  Estimated body mass index is 23.53 kg/m² as calculated from the following:    Height as of this  encounter: 5' 2\" (1.575 m).    Weight as of this encounter: 128 lb 10.2 oz (58.3 kg).   Vital signs reviewed. Appears stated age, well groomed.  Physical Exam:  GEN:  Patient is alert and oriented x3, no apparent distress  HEAD:  Normocephalic, atraumatic, minimal maxillary sinus tenderness  HEENT:  no scleral icterus, conjunctivae clear bilaterally, EOMI, PERRLA, OP clear, right TM not visible due to cerumen impaction, left TM clear  LUNGS: clear to auscultation bilaterally, no rales/rhonchi/wheezing  HEART:  Regular rate and rhythm, normal S1/S2, no murmurs, rubs or gallops  EXTREMITIES:  Moves all extremities well, strength 5/5 b/l  NEURO:  CN 2 - 12 intact, gait normal, patellar reflexes 3+ and symmetric    ASSESSMENT AND PLAN:   1. Intractable migraine with aura without status migrainosus  2. Chronic headache with new features  3. Visual changes  4. Muscle twitch  Patient is a 32-year-old female with history of migraines who presents for change in migraine aura.  Patient noticed visual changes as well as muscle twitching around the time of migraine.  Will check CBC CMP and iron studies in addition to CT brain or head to assess for mass effect or other intracranial abnormalities.  If worsening symptoms that do not improve, advised ER evaluation.  Further recommendations pending results.  - Comp Metabolic Panel (14) [E]; Future  - CBC W Differential W Platelet [E]; Future  - Iron And Tibc; Future  - Ferritin; Future  - CT BRAIN OR HEAD (CPT=70450); Future    5. Impacted cerumen of right ear  Patient presents for impacted cerumen  - unable to completely clear  - can use debrox drops for no more than 1-2 weeks, alternatively can use 1/2 warm water with 1/2 hydrogen peroxide  - follow up as needed  - Removal Impacted Cerumen Requiring Instrumentation, Unilateral        Meds & Refills for this Visit:  Requested Prescriptions      No prescriptions requested or ordered in this encounter       Stop Taking                 Ferrous Sulfate 325 (65 Fe) MG Oral Tab    Take 1 tablet (325 mg total) by mouth daily with dinner.            Health Maintenance:  Health Maintenance Due   Topic Date Due    Annual Physical  Never done    DTaP,Tdap,and Td Vaccines (1 - Tdap) Never done    Annual Depression Screening  01/01/2024    COVID-19 Vaccine (4 - 2023-24 season) 09/01/2024       Patient/Caregiver Education: There are no barriers to learning. Medical education done.   Outcome: Patient verbalizes understanding. Patient is notified to call with any questions, complications, allergies, or worsening or changing symptoms.  Patient is to call with any side effects or complications from the treatments as a result of today.     Problem List:  Patient Active Problem List   Diagnosis    Lumbago    Well woman exam with routine gynecological exam    Screen for STD (sexually transmitted disease)    Discogenic low back pain    Encounter for surveillance of contraceptive pills    Fibromyalgia    Chronic urticaria    Positive [place]O (antinuclear antibody)    Polyarthralgia    Urinary retention    Urinary incontinence    Vitamin D deficiency    Raynaud's phenomenon without gangrene    History of hyperprolactinemia    Pituitary microadenoma (HCC)    Right upper quadrant pain    Diarrhea, unspecified    Chronic superficial gastritis without bleeding    Gastroesophageal reflux disease without esophagitis    Chronic heartburn    Abdominal bloating    Hematochezia    Chronic pain of left knee       Return if symptoms worsen or fail to improve.    Nyasia Carbajal MD  [facility]  09/11/24      Please note that portions of this note may have been completed with a voice recognition program. Efforts were made to edit the dictations but occasionally words are mis-transcribed. Thank you for your understanding.

## 2024-09-12 ENCOUNTER — PATIENT MESSAGE (OUTPATIENT)
Dept: FAMILY MEDICINE CLINIC | Facility: CLINIC | Age: 34
End: 2024-09-12

## 2024-09-12 DIAGNOSIS — R25.3 MUSCLE TWITCH: Primary | ICD-10-CM

## 2024-09-13 ENCOUNTER — HOSPITAL ENCOUNTER (OUTPATIENT)
Dept: GENERAL RADIOLOGY | Age: 34
Discharge: HOME | End: 2024-09-13
Attending: STUDENT IN AN ORGANIZED HEALTH CARE EDUCATION/TRAINING PROGRAM
Payer: COMMERCIAL

## 2024-09-13 DIAGNOSIS — R25.3 MUSCLE TWITCH: ICD-10-CM

## 2024-09-13 LAB — MAGNESIUM SERPL-MCNC: 2.2 MG/DL (ref 1.6–2.6)

## 2024-09-13 PROCEDURE — 72110 X-RAY EXAM L-2 SPINE 4/>VWS: CPT | Performed by: STUDENT IN AN ORGANIZED HEALTH CARE EDUCATION/TRAINING PROGRAM

## 2024-09-13 NOTE — TELEPHONE ENCOUNTER
Called [facility]- confirmed they can add magnesium level to recent blood draw on 9/11/24.     Informed patient we have added magnesium level to blood work.

## 2024-09-13 NOTE — TELEPHONE ENCOUNTER
Does she still have cyclobenzaprine? She could try this as needed but no driving afterwards. Avoid taking with other sedating agents. Let's also check lumbar X-ray, I will put in order. If severe pain should go to ER. Thank you.

## 2024-09-13 NOTE — TELEPHONE ENCOUNTER
From: Vanessa Gonzalez  To: Nyasia Carbajal  Sent: 9/12/2024 5:41 PM CDT  Subject: Legs twitching all day    Hello, I just wanted to document that when I had a minor headache earlier this week, my legs twitched maybe once or twice at my desk at work. Today at work, I did not have a headache, maybe mild sinus pressure, but my legs have been twitching all day on and off. Even at the chiropractor, laying down to do traction, my legs were twitching. I have noticed slight twitches in my hand and eyes, not as much as my legs but sometimes it happens. I scheduled my CT scan for 9/25th the soonest, so if you need me to let you know anything else in between, please let me know. I haven't had twitches all day like this. It's usually at night.

## 2024-09-16 ENCOUNTER — TELEPHONE (OUTPATIENT)
Dept: NEUROLOGY | Facility: CLINIC | Age: 34
End: 2024-09-16

## 2024-09-20 ENCOUNTER — MED REC SCAN ONLY (OUTPATIENT)
Dept: FAMILY MEDICINE CLINIC | Facility: CLINIC | Age: 34
End: 2024-09-20

## 2024-09-23 ENCOUNTER — OFFICE VISIT (OUTPATIENT)
Dept: FAMILY MEDICINE CLINIC | Facility: CLINIC | Age: 34
End: 2024-09-23
Payer: COMMERCIAL

## 2024-09-23 VITALS
TEMPERATURE: 97 F | HEIGHT: 62 IN | BODY MASS INDEX: 23.52 KG/M2 | SYSTOLIC BLOOD PRESSURE: 112 MMHG | RESPIRATION RATE: 16 BRPM | WEIGHT: 127.81 LBS | HEART RATE: 78 BPM | DIASTOLIC BLOOD PRESSURE: 78 MMHG

## 2024-09-23 DIAGNOSIS — R20.0 NUMBNESS AND TINGLING OF BOTH LEGS BELOW KNEES: ICD-10-CM

## 2024-09-23 DIAGNOSIS — R20.2 NUMBNESS AND TINGLING OF BOTH LEGS BELOW KNEES: ICD-10-CM

## 2024-09-23 DIAGNOSIS — R25.3 MUSCLE TWITCH: Primary | ICD-10-CM

## 2024-09-23 DIAGNOSIS — R20.2 NUMBNESS AND TINGLING IN BOTH HANDS: ICD-10-CM

## 2024-09-23 DIAGNOSIS — G43.119 INTRACTABLE MIGRAINE WITH AURA WITHOUT STATUS MIGRAINOSUS: ICD-10-CM

## 2024-09-23 DIAGNOSIS — G89.29 CHRONIC HEADACHE WITH NEW FEATURES: ICD-10-CM

## 2024-09-23 DIAGNOSIS — F41.9 ANXIETY: ICD-10-CM

## 2024-09-23 DIAGNOSIS — H53.9 VISUAL CHANGES: ICD-10-CM

## 2024-09-23 DIAGNOSIS — D50.9 IRON DEFICIENCY ANEMIA, UNSPECIFIED IRON DEFICIENCY ANEMIA TYPE: ICD-10-CM

## 2024-09-23 DIAGNOSIS — R31.29 MICROSCOPIC HEMATURIA: ICD-10-CM

## 2024-09-23 DIAGNOSIS — R51.9 CHRONIC HEADACHE WITH NEW FEATURES: ICD-10-CM

## 2024-09-23 DIAGNOSIS — R20.0 NUMBNESS AND TINGLING IN BOTH HANDS: ICD-10-CM

## 2024-09-23 RX ORDER — DIAZEPAM 2 MG
2 TABLET ORAL 2 TIMES DAILY PRN
Qty: 10 TABLET | Refills: 0 | Status: SHIPPED | OUTPATIENT
Start: 2024-09-23

## 2024-09-23 NOTE — PROGRESS NOTES
[facility] Note  09/23/24    No chief complaint on file.    HPI:   Vanessa Gonzalez is a 34 year old female who presents for follow up after ER visit.    The patient presents for a recheck after ER visit for complaints of muscle twitching. Was seen 9/14/24 days ago. CT head was negative for acute changes. EKG was unrevealing for symptoms.    Her hands were stuck and a doctor asked for 911 for possible seizure like activity, but patient felt frozen, had difficulty speaking.    Still having twitching daily, different parts of her body. Worse when at rest. Depends on the day. Some days are worse than others. Worse in the afternoon and evening.    Feels \"swimmy\" now since the ER visit. Denies overt weakness. But had tingling in hands and feet which was new.     Headache improving overall. Has one in the ER, has one today. Took excedrin last week at work and it caused anxiety.    Anxiety is heightened now. No falls or tripping.    Patient has concern about Parkinson's disease due to her father having Parkinson's.    Rubbing eyes more often. Vision is okay now but was abnormal with the headache.     MRI 9/14/24:  Elyse Martinez,  - 09/14/2024   Formatting of this note might be different from the original.     EXAM: CT HEAD WO IV CONTRAST     COMPARISON: 3/1/2021     CLINICAL INDICATION: Headache     TECHNIQUE: Contiguous axial images of the brain were obtained without the administration of contrast. Imaging was also reformatted in the sagittal and coronal planes.     Automated exposure control and/or iterative reconstruction techniques were utilized as radiation dose reduction strategies on this patient.     FINDINGS: No hydrocephalus. Gray/white matter interface is preserved. No acute Intra or extra-axial hemorrhage, mass effect, or midline shift. Basilar cisterns are patent.   Calvarium is intact. Petrous and mastoid air cells are clear.   IMPRESSION: Screening  noncontrast brain CT shows no acute intracranial abnormality.       Wt Readings from Last 6 Encounters:   09/23/24 127 lb 12.8 oz (58 kg)   09/13/24 128 lb (58.1 kg)   09/11/24 128 lb 10.2 oz (58.3 kg)   06/12/24 127 lb 3.2 oz (57.7 kg)   02/29/24 132 lb (59.9 kg)   02/19/24 132 lb 3.2 oz (60 kg)       Past Medical History:    Abdominal pain    All over pain, now localized to upper right/mid left    Anxiety    Diagnosed with anxiety    Arthritis    Arthritic changes to upper back    Back pain    L5/S1 bulging disc, annular tear, fibromyalgia    Back problem    L5 BULGING DISC    Belching    I noticed more frequent burping but it could also just be me    Bleeding nose    Bleeding gums only brushing, improved with dental cleanings    Bloating    I always feel like I'm bloated    Blood in the stool    Enough blood to fill toilet or just when wiping    Blood in urine    Not visible blood, microscopic, no infection    Body piercing    double pierced ear lobes, daith piercing fell out recently    Change in hair    Constipation    After a lot of diarrhea, constipation became more frequent    Diarrhea, unspecified    Every hour for whole day, got better but still frequent    Dizziness    Slight dizziness when i was nauseous. Had to lay down    Fatigue    I have fibromyalgia so i am always tired    Fever    I had lowgrade 99 fever with body aches but not since then    Fibromyalgia    Frequent urination    I was peeing every hour. Seems to be better now    Frequent use of laxatives    Headache disorder    I have migraines once a month for a week at a time    Heartburn    Occassional heart burn, not severe    Hemorrhoids    Prinary care confirmed past hemrrhoids with anal exam    History of cardiac murmur    Sinus arrhythmia, normal    History of depression    Diagnosed with depression    Indigestion    I couldn't eat anything without diarrhea    Irregular bowel habits    Elaina always have irregular habits but worse now    Itch  of skin    Chronic idiopathic urticaria    Leaking of urine    I will pee and then leak even after i am done going    Leg swelling    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 Stabbing pain left mid abdomen    Painful urination    There were times i had to push when i pee    Sleep disturbance    I had 2-3 weeks of insomnia, now i am sleeping normally    Stress    Uncomfortable fullness after meals    Doesn't always happen, not too much anymore    Visual impairment    GLASSES    Wears glasses    I've had glasses since i was a child, nearsighted    Weight gain    i was always hungry and would feel weak if i didn't eat    Weight loss    I lost a few lbs after being sick     Past Surgical History:   Procedure Laterality Date    Colonoscopy  01/01/2023    Removal of ovarian cyst(s)  01/2019     Allergies   Allergen Reactions    Cat Hair Extract HIVES, ITCHING and Runny nose    Duloxetine Hcl ANXIETY    Gabapentin ANXIETY, FATIGUE and OTHER (SEE COMMENTS)    Peanuts ITCHING    Diclofenac DIARRHEA and OTHER (SEE COMMENTS)    Diclofenac Sodium DIARRHEA and OTHER (SEE COMMENTS)    Lyrica INSOMNIA    Nickel RASH    Nickel RASH    Pregabalin INSOMNIA    Seasonal Runny nose and OTHER (SEE COMMENTS)     Sneezing, watery eyes      diazePAM 2 MG Oral Tab Take 1 tablet (2 mg total) by mouth 2 (two) times daily as needed (muscle spasm). Also - Take one tablet 60 minutes prior to procedure, may take and additional one tablet in 30 minutes if needed due to incomplete response. 10 tablet 0    Fe Bisgly-Vit C-Vit B12-FA (GENTLE IRON) 28-60-0.008-0.4 MG Oral Cap       Ascorbic Acid (VITAMIN C) 1000 MG Oral  Tab       OMEPRAZOLE 40 MG Oral Capsule Delayed Release TAKE 1 CAPSULE(40 MG) BY MOUTH DAILY 90 capsule 0    cyclobenzaprine 10 MG Oral Tab Take 1 tablet (10 mg total) by mouth nightly as needed for Muscle spasms. 30 tablet 0    Cholecalciferol (VITAMIN D) 50 MCG (2000 UT) Oral Cap Take 1 capsule (2,000 Units total) by mouth.      naproxen 500 MG Oral Tab Take 1 tablet (500 mg total) by mouth 2 (two) times daily as needed (take with food and big glass of water). 180 tablet 1    TRULANCE 3 MG Oral Tab Take 1 tablet by mouth daily. 30 tablet 5    fexofenadine 180 MG Oral Tab Take 1 tablet (180 mg total) by mouth daily.      cetirizine 10 MG Oral Tab Take 1 tablet (10 mg total) by mouth daily.      diphenhydrAMINE HCl (BENADRYL ALLERGY OR) Take by mouth.       Social History     Socioeconomic History    Marital status:    Tobacco Use    Smoking status: Never     Passive exposure: Current    Smokeless tobacco: Never    Tobacco comments:     Denied tobacco use   Vaping Use    Vaping status: Never Used   Substance and Sexual Activity    Alcohol use: Never     Comment: Rare     Drug use: Never    Sexual activity: Yes     Partners: Male     Birth control/protection: Condom   Other Topics Concern    Caffeine Concern No    Exercise No     Social Determinants of Health      [facility]     Counseling given: Not Answered  Tobacco comments: Denied tobacco use    Family History   Problem Relation Age of Onset    Allergies Father     Other (Parkinson's disease) Father     Hypertension Mother     Allergies Mother     Colon Cancer Maternal Grandmother         She had colon cancer that spread to other organs    Heart Attack Maternal Grandfather          of a heart attack, smoker    Osteoporosis Paternal Grandmother     Bipolar Disorder Sister     Psoriasis Sister     Substance Abuse Brother     Diabetes Maternal Aunt     Diabetes Paternal Uncle     Other (cancer unsure what type)  Paternal Uncle      Family Status   Relation Status    Fa Alive    Mo Alive    MGMA     MGFA (Not Specified)    PGMA (Not Specified)    Sis Alive    Bro Alive    Mat Aunt (Not Specified)    Paternal Unc (Not Specified)        REVIEW OF SYSTEMS:   See HPI    EXAM:   /78   Pulse 78   Temp 97.4 °F (36.3 °C) (Temporal)   Resp 16   Ht 5' 2\" (1.575 m)   Wt 127 lb 12.8 oz (58 kg)   LMP 2024 (Exact Date)   BMI 23.37 kg/m²  Estimated body mass index is 23.37 kg/m² as calculated from the following:    Height as of this encounter: 5' 2\" (1.575 m).    Weight as of this encounter: 127 lb 12.8 oz (58 kg).   Vital signs reviewed. Appears stated age, well groomed.  Physical Exam:  GEN:  Patient is alert and oriented x3, no apparent distress  HEAD:  Normocephalic, atraumatic  HEENT:  Eyes: EOMI, PERRLA, no scleral icterus, conjunctivae clear bilaterally.  Ears: right TM with impacted cerumen, left TM clear. Throat:  No tonsillar erythema or exudate.  Mouth:  No oral lesions or ulcerations, no dental abnormalities noted.  LUNGS: clear to auscultation bilaterally, no rales/rhonchi/wheezing  HEART:  Regular rate and rhythm, normal S1/S2, no murmurs, rubs or gallops  EXTREMITIES:  Strength intact with 5/5 bilaterally upper and lower extremities, no edema noted  NEURO:  CN 2 - 12 intact, gait normal, 2+ biceps reflex b/l, 2+ brachioradialis reflex b/l, 3+ patellar reflex b/l    ASSESSMENT AND PLAN:   1. Muscle twitch  Patient presents for follow up of new onset muscle twitches, chronic headache with new features, visual changes, migraines. Differential diagnosis includes seizure like activity, seizure, tremor. Will check MRI brain to evaluate further. Trial of valium to use prior to MRI to reduce tremor. Try first at home, avoid driving or operating machinery while taking this medication, must have someone to drive patient to and from MRI if valium is used prior. Keep upcoming appointment with neurology,  appreciate evaluation and recommendations  - MRI BRAIN (CPT=70551); Future  - diazePAM 2 MG Oral Tab; Take 1 tablet (2 mg total) by mouth 2 (two) times daily as needed (muscle spasm). Also - Take one tablet 60 minutes prior to procedure, may take and additional one tablet in 30 minutes if needed due to incomplete response.  Dispense: 10 tablet; Refill: 0    2. Chronic headache with new features  - MRI BRAIN (CPT=70551); Future    3. Intractable migraine with aura without status migrainosus  - MRI BRAIN (CPT=70551); Future    4. Visual changes  - MRI BRAIN (CPT=70551); Future    5. Numbness and tingling in both hands  - MRI BRAIN (CPT=70551); Future    6. Numbness and tingling of both legs below knees  - MRI BRAIN (CPT=70551); Future    7. Anxiety  Patient has increased anxiety due to recent health changes. Will continue to monitor.    8. Microscopic hematuria  Patient had blood in urine on outside labs, will recheck UA in the future.  - UA/M With Culture Reflex [E]; Future    9. Iron deficiency anemia, unspecified iron deficiency anemia type  Due to recheck CBC in 3 months.   - CBC W Differential W Platelet [E]; Future        Meds & Refills for this Visit:  Requested Prescriptions     Signed Prescriptions Disp Refills    diazePAM 2 MG Oral Tab 10 tablet 0     Sig: Take 1 tablet (2 mg total) by mouth 2 (two) times daily as needed (muscle spasm). Also - Take one tablet 60 minutes prior to procedure, may take and additional one tablet in 30 minutes if needed due to incomplete response.       Start Taking               diazePAM 2 MG Oral Tab Take 1 tablet (2 mg total) by mouth 2 (two) times daily as needed (muscle spasm). Also - Take one tablet 60 minutes prior to procedure, may take and additional one tablet in 30 minutes if needed due to incomplete response.            Health Maintenance:  Health Maintenance Due   Topic Date Due    Annual Physical  Never done    DTaP,Tdap,and Td Vaccines (1 - Tdap) Never done     Annual Depression Screening  01/01/2024    COVID-19 Vaccine (4 - 2023-24 season) 09/01/2024       Patient/Caregiver Education: There are no barriers to learning. Medical education done.   Outcome: Patient verbalizes understanding. Patient is notified to call with any questions, complications, allergies, or worsening or changing symptoms.  Patient is to call with any side effects or complications from the treatments as a result of today.     Problem List:  Patient Active Problem List   Diagnosis    Lumbago    Well woman exam with routine gynecological exam    Screen for STD (sexually transmitted disease)    Discogenic low back pain    Encounter for surveillance of contraceptive pills    Fibromyalgia    Chronic urticaria    Positive [place] (antinuclear antibody)    Polyarthralgia    Urinary retention    Urinary incontinence    Vitamin D deficiency    Raynaud's phenomenon without gangrene    History of hyperprolactinemia    Pituitary microadenoma (HCC)    Right upper quadrant pain    Diarrhea, unspecified    Chronic superficial gastritis without bleeding    Gastroesophageal reflux disease without esophagitis    Chronic heartburn    Abdominal bloating    Hematochezia    Chronic pain of left knee       Return for pending results.    Nyasia Carbajal MD  [facility]  09/23/24      Please note that portions of this note may have been completed with a voice recognition program. Efforts were made to edit the dictations but occasionally words are mis-transcribed. Thank you for your understanding.

## 2024-09-27 ENCOUNTER — PATIENT MESSAGE (OUTPATIENT)
Dept: FAMILY MEDICINE CLINIC | Facility: CLINIC | Age: 34
End: 2024-09-27

## 2024-09-27 NOTE — TELEPHONE ENCOUNTER
From: Vanessa Gonzalez  To: Nyasia Carbajal  Sent: 9/27/2024 9:20 AM CDT  Subject: October 21st appointment    Good morning! I am sorry, I didn't remember there was an appointment scheduled for 10/21 at 2:30. I have had some brain fog, so I apologize if we made this appointment at my last visit and didn't remember. I just wanted to make sure if I needed to come in so I can request the time off for work. Thank you so much!

## 2024-10-03 ENCOUNTER — IMMUNIZATION (OUTPATIENT)
Dept: LAB | Age: 34
End: 2024-10-03
Attending: EMERGENCY MEDICINE
Payer: COMMERCIAL

## 2024-10-03 DIAGNOSIS — Z23 NEED FOR VACCINATION: Primary | ICD-10-CM

## 2024-10-03 PROCEDURE — 90480 ADMN SARSCOV2 VAC 1/ONLY CMP: CPT

## 2024-10-16 ENCOUNTER — HOSPITAL ENCOUNTER (OUTPATIENT)
Dept: MRI IMAGING | Facility: HOSPITAL | Age: 34
Discharge: HOME | End: 2024-10-16
Attending: STUDENT IN AN ORGANIZED HEALTH CARE EDUCATION/TRAINING PROGRAM
Payer: COMMERCIAL

## 2024-10-16 DIAGNOSIS — R20.2 NUMBNESS AND TINGLING OF BOTH LEGS BELOW KNEES: ICD-10-CM

## 2024-10-16 DIAGNOSIS — H53.9 VISUAL CHANGES: ICD-10-CM

## 2024-10-16 DIAGNOSIS — R25.3 MUSCLE TWITCH: ICD-10-CM

## 2024-10-16 DIAGNOSIS — G43.119 INTRACTABLE MIGRAINE WITH AURA WITHOUT STATUS MIGRAINOSUS: ICD-10-CM

## 2024-10-16 DIAGNOSIS — G89.29 CHRONIC HEADACHE WITH NEW FEATURES: ICD-10-CM

## 2024-10-16 DIAGNOSIS — R51.9 CHRONIC HEADACHE WITH NEW FEATURES: ICD-10-CM

## 2024-10-16 DIAGNOSIS — R20.0 NUMBNESS AND TINGLING IN BOTH HANDS: ICD-10-CM

## 2024-10-16 DIAGNOSIS — R20.2 NUMBNESS AND TINGLING IN BOTH HANDS: ICD-10-CM

## 2024-10-16 DIAGNOSIS — R20.0 NUMBNESS AND TINGLING OF BOTH LEGS BELOW KNEES: ICD-10-CM

## 2024-10-16 PROCEDURE — 70551 MRI BRAIN STEM W/O DYE: CPT | Performed by: STUDENT IN AN ORGANIZED HEALTH CARE EDUCATION/TRAINING PROGRAM

## 2024-10-17 ENCOUNTER — PATIENT MESSAGE (OUTPATIENT)
Dept: FAMILY MEDICINE CLINIC | Facility: CLINIC | Age: 34
End: 2024-10-17

## 2024-10-21 ENCOUNTER — OFFICE VISIT (OUTPATIENT)
Dept: FAMILY MEDICINE CLINIC | Facility: CLINIC | Age: 34
End: 2024-10-21
Payer: COMMERCIAL

## 2024-10-21 VITALS
HEART RATE: 84 BPM | RESPIRATION RATE: 16 BRPM | TEMPERATURE: 97 F | BODY MASS INDEX: 23.85 KG/M2 | WEIGHT: 129.63 LBS | DIASTOLIC BLOOD PRESSURE: 70 MMHG | SYSTOLIC BLOOD PRESSURE: 110 MMHG | HEIGHT: 62 IN

## 2024-10-21 DIAGNOSIS — H61.21 IMPACTED CERUMEN, RIGHT EAR: ICD-10-CM

## 2024-10-21 DIAGNOSIS — R25.3 MUSCLE TWITCHING: ICD-10-CM

## 2024-10-21 DIAGNOSIS — H65.112 ACUTE ALLERGIC SEROUS OTITIS MEDIA OF LEFT EAR: ICD-10-CM

## 2024-10-21 DIAGNOSIS — G89.29 CHRONIC HEADACHE WITH NEW FEATURES: ICD-10-CM

## 2024-10-21 DIAGNOSIS — J01.00 ACUTE NON-RECURRENT MAXILLARY SINUSITIS: Primary | ICD-10-CM

## 2024-10-21 DIAGNOSIS — R51.9 CHRONIC HEADACHE WITH NEW FEATURES: ICD-10-CM

## 2024-10-21 DIAGNOSIS — J34.89 POSTERIOR RHINORRHEA: ICD-10-CM

## 2024-10-21 PROCEDURE — 99214 OFFICE O/P EST MOD 30 MIN: CPT | Performed by: STUDENT IN AN ORGANIZED HEALTH CARE EDUCATION/TRAINING PROGRAM

## 2024-10-21 NOTE — PROGRESS NOTES
[facility] Family [facility] Note  10/21/24    Chief Complaint   Patient presents with    Hospital F/U    Post Nasal Drip     HPI:   Vanessa Gonzalez is a 34 year old female who presents for postnasal drip.    Patient has MRI brain which showed normal findings. Twitching has slowed down but it still occurs. The day she went to her MRI  had twitching. Tried diazepam and it did help prior to the MRI.     After last period there as a longer headache spell. Patient's last menstrual period was 09/28/2024 (exact date).     Patient is having postnasal drip. Tried flonase for two weeks then stopped using it. Then symptoms started again. Boss had strep, patient was diagnosed with postnasal drip, not strep. Did not notice much of a difference with flonase this most recent time. On Sunday used saltwater to gargle. Did debrox for right ear for a few days. Right ear hurts.    The postnasal drip has been intermittent. Taking allergra and zyrtec.    Sometimes heartburn. Not worsening or consistent at this time. Taking omeprazole daily to protect the stomach.      Wt Readings from Last 6 Encounters:   10/21/24 129 lb 9.6 oz (58.8 kg)   09/23/24 127 lb 12.8 oz (58 kg)   09/13/24 128 lb (58.1 kg)   09/11/24 128 lb 10.2 oz (58.3 kg)   06/12/24 127 lb 3.2 oz (57.7 kg)   02/29/24 132 lb (59.9 kg)       Past Medical History:    Abdominal pain    All over pain, now localized to upper right/mid left    Anxiety    Diagnosed with anxiety    Arthritis    Arthritic changes to upper back    Back pain    L5/S1 bulging disc, annular tear, fibromyalgia    Back problem    L5 BULGING DISC    Belching    I noticed more frequent burping but it could also just be me    Bleeding nose    Bleeding gums only brushing, improved with dental cleanings    Bloating    I always feel like I'm bloated    Blood in the stool    Enough blood to fill toilet or just when wiping    Blood in urine    Not visible blood, microscopic, no infection     Body piercing    double pierced ear lobes, daith piercing fell out recently    Change in hair    Constipation    After a lot of diarrhea, constipation became more frequent    Diarrhea, unspecified    Every hour for whole day, got better but still frequent    Dizziness    Slight dizziness when i was nauseous. Had to lay down    Fatigue    I have fibromyalgia so i am always tired    Fever    I had lowgrade 99 fever with body aches but not since then    Fibromyalgia    Frequent urination    I was peeing every hour. Seems to be better now    Frequent use of laxatives    Headache disorder    I have migraines once a month for a week at a time    Heartburn    Occassional heart burn, not severe    Hemorrhoids    Prinary care confirmed past hemrrhoids with anal exam    History of cardiac murmur    Sinus arrhythmia, normal    History of depression    Diagnosed with depression    Indigestion    I couldn't eat anything without diarrhea    Irregular bowel habits    Elaina always have irregular habits but worse now    Itch of skin    Chronic idiopathic urticaria    Leaking of urine    I will pee and then leak even after i am done going    Leg swelling    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 Stabbing pain left mid abdomen    Painful urination    There were times i had to push when i pee    Sleep disturbance    I had 2-3 weeks of insomnia, now i am sleeping normally    Stress    Uncomfortable fullness after meals    Doesn't always happen, not too much anymore    Visual impairment    GLASSES    Wears glasses    I've had glasses since i was a child, nearsighted    Weight gain    i was always hungry  and would feel weak if i didn't eat    Weight loss    I lost a few lbs after being sick     Past Surgical History:   Procedure Laterality Date    Colonoscopy  01/01/2023    Removal of ovarian cyst(s)  01/2019     Allergies[1]   amoxicillin clavulanate 875-125 MG Oral Tab Take 1 tablet by mouth 2 (two) times daily for 10 days. 20 tablet 0    Fe Bisgly-Vit C-Vit B12-FA (GENTLE IRON) 28-60-0.008-0.4 MG Oral Cap       Ascorbic Acid (VITAMIN C) 1000 MG Oral Tab       OMEPRAZOLE 40 MG Oral Capsule Delayed Release TAKE 1 CAPSULE(40 MG) BY MOUTH DAILY 90 capsule 0    Cholecalciferol (VITAMIN D) 50 MCG (2000 UT) Oral Cap Take 1 capsule (2,000 Units total) by mouth.      naproxen 500 MG Oral Tab Take 1 tablet (500 mg total) by mouth 2 (two) times daily as needed (take with food and big glass of water). 180 tablet 1    TRULANCE 3 MG Oral Tab Take 1 tablet by mouth daily. 30 tablet 5    fexofenadine 180 MG Oral Tab Take 1 tablet (180 mg total) by mouth daily.      cetirizine 10 MG Oral Tab Take 1 tablet (10 mg total) by mouth daily.      diphenhydrAMINE HCl (BENADRYL ALLERGY OR) Take by mouth.       Social History     Socioeconomic History    Marital status:    Tobacco Use    Smoking status: Never     Passive exposure: Current    Smokeless tobacco: Never    Tobacco comments:     Denied tobacco use   Vaping Use    Vaping status: Never Used   Substance and Sexual Activity    Alcohol use: Never     Comment: Rare     Drug use: Never    Sexual activity: Yes     Partners: Male     Birth control/protection: Condom   Other Topics Concern    Caffeine Concern No    Exercise No     Social Drivers of Health      Received from eMar, eMar    [facility]     Counseling given: Not Answered  Tobacco comments: Denied tobacco use    Family History   Problem Relation Age of Onset    Allergies Father     Other (Parkinson's disease) Father     Hypertension Mother     Allergies Mother     Colon Cancer Maternal Grandmother          She had colon cancer that spread to other organs    Heart Attack Maternal Grandfather          of a heart attack, smoker    Osteoporosis Paternal Grandmother     Bipolar Disorder Sister     Psoriasis Sister     Substance Abuse Brother     Diabetes Maternal Aunt     Diabetes Paternal Uncle     Other (cancer unsure what type) Paternal Uncle      Family Status   Relation Status    Fa Alive    Mo Alive    MGMA     MGFA (Not Specified)    PGMA (Not Specified)    Sis Alive    Bro Alive    Mat Aunt (Not Specified)    Paternal Unc (Not Specified)        REVIEW OF SYSTEMS:   See HPI    EXAM:   /70   Pulse 84   Temp 97.3 °F (36.3 °C) (Temporal)   Resp 16   Ht 5' 2\" (1.575 m)   Wt 129 lb 9.6 oz (58.8 kg)   LMP 2024 (Exact Date)   BMI 23.70 kg/m²  Estimated body mass index is 23.7 kg/m² as calculated from the following:    Height as of this encounter: 5' 2\" (1.575 m).    Weight as of this encounter: 129 lb 9.6 oz (58.8 kg).   Vital signs reviewed. Appears stated age, well groomed.  Physical Exam:  GEN:  Patient is alert and oriented x3, no apparent distress  HEAD:  Normocephalic, atraumatic. Maxillary sinus tenderness L>R  HEENT:  no scleral icterus, conjunctivae clear bilaterally, EOMI, PERRLA, OP clear, right ear with impacted cerumen, left TM with erythema and air fluid level  LUNGS: clear to auscultation bilaterally, no rales/rhonchi/wheezing  HEART:  Regular rate and rhythm, normal S1/S2, no murmurs, rubs or gallops  EXTREMITIES:  Moves all extremities well  NEURO:  CN 2 - 12 grossly intact, gait normal      ASSESSMENT AND PLAN:   1. Acute non-recurrent maxillary sinusitis  2. Posterior rhinorrhea  3. Acute allergic serous otitis media of left ear  Patient presents for sinusitis and otitis media  - will treat with augmentin 875mg BID x10 days  - take with food plenty of water  - Eat yogurt or take probiotic while on antibiotics for gut health.   - can use flonase in addition to zyrtec  -  follow up if worsening or no improvement  - amoxicillin clavulanate 875-125 MG Oral Tab; Take 1 tablet by mouth 2 (two) times daily for 10 days.  Dispense: 20 tablet; Refill: 0    4. Impacted cerumen, right ear  Can use debrox drops to soften wax for 1-2 weeks then follow up if needed    5. Chronic headache with new features  6. Muscle twitching  MRI brain normal, has upcoming appointment with neurology, appreciate evaluation and recommendations          Meds & Refills for this Visit:  Requested Prescriptions     Signed Prescriptions Disp Refills    amoxicillin clavulanate 875-125 MG Oral Tab 20 tablet 0     Sig: Take 1 tablet by mouth 2 (two) times daily for 10 days.       Start Taking               amoxicillin clavulanate 875-125 MG Oral Tab Take 1 tablet by mouth 2 (two) times daily for 10 days.          Stop Taking                diazePAM 2 MG Oral Tab    Take 1 tablet (2 mg total) by mouth 2 (two) times daily as needed (muscle spasm). Also - Take one tablet 60 minutes prior to procedure, may take and additional one tablet in 30 minutes if needed due to incomplete response.            Health Maintenance:  Health Maintenance Due   Topic Date Due    Annual Physical  Never done    DTaP,Tdap,and Td Vaccines (1 - Tdap) Never done    Annual Depression Screening  01/01/2024    Influenza Vaccine (1) 10/01/2024       Patient/Caregiver Education: There are no barriers to learning. Medical education done.   Outcome: Patient verbalizes understanding. Patient is notified to call with any questions, complications, allergies, or worsening or changing symptoms.  Patient is to call with any side effects or complications from the treatments as a result of today.     Problem List:  Patient Active Problem List   Diagnosis    Lumbago    Well woman exam with routine gynecological exam    Screen for STD (sexually transmitted disease)    Discogenic low back pain    Encounter for surveillance of contraceptive pills    Fibromyalgia     Chronic urticaria    Positive ORLANDO (antinuclear antibody)    Polyarthralgia    Urinary retention    Urinary incontinence    Vitamin D deficiency    Raynaud's phenomenon without gangrene    History of hyperprolactinemia    Pituitary microadenoma (HCC)    Right upper quadrant pain    Diarrhea, unspecified    Chronic superficial gastritis without bleeding    Gastroesophageal reflux disease without esophagitis    Chronic heartburn    Abdominal bloating    Hematochezia    Chronic pain of left knee       Return if symptoms worsen or fail to improve.    Nyasia Carbajal MD  [facility]  10/21/24      Please note that portions of this note may have been completed with a voice recognition program. Efforts were made to edit the dictations but occasionally words are mis-transcribed. Thank you for your understanding.           [1]   Allergies  Allergen Reactions    Cat Hair Extract HIVES, ITCHING and Runny nose    Duloxetine Hcl ANXIETY    Gabapentin ANXIETY, FATIGUE and OTHER (SEE COMMENTS)    Peanuts ITCHING    Diclofenac DIARRHEA and OTHER (SEE COMMENTS)    Diclofenac Sodium DIARRHEA and OTHER (SEE COMMENTS)    Lyrica INSOMNIA    Nickel RASH    Nickel RASH    Pregabalin INSOMNIA    Seasonal Runny nose and OTHER (SEE COMMENTS)     Sneezing, watery eyes

## 2024-10-28 ENCOUNTER — OFFICE VISIT (OUTPATIENT)
Dept: NEUROLOGY | Facility: CLINIC | Age: 34
End: 2024-10-28
Payer: COMMERCIAL

## 2024-10-28 VITALS
HEART RATE: 104 BPM | SYSTOLIC BLOOD PRESSURE: 120 MMHG | DIASTOLIC BLOOD PRESSURE: 70 MMHG | BODY MASS INDEX: 24 KG/M2 | WEIGHT: 131 LBS | RESPIRATION RATE: 16 BRPM

## 2024-10-28 DIAGNOSIS — R29.90 MULTIPLE NEUROLOGICAL SYMPTOMS: Primary | ICD-10-CM

## 2024-10-28 DIAGNOSIS — G43.109 MIGRAINE WITH AURA AND WITHOUT STATUS MIGRAINOSUS, NOT INTRACTABLE: ICD-10-CM

## 2024-10-28 DIAGNOSIS — R25.3 MUSCLE TWITCHING: ICD-10-CM

## 2024-10-28 DIAGNOSIS — R25.8 INVOLUNTARY JERKY MOVEMENTS: ICD-10-CM

## 2024-10-28 DIAGNOSIS — M79.7 FIBROMYALGIA: ICD-10-CM

## 2024-10-28 PROCEDURE — 99204 OFFICE O/P NEW MOD 45 MIN: CPT | Performed by: OTHER

## 2024-10-28 RX ORDER — DULOXETIN HYDROCHLORIDE 20 MG/1
20 CAPSULE, DELAYED RELEASE ORAL DAILY
Qty: 30 CAPSULE | Refills: 1 | Status: SHIPPED | OUTPATIENT
Start: 2024-10-28

## 2024-10-28 NOTE — PROGRESS NOTES
Patient states muscle twitching started in 08/2024, patient states the twitching started in BLE and has moved to whole body. Patient states this occurs on and off, mostly with increase stress or with migraines. Patient states migraines occur once a month with menstrual cycle, lasting a week or a week and half. Patient states aura with migraines, migraines are mostly in temporal region or a headband headache. Patient states some numbness in the face with migraines.

## 2024-10-28 NOTE — PATIENT INSTRUCTIONS
Refill policies:    Allow 2-3 business days for refills; controlled substances may take longer.  Contact your pharmacy at least 5 days prior to running out of medication and have them send an electronic request or submit request through the “request refill” option in your [employer] account.  Refills are not addressed on weekends; covering physicians do not authorize routine medications on weekends.  No narcotics or controlled substances are refilled after noon on Fridays or by on call physicians.  By law, narcotics must be electronically prescribed.  A 30 day supply with no refills is the maximum allowed.  If your prescription is due for a refill, you may be due for a follow up appointment.  To best provide you care, patients receiving routine medications need to be seen at least once a year.  Patients receiving narcotic/controlled substance medications need to be seen at least once every 3 months.  In the event that your preferred pharmacy does not have the requested medication in stock (e.g. Backordered), it is your responsibility to find another pharmacy that has the requested medication available.  We will gladly send a new prescription to that pharmacy at your request.    Scheduling Tests:    If your physician has ordered radiology tests such as MRI or CT scans, please contact Central Scheduling at [phone] right away to schedule the test.  Once scheduled, the [facility] Centralized Referral Team will work with your insurance carrier to obtain pre-certification or prior authorization.  Depending on your insurance carrier, approval may take 3-10 days.  It is highly recommended patients assure they have received an authorization before having a test performed.  If test is done without insurance authorization, patient may be responsible for the entire amount billed.      Precertification and Prior Authorizations:  If your physician has recommended that you have a procedure or additional  testing performed the [facility] Centralized Referral Team will contact your insurance carrier to obtain pre-certification or prior authorization.    You are strongly encouraged to contact your insurance carrier to verify that your procedure/test has been approved and is a COVERED benefit.  Although the [facility] Centralized Referral Team does its due diligence, the insurance carrier gives the disclaimer that \"Although the procedure is authorized, this does not guarantee payment.\"    Ultimately the patient is responsible for payment.   Thank you for your understanding in this matter.  Paperwork Completion:  If you require FMLA or disability paperwork for your recovery, please make sure to either drop it off or have it faxed to our office at [phone]. Be sure the form has your name and date of birth on it.  The form will be faxed to our Forms Department and they will complete it for you.  There is a 25$ fee for all forms that need to be filled out.  Please be aware there is a 10-14 day turnaround time.  You will need to sign a release of information (STUART) form if your paperwork does not come with one.  You may call the Forms Department with any questions at [phone].  Their fax number is [phone].

## 2024-10-29 NOTE — PROGRESS NOTES
HPI:    Patient ID: Vanessa Gonzalez is a 34 year old female.    Neurologic Problem  Associated symptoms include headaches.   Migraine   Pertinent negatives include no numbness.     Vanessa is a 34 year old female with history of anxiety, fibromyalgia, migraines who presents for evaluation of multiple neurological symptoms.  reports since August 2024 started having twitching, first started in the right foot/leg and then moved to left LE and to whole body. States symptoms began around the time of her menstrual period. A week before that also had fibromyalgia flare up with pain in her hands and generalized body aches. She is not on any medications for fibromyalgia, tried Gabapentin and Pregabalin in the past but was not able to tolerate.      On 9/14/2024 she was at [employer] and didn't felt well, dizzy/lightheaded, started hyperventilating, bilateral facial tingling, cramps in hand/carpopedal spasm and upper body forward jerking. She was awake and alert all the time. No history of seizures.  EMS was called and brought to the [facility], lab work including CK and electrolytes was fine. She was diagnosed with anxiety attack.   continues to have intermittent twitching and involuntary upper body tics since then.     She also experiences periodic migraines with aura and increased twitching. She had one episode of blurry vision that lasted a few minutes but hasn't recurred. Had MRI brain wo contrast through her PCP on 10/17/2024 which was negative for acute abnormality.  Has long standing history of anxiety, sees a therapist once a month.          HISTORY:  Past Medical History:    Abdominal pain    All over pain, now localized to upper right/mid left    Anxiety    Diagnosed with anxiety    Arthritis    Arthritic changes to upper back    Back pain    L5/S1 bulging disc, annular tear, fibromyalgia    Back problem    L5 BULGING DISC    Belching    I noticed more frequent burping but it could also  just be me    Bleeding nose    Bleeding gums only brushing, improved with dental cleanings    Bloating    I always feel like I'm bloated    Blood in the stool    Enough blood to fill toilet or just when wiping    Blood in urine    Not visible blood, microscopic, no infection    Body piercing    double pierced ear lobes, daith piercing fell out recently    Change in hair    Constipation    After a lot of diarrhea, constipation became more frequent    Diarrhea, unspecified    Every hour for whole day, got better but still frequent    Dizziness    Slight dizziness when i was nauseous. Had to lay down    Fatigue    I have fibromyalgia so i am always tired    Fever    I had lowgrade 99 fever with body aches but not since then    Fibromyalgia    Frequent urination    I was peeing every hour. Seems to be better now    Frequent use of laxatives    Headache disorder    I have migraines once a month for a week at a time    Heartburn    Occassional heart burn, not severe    Hemorrhoids    Prinary care confirmed past hemrrhoids with anal exam    History of cardiac murmur    Sinus arrhythmia, normal    History of depression    Diagnosed with depression    Indigestion    I couldn't eat anything without diarrhea    Irregular bowel habits    Elaina always have irregular habits but worse now    Itch of skin    Chronic idiopathic urticaria    Leaking of urine    I will pee and then leak even after i am done going    Leg swelling    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 Stabbing pain left mid abdomen    Painful urination    There were times i had to push when i pee    Sleep disturbance    I had 2-3  weeks of insomnia, now i am sleeping normally    Stress    Uncomfortable fullness after meals    Doesn't always happen, not too much anymore    Visual impairment    GLASSES    Wears glasses    I've had glasses since i was a child, nearsighted    Weight gain    i was always hungry and would feel weak if i didn't eat    Weight loss    I lost a few lbs after being sick      Past Surgical History:   Procedure Laterality Date    Colonoscopy  2023    Removal of ovarian cyst(s)  2019      Family History   Problem Relation Age of Onset    Allergies Father     Other (Parkinson's disease) Father     Hypertension Mother     Allergies Mother     Colon Cancer Maternal Grandmother         She had colon cancer that spread to other organs    Heart Attack Maternal Grandfather          of a heart attack, smoker    Osteoporosis Paternal Grandmother     Bipolar Disorder Sister     Psoriasis Sister     Substance Abuse Brother     Diabetes Maternal Aunt     Diabetes Paternal Uncle     Other (cancer unsure what type) Paternal Uncle       Social History     Socioeconomic History    Marital status:    Tobacco Use    Smoking status: Never     Passive exposure: Current    Smokeless tobacco: Never    Tobacco comments:     Denied tobacco use   Vaping Use    Vaping status: Never Used   Substance and Sexual Activity    Alcohol use: Never     Comment: Rare     Drug use: Never    Sexual activity: Yes     Partners: Male     Birth control/protection: Condom   Other Topics Concern    Caffeine Concern Yes     Comment: soda occ    Exercise No     Social Drivers of Health      [facility]        Review of Systems   Constitutional: Negative.    HENT: Negative.     Eyes: Negative.    Respiratory: Negative.     Cardiovascular: Negative.    Gastrointestinal: Negative.    Endocrine: Negative.    Genitourinary: Negative.    Musculoskeletal: Negative.    Allergic/Immunologic: Negative.     Neurological:  Positive for tremors and headaches. Negative for numbness.        + tingling bilateral face   Hematological: Negative.    Psychiatric/Behavioral: Negative.     All other systems reviewed and are negative.           Current Outpatient Medications   Medication Sig Dispense Refill    DULoxetine 20 MG Oral Cap DR Particles Take 1 capsule (20 mg total) by mouth daily. 30 capsule 1    amoxicillin clavulanate 875-125 MG Oral Tab Take 1 tablet by mouth 2 (two) times daily for 10 days. 20 tablet 0    Fe Bisgly-Vit C-Vit B12-FA (GENTLE IRON) 28-60-0.008-0.4 MG Oral Cap       Ascorbic Acid (VITAMIN C) 1000 MG Oral Tab       OMEPRAZOLE 40 MG Oral Capsule Delayed Release TAKE 1 CAPSULE(40 MG) BY MOUTH DAILY 90 capsule 0    cyclobenzaprine 10 MG Oral Tab Take 1 tablet (10 mg total) by mouth nightly as needed for Muscle spasms. 30 tablet 0    Cholecalciferol (VITAMIN D) 50 MCG (2000 UT) Oral Cap Take 1 capsule (2,000 Units total) by mouth.      naproxen 500 MG Oral Tab Take 1 tablet (500 mg total) by mouth 2 (two) times daily as needed (take with food and big glass of water). 180 tablet 1    TRULANCE 3 MG Oral Tab Take 1 tablet by mouth daily. (Patient taking differently: Take 1 tablet by mouth daily.) 30 tablet 5    fexofenadine 180 MG Oral Tab Take 1 tablet (180 mg total) by mouth daily.      cetirizine 10 MG Oral Tab Take 1 tablet (10 mg total) by mouth daily.      diphenhydrAMINE HCl (BENADRYL ALLERGY OR) Take by mouth.       Allergies:Allergies[1]  PHYSICAL EXAM:   Physical Exam  Blood pressure 120/70, pulse 104, resp. rate 16, weight 131 lb (59.4 kg), last menstrual period 09/28/2024, not currently breastfeeding.    General Appearance: Well nourished, well developed, no apparent distress.     HEENT: Normocephalic and atraumatic. Normal sclera.   Cardiovascular: Normal rate, regular rhythm and normal heart sounds.    Pulmonary/Chest: Effort normal and breath sounds normal.   Abdominal: Soft. Bowel sounds are  normal.   Skin: dry, clean and intact  Ext: peripheral pulses present  Psych: normal mood and affect    Neurological:  Patient is awake, alert and oriented to person, place and time   Normal memory, attention/concentration, speech and language.    Cranial Nerves:   II: Visual acuity: normal  III: Pupils: equal, round, reactive to light  III,IV,VI: Extra Ocular Movements: intact  V: Facial sensation: intact  VII: Facial strength: intact  VIII: Hearing: intact  IX: Palate: intact  XI: Shoulder shrug: intact  XII: Tongue movement: normal    Motor: Normal tone. Strength is  5 out of 5 in all extremities bilaterally.  DTR: present and mildly brisk throughout    Sensory: Sensory examination is normal to light touch and pinprick     Coordination: Finger-to-nose normal bilaterally without evidence of dysmetria.    Gait: normal casual gait     TESTS/IMAGING:     MRI brain: 10/17/2024  The ventricles and sulci are within normal limits.  There is no midline shift or mass effect.  The basal cisterns are patent.       There is no acute intracranial hemorrhage or extra-axial fluid collection identified.  There is no parenchymal signal abnormality identified.  There is no restricted diffusion to suggest acute ischemia/infarction.      The visualized paranasal sinuses and mastoid air cells are unremarkable.  The expected major intracranial flow voids are present.           Impression   CONCLUSION:  No acute intracranial abnormality is identified.          ASSESSMENT/PLAN:     Encounter Diagnoses   Name Primary?    Multiple neurological symptoms Yes    Involuntary jerky movements     Muscle twitching     Migraine with aura and without status migrainosus, not intractable     Fibromyalgia        Vanessa is a 34 year old female with history of anxiety, fibromyalgia and migraines presenting with multiple neurological symptoms predominantly intermittent twitching and involuntary jerky movements     Symptoms likely related to underlying  anxiety. Neurological examination unremarkable  MRI brain obtained by PCP was negative for acute abnormality    Obtain EEG to assess seizure activity    We recommend starting Duloxetine or Venlafaxine for anxiety, Duloxetine will also help fibromyalgia  Start Duloxetine 20 mg daily. Side effect explained  Continue psychotherapy    Take Magnesium supplements for migraine prevention and for twitching/cramps  Use Excedrin as needed for migraine. Will consider triptans if needed    Follow up in about 2-3 months      No orders of the defined types were placed in this encounter.      Thank you for allowing us to participate in your patient's care.      Wilfrido Freire MD  [facility] [place]            Meds This Visit:  Requested Prescriptions     Signed Prescriptions Disp Refills    DULoxetine 20 MG Oral Cap DR Particles 30 capsule 1     Sig: Take 1 capsule (20 mg total) by mouth daily.       Imaging & Referrals:  None     ID#[id number]         [1]   Allergies  Allergen Reactions    Cat Hair Extract HIVES, ITCHING and Runny nose    Gabapentin ANXIETY, FATIGUE and OTHER (SEE COMMENTS)    Peanuts ITCHING    Diclofenac DIARRHEA and OTHER (SEE COMMENTS)    Diclofenac Sodium DIARRHEA and OTHER (SEE COMMENTS)    Lyrica INSOMNIA    Nickel RASH    Nickel RASH    Pregabalin INSOMNIA    Seasonal Runny nose and OTHER (SEE COMMENTS)     Sneezing, watery eyes

## 2024-11-05 ENCOUNTER — NURSE ONLY (OUTPATIENT)
Dept: ELECTROPHYSIOLOGY | Facility: HOSPITAL | Age: 34
End: 2024-11-05
Attending: Other
Payer: COMMERCIAL

## 2024-11-05 DIAGNOSIS — R25.8 INVOLUNTARY JERKY MOVEMENTS: ICD-10-CM

## 2024-11-05 PROCEDURE — 95816 EEG AWAKE AND DROWSY: CPT

## 2024-11-07 ENCOUNTER — PROCEDURE VISIT (OUTPATIENT)
Dept: NEUROLOGY | Facility: CLINIC | Age: 34
End: 2024-11-07

## 2024-11-07 DIAGNOSIS — R25.8 INVOLUNTARY JERKY MOVEMENTS: Primary | ICD-10-CM

## 2024-11-07 NOTE — PROCEDURES
ELECTROENCEPHALOGRAM REPORT      Patient Name: Vanessa Gonzalez  Chart ID: [id number]  Ordering Physician: Wilfrido Freire MD Date of Test:  11/5/2024  Patient Diagnosis:   Encounter Diagnosis   Name Primary?    Involuntary jerky movements Yes       Vanessa is a 34 year old female with history of anxiety, fibromyalgia, migraines who presents for evaluation of multiple neurological symptoms. States reports since August 2024 started having twitching, first started in the right foot/leg and then moved to left LE and to whole body. States symptoms began around the time of her menstrual period. A week before that also had fibromyalgia flare up with pain in her hands and generalized body aches.   She is not on any medications for fibromyalgia, tried Gabapentin and Pregabalin in the past but was not able to tolerate.   Meds: Duloxetine, Omeprazole, Cyclobenzaprine      TECHNICAL SUMMARY  1. 10-20 International System.  2.  Bipolar and monopolar recording.  3.  Routine recording (awake and asleep).  4.  Portable - C.E.S.  5.  Impedance - 10 kilohms or less.    A routine EEG was obtained.  No sedation was given.    BACKGROUND:   Awake: During wakefulness a well developed, reactive, posterior dominant rhythm consisting of 8-9 hertz potentials of medium amplitude is seen symmetrically.   Intermittent lead and muscle artifact noted    Sleep: not demonstrated    EPILEPTIFORM ABNORMALITIES:  There is no epileptiform activity seen in the interpretable portion      ACTIVATION PROCEDURES:   Hyperventilation and photic stimulation did not induced any abnormal responses      IMPRESSION:   This is technically limited but overall normal  EEG study. No epileptiform abnormalities seen. This however does not completely exclude a seizure disorder.  Clinical correlation is advised.      Thank you for allowing us to participate in your patient's care.      Wilfrido Freire MD  [facility] [place]

## 2024-11-08 ENCOUNTER — TELEPHONE (OUTPATIENT)
Dept: NEUROLOGY | Facility: CLINIC | Age: 34
End: 2024-11-08

## 2024-11-08 NOTE — TELEPHONE ENCOUNTER
Message  Received: Yesterday  Wilfrido Freire MD Ghanayem, Kathryn M., MD; MIRACLE Rosenthal Nurse  EEG was negative for any epileptic activity. Thanks!    Results sent to patient via [employer] message .

## 2024-12-05 ENCOUNTER — OFFICE VISIT (OUTPATIENT)
Facility: CLINIC | Age: 34
End: 2024-12-05
Payer: COMMERCIAL

## 2024-12-05 VITALS
HEART RATE: 85 BPM | BODY MASS INDEX: 24 KG/M2 | DIASTOLIC BLOOD PRESSURE: 70 MMHG | SYSTOLIC BLOOD PRESSURE: 120 MMHG | WEIGHT: 130 LBS

## 2024-12-05 DIAGNOSIS — N92.6 IRREGULAR BLEEDING: Primary | ICD-10-CM

## 2024-12-05 LAB
CONTROL LINE PRESENT WITH A CLEAR BACKGROUND (YES/NO): YES YES/NO
KIT LOT #: NORMAL NUMERIC
PREGNANCY TEST, URINE: NEGATIVE

## 2024-12-05 PROCEDURE — 81025 URINE PREGNANCY TEST: CPT

## 2024-12-05 PROCEDURE — 99212 OFFICE O/P EST SF 10 MIN: CPT

## 2024-12-05 RX ORDER — FERROUS SULFATE 325(65) MG
325 TABLET, DELAYED RELEASE (ENTERIC COATED) ORAL
COMMUNITY

## 2024-12-06 NOTE — PROGRESS NOTES
Vanessa Gonzalez is a 34 year old female  Patient's last menstrual period was 2024 (exact date).   Chief Complaint   Patient presents with    Gyn Problem     Pt c/o of her last 2 period been light with clots.   .  Previously her periods were every 28-29 days, light flow.   But the last two menses cycle, it has been light flow and had small size clots.     OBSTETRICS HISTORY:  OB History    Para Term  AB Living   0 0 0 0 0 0   SAB IAB Ectopic Multiple Live Births   0 0 0 0 0       GYNE HISTORY:  Periods regular monthly    History   Sexual Activity    Sexual activity: Yes    Partners: Male    Birth control/ protection: Condom        Pap Date: 10/27/23  Pap Result Notes: neg/neg        MEDICAL HISTORY:  Past Medical History:    Abdominal pain    All over pain, now localized to upper right/mid left    Anxiety    Diagnosed with anxiety    Arthritis    Arthritic changes to upper back    Back pain    L5/S1 bulging disc, annular tear, fibromyalgia    Back problem    L5 BULGING DISC    Belching    I noticed more frequent burping but it could also just be me    Bleeding nose    Bleeding gums only brushing, improved with dental cleanings    Bloating    I always feel like I'm bloated    Blood in the stool    Enough blood to fill toilet or just when wiping    Blood in urine    Not visible blood, microscopic, no infection    Body piercing    double pierced ear lobes, daith piercing fell out recently    Change in hair    Constipation    After a lot of diarrhea, constipation became more frequent    Diarrhea, unspecified    Every hour for whole day, got better but still frequent    Dizziness    Slight dizziness when i was nauseous. Had to lay down    Fatigue    I have fibromyalgia so i am always tired    Fever    I had lowgrade 99 fever with body aches but not since then    Fibromyalgia    Frequent urination    I was peeing every hour. Seems to be better now    Frequent use of laxatives     Headache disorder    I have migraines once a month for a week at a time    Heartburn    Occassional heart burn, not severe    Hemorrhoids    Prinary care confirmed past hemrrhoids with anal exam    History of cardiac murmur    Sinus arrhythmia, normal    History of depression    Diagnosed with depression    Indigestion    I couldn't eat anything without diarrhea    Irregular bowel habits    Elaina always have irregular habits but worse now    Itch of skin    Chronic idiopathic urticaria    Leaking of urine    I will pee and then leak even after i am done going    Leg swelling    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 Stabbing pain left mid abdomen    Painful urination    There were times i had to push when i pee    Sleep disturbance    I had 2-3 weeks of insomnia, now i am sleeping normally    Stress    Uncomfortable fullness after meals    Doesn't always happen, not too much anymore    Visual impairment    GLASSES    Wears glasses    I've had glasses since i was a child, nearsighted    Weight gain    i was always hungry and would feel weak if i didn't eat    Weight loss    I lost a few lbs after being sick       SURGICAL HISTORY:  Past Surgical History:   Procedure Laterality Date    Colonoscopy  01/01/2023    Removal of ovarian cyst(s)  01/2019       SOCIAL HISTORY:  Social History     Socioeconomic History    Marital status:      Spouse name: Not on file    Number of children: Not on file    Years of education: Not on file    Highest education level: Not on file   Occupational History    Not on file   Tobacco Use    Smoking status: Never     Passive exposure: Current     Smokeless tobacco: Never    Tobacco comments:     Denied tobacco use   Vaping Use    Vaping status: Never Used   Substance and Sexual Activity    Alcohol use: Never     Comment: Rare     Drug use: Never    Sexual activity: Yes     Partners: Male     Birth control/protection: Condom   Other Topics Concern     Service Not Asked    Blood Transfusions Not Asked    Caffeine Concern Yes     Comment: soda occ    Occupational Exposure Not Asked    Hobby Hazards Not Asked    Sleep Concern Not Asked    Stress Concern Not Asked    Weight Concern Not Asked    Special Diet Not Asked    Back Care Not Asked    Exercise No    Bike Helmet Not Asked    Seat Belt Not Asked    Self-Exams Not Asked   Social History Narrative    Not on file     Social Drivers of Health     Financial Resource Strain: Not on file   Food Insecurity: Not on file   Transportation Needs: Not on file   Physical Activity: Not on file   Stress: Not on file   Social Connections: Not on file   Housing Stability: At Risk (2023)    [facility] [employer]     Living Situation: Not on file     Housing Problems: Not on file       FAMILY HISTORY:  Family History   Problem Relation Age of Onset    Allergies Father     Other (Parkinson's disease) Father     Hypertension Mother     Allergies Mother     Colon Cancer Maternal Grandmother         She had colon cancer that spread to other organs    Heart Attack Maternal Grandfather          of a heart attack, smoker    Osteoporosis Paternal Grandmother     Bipolar Disorder Sister     Psoriasis Sister     Substance Abuse Brother     Diabetes Maternal Aunt     Diabetes Paternal Uncle     Other (cancer unsure what type) Paternal Uncle        MEDICATIONS:    Current Outpatient Medications:     ferrous sulfate 325 (65 FE) MG Oral Tab EC, Take 1 tablet (325 mg total) by mouth daily with breakfast., Disp: , Rfl:     DULoxetine 20 MG Oral Cap DR Particles, Take 1 capsule (20 mg total)  by mouth daily. (Patient not taking: Reported on 12/5/2024), Disp: 30 capsule, Rfl: 1    Fe Bisgly-Vit C-Vit B12-FA (GENTLE IRON) 28-60-0.008-0.4 MG Oral Cap, , Disp: , Rfl:     Ascorbic Acid (VITAMIN C) 1000 MG Oral Tab, , Disp: , Rfl:     OMEPRAZOLE 40 MG Oral Capsule Delayed Release, TAKE 1 CAPSULE(40 MG) BY MOUTH DAILY, Disp: 90 capsule, Rfl: 0    cyclobenzaprine 10 MG Oral Tab, Take 1 tablet (10 mg total) by mouth nightly as needed for Muscle spasms. (Patient not taking: Reported on 12/5/2024), Disp: 30 tablet, Rfl: 0    Cholecalciferol (VITAMIN D) 50 MCG (2000 UT) Oral Cap, Take 1 capsule (2,000 Units total) by mouth., Disp: , Rfl:     naproxen 500 MG Oral Tab, Take 1 tablet (500 mg total) by mouth 2 (two) times daily as needed (take with food and big glass of water). (Patient not taking: Reported on 12/5/2024), Disp: 180 tablet, Rfl: 1    TRULANCE 3 MG Oral Tab, Take 1 tablet by mouth daily., Disp: 30 tablet, Rfl: 5    fexofenadine 180 MG Oral Tab, Take 1 tablet (180 mg total) by mouth daily. (Patient not taking: Reported on 12/5/2024), Disp: , Rfl:     cetirizine 10 MG Oral Tab, Take 1 tablet (10 mg total) by mouth daily., Disp: , Rfl:     diphenhydrAMINE HCl (BENADRYL ALLERGY OR), Take by mouth. (Patient not taking: Reported on 12/5/2024), Disp: , Rfl:     ALLERGIES:  Allergies[1]             Assessment & Plan:    . Irregular bleeding    - Urine Preg Test [41333]    Discussed w/pt to continue to monitor her periods. If she is passing large clots and has to change her pad every hour to call the office             [1]   Allergies  Allergen Reactions    Cat Hair Extract HIVES, ITCHING and Runny nose    Gabapentin ANXIETY, FATIGUE and OTHER (SEE COMMENTS)    Peanuts ITCHING    Diclofenac DIARRHEA and OTHER (SEE COMMENTS)    Diclofenac Sodium DIARRHEA and OTHER (SEE COMMENTS)    Lyrica INSOMNIA    Nickel RASH    Nickel RASH    Pregabalin INSOMNIA    Seasonal Runny nose and OTHER (SEE COMMENTS)     Sneezing,  watery eyes

## 2024-12-08 DIAGNOSIS — R10.13 EPIGASTRIC PAIN: ICD-10-CM

## 2024-12-09 RX ORDER — OMEPRAZOLE 40 MG/1
40 CAPSULE, DELAYED RELEASE ORAL DAILY
Qty: 90 CAPSULE | Refills: 0 | Status: SHIPPED | OUTPATIENT
Start: 2024-12-09

## 2024-12-25 DIAGNOSIS — M79.7 FIBROMYALGIA: Primary | ICD-10-CM

## 2024-12-27 RX ORDER — DULOXETIN HYDROCHLORIDE 20 MG/1
20 CAPSULE, DELAYED RELEASE ORAL DAILY
Qty: 30 CAPSULE | Refills: 1 | Status: SHIPPED | OUTPATIENT
Start: 2024-12-27 | End: 2024-12-30

## 2024-12-27 NOTE — TELEPHONE ENCOUNTER
Medication: DULOXETINE 20 MG Oral Cap DR Particles      Date of last refill: 10/28/2024 (#30/1)-discon  [place]     Last office visit: 10/28/2024  Due back to clinic per last office note:  Around 12/28/2024  Date next office visit scheduled:    Future Appointments   Date Time Provider Department Center   12/30/2024  3:30 PM Wilfrido Freire MD [facility]           Last OV note recommendation:    ASSESSMENT/PLAN:           Encounter Diagnoses   Name Primary?    Multiple neurological symptoms Yes    Involuntary jerky movements      Muscle twitching      Migraine with aura and without status migrainosus, not intractable      Fibromyalgia           Vanessa is a 34 year old female with history of anxiety, fibromyalgia and migraines presenting with multiple neurological symptoms predominantly intermittent twitching and involuntary jerky movements      Symptoms likely related to underlying anxiety. Neurological examination unremarkable  MRI brain obtained by PCP was negative for acute abnormality     Obtain EEG to assess seizure activity     We recommend starting Duloxetine or Venlafaxine for anxiety, Duloxetine will also help fibromyalgia  Start Duloxetine 20 mg daily. Side effect explained  Continue psychotherapy     Take Magnesium supplements for migraine prevention and for twitching/cramps  Use Excedrin as needed for migraine. Will consider triptans if needed     Follow up in about 2-3 months        No orders of the defined types were placed in this encounter.        Thank you for allowing us to participate in your patient's care.

## 2024-12-30 ENCOUNTER — OFFICE VISIT (OUTPATIENT)
Dept: NEUROLOGY | Facility: CLINIC | Age: 34
End: 2024-12-30
Payer: COMMERCIAL

## 2024-12-30 VITALS
SYSTOLIC BLOOD PRESSURE: 120 MMHG | RESPIRATION RATE: 16 BRPM | OXYGEN SATURATION: 97 % | WEIGHT: 132 LBS | BODY MASS INDEX: 24.29 KG/M2 | HEART RATE: 109 BPM | DIASTOLIC BLOOD PRESSURE: 70 MMHG | HEIGHT: 62 IN

## 2024-12-30 DIAGNOSIS — R29.90 MULTIPLE NEUROLOGICAL SYMPTOMS: ICD-10-CM

## 2024-12-30 DIAGNOSIS — R25.8 INVOLUNTARY JERKY MOVEMENTS: Primary | ICD-10-CM

## 2024-12-30 DIAGNOSIS — M79.7 FIBROMYALGIA: ICD-10-CM

## 2024-12-30 PROCEDURE — 99213 OFFICE O/P EST LOW 20 MIN: CPT | Performed by: OTHER

## 2024-12-30 RX ORDER — DULOXETIN HYDROCHLORIDE 20 MG/1
20 CAPSULE, DELAYED RELEASE ORAL DAILY
Qty: 30 CAPSULE | Refills: 5 | Status: SHIPPED | OUTPATIENT
Start: 2024-12-30

## 2024-12-30 NOTE — PROGRESS NOTES
HPI:    Patient ID: Vanessa Gonzalez is a 34 year old female.    Neurologic Problem  Pertinent negatives include no headaches.   Migraine   Pertinent negatives include no numbness.       Patient returns to the clinic for follow up.  States since last seen symptoms have improved and Duloxetine working well  EEG obtained ruled out seizure activity          Vanessa is a 34 year old female with history of anxiety, fibromyalgia, migraines who presents for evaluation of multiple neurological symptoms. States reports since August 2024 started having twitching, first started in the right foot/leg and then moved to left LE and to whole body. States symptoms began around the time of her menstrual period. A week before that also had fibromyalgia flare up with pain in her hands and generalized body aches. She is not on any medications for fibromyalgia, tried Gabapentin and Pregabalin in the past but was not able to tolerate.      On 9/14/2024 she was at [employer] and didn't felt well, dizzy/lightheaded, started hyperventilating, bilateral facial tingling, cramps in hand/carpopedal spasm and upper body forward jerking. She was awake and alert all the time. No history of seizures.  EMS was called and brought to the [place] ED, lab work including CK and electrolytes was fine. She was diagnosed with anxiety attack.   continues to have intermittent twitching and involuntary upper body tics since then.     She also experiences periodic migraines with aura and increased twitching. She had one episode of blurry vision that lasted a few minutes but hasn't recurred. Had MRI brain wo contrast through her PCP on 10/17/2024 which was negative for acute abnormality.  Has long standing history of anxiety, sees a therapist once a month.          HISTORY:  Past Medical History:    Abdominal pain    All over pain, now localized to upper right/mid left    Anxiety    Diagnosed with anxiety    Arthritis    Arthritic changes to  upper back    Back pain    L5/S1 bulging disc, annular tear, fibromyalgia    Back problem    L5 BULGING DISC    Belching    I noticed more frequent burping but it could also just be me    Bleeding nose    Bleeding gums only brushing, improved with dental cleanings    Bloating    I always feel like I'm bloated    Blood in the stool    Enough blood to fill toilet or just when wiping    Blood in urine    Not visible blood, microscopic, no infection    Body piercing    double pierced ear lobes, daith piercing fell out recently    Change in hair    Constipation    After a lot of diarrhea, constipation became more frequent    Diarrhea, unspecified    Every hour for whole day, got better but still frequent    Dizziness    Slight dizziness when i was nauseous. Had to lay down    Fatigue    I have fibromyalgia so i am always tired    Fever    I had lowgrade 99 fever with body aches but not since then    Fibromyalgia    Frequent urination    I was peeing every hour. Seems to be better now    Frequent use of laxatives    Headache disorder    I have migraines once a month for a week at a time    Heartburn    Occassional heart burn, not severe    Hemorrhoids    Prinary care confirmed past hemrrhoids with anal exam    History of cardiac murmur    Sinus arrhythmia, normal    History of depression    Diagnosed with depression    Indigestion    I couldn't eat anything without diarrhea    Irregular bowel habits    Elaina always have irregular habits but worse now    Itch of skin    Chronic idiopathic urticaria    Leaking of urine    I will pee and then leak even after i am done going    Leg swelling    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 Stabbing pain left mid abdomen    Painful urination    There were times i had to push when i pee    Sleep disturbance    I had 2-3 weeks of insomnia, now i am sleeping normally    Stress    Uncomfortable fullness after meals    Doesn't always happen, not too much anymore    Visual impairment    GLASSES    Wears glasses    I've had glasses since i was a child, nearsighted    Weight gain    i was always hungry and would feel weak if i didn't eat    Weight loss    I lost a few lbs after being sick      Past Surgical History:   Procedure Laterality Date    Colonoscopy  2023    Removal of ovarian cyst(s)  2019      Family History   Problem Relation Age of Onset    Allergies Father     Other (Parkinson's disease) Father     Hypertension Mother     Allergies Mother     Colon Cancer Maternal Grandmother         She had colon cancer that spread to other organs    Heart Attack Maternal Grandfather          of a heart attack, smoker    Osteoporosis Paternal Grandmother     Bipolar Disorder Sister     Psoriasis Sister     Substance Abuse Brother     Diabetes Maternal Aunt     Diabetes Paternal Uncle     Other (cancer unsure what type) Paternal Uncle       Social History     Socioeconomic History    Marital status:    Tobacco Use    Smoking status: Never     Passive exposure: Current    Smokeless tobacco: Never    Tobacco comments:     Denied tobacco use   Vaping Use    Vaping status: Never Used   Substance and Sexual Activity    Alcohol use: Never     Comment: Rare     Drug use: Never    Sexual activity: Yes     Partners: Male     Birth control/protection: Condom   Other Topics Concern    Caffeine Concern Yes     Comment: soda occ    Exercise No     Social Drivers of Health      Received from [employer], [employer]    [facility]        Review of Systems   Constitutional: Negative.    HENT: Negative.     Eyes: Negative.    Respiratory: Negative.     Cardiovascular:  Negative.    Gastrointestinal: Negative.    Endocrine: Negative.    Genitourinary: Negative.    Musculoskeletal: Negative.    Allergic/Immunologic: Negative.    Neurological:  Positive for tremors. Negative for numbness and headaches.        + tingling bilateral face   Hematological: Negative.    Psychiatric/Behavioral: Negative.     All other systems reviewed and are negative.           Current Outpatient Medications   Medication Sig Dispense Refill    DULoxetine 20 MG Oral Cap DR Particles Take 1 capsule (20 mg total) by mouth daily. 30 capsule 5    OMEPRAZOLE 40 MG Oral Capsule Delayed Release TAKE 1 CAPSULE(40 MG) BY MOUTH DAILY 90 capsule 0    Ascorbic Acid (VITAMIN C) 1000 MG Oral Tab       cyclobenzaprine 10 MG Oral Tab Take 1 tablet (10 mg total) by mouth nightly as needed for Muscle spasms. 30 tablet 0    Cholecalciferol (VITAMIN D) 50 MCG (2000 UT) Oral Cap Take 1 capsule (2,000 Units total) by mouth.      naproxen 500 MG Oral Tab Take 1 tablet (500 mg total) by mouth 2 (two) times daily as needed (take with food and big glass of water). 180 tablet 1    TRULANCE 3 MG Oral Tab Take 1 tablet by mouth daily. 30 tablet 5    fexofenadine 180 MG Oral Tab Take 1 tablet (180 mg total) by mouth daily.      cetirizine 10 MG Oral Tab Take 1 tablet (10 mg total) by mouth daily.       Allergies:Allergies[1]  PHYSICAL EXAM:   Physical Exam  Blood pressure 120/70, pulse 109, resp. rate 16, height 62\", weight 132 lb (59.9 kg), last menstrual period 12/03/2024, SpO2 97%, not currently breastfeeding.    General Appearance: Well nourished, well developed, no apparent distress.     HEENT: Normocephalic and atraumatic. Normal sclera.   Cardiovascular: Normal rate, regular rhythm and normal heart sounds.    Pulmonary/Chest: Effort normal and breath sounds normal.   Abdominal: Soft. Bowel sounds are normal.   Skin: dry, clean and intact  Ext: peripheral pulses present  Psych: normal mood and affect    Neurological:  Patient is  awake, alert and oriented to person, place and time   Normal memory, attention/concentration, speech and language.    Cranial Nerves:   II: Visual acuity: normal  III: Pupils: equal, round, reactive to light  III,IV,VI: Extra Ocular Movements: intact  V: Facial sensation: intact  VII: Facial strength: intact  VIII: Hearing: intact  IX: Palate: intact  XI: Shoulder shrug: intact  XII: Tongue movement: normal    Motor: Normal tone. Strength is  5 out of 5 in all extremities bilaterally.  DTR: present and mildly brisk throughout    Sensory: Sensory examination is normal to light touch and pinprick     Coordination: Finger-to-nose normal bilaterally without evidence of dysmetria.    Gait: normal casual gait     TESTS/IMAGING:     MRI brain: 10/17/2024  The ventricles and sulci are within normal limits.  There is no midline shift or mass effect.  The basal cisterns are patent.       There is no acute intracranial hemorrhage or extra-axial fluid collection identified.  There is no parenchymal signal abnormality identified.  There is no restricted diffusion to suggest acute ischemia/infarction.      The visualized paranasal sinuses and mastoid air cells are unremarkable.  The expected major intracranial flow voids are present.           Impression   CONCLUSION:  No acute intracranial abnormality is identified.          ASSESSMENT/PLAN:     Encounter Diagnoses   Name Primary?    Involuntary jerky movements Yes    Multiple neurological symptoms     Fibromyalgia        Vanessa is a 34 year old female with history of anxiety, fibromyalgia and migraines presenting with multiple neurological symptoms predominantly intermittent twitching and involuntary jerky movements     Symptoms likely related to underlying anxiety.   MRI brain obtained by PCP was negative for acute abnormality  EEG was negative for seizure activity    Continue Duloxetine 20 mg daily    Continue psychotherapy    Take Magnesium supplements for migraine  prevention and for twitching/cramps  Use Excedrin as needed for migraine. Will consider triptans if needed    Follow up in about 6 months      No orders of the defined types were placed in this encounter.      Thank you for allowing us to participate in your patient's care.      Wilfrido Freire MD  [facility] [place]            Meds This Visit:  Requested Prescriptions     Signed Prescriptions Disp Refills    DULoxetine 20 MG Oral Cap DR Particles 30 capsule 5     Sig: Take 1 capsule (20 mg total) by mouth daily.       Imaging & Referrals:  None     ID#[id number]         [1]   Allergies  Allergen Reactions    Cat Hair Extract HIVES, ITCHING and Runny nose    Gabapentin ANXIETY, FATIGUE and OTHER (SEE COMMENTS)    Peanuts ITCHING    Diclofenac DIARRHEA and OTHER (SEE COMMENTS)    Diclofenac Sodium DIARRHEA and OTHER (SEE COMMENTS)    Lyrica INSOMNIA    Nickel RASH    Nickel RASH    Pregabalin INSOMNIA    Seasonal Runny nose and OTHER (SEE COMMENTS)     Sneezing, watery eyes

## 2024-12-30 NOTE — PATIENT INSTRUCTIONS
Refill policies:    Allow 2-3 business days for refills; controlled substances may take longer.  Contact your pharmacy at least 5 days prior to running out of medication and have them send an electronic request or submit request through the “request refill” option in your [employer] account.  Refills are not addressed on weekends; covering physicians do not authorize routine medications on weekends.  No narcotics or controlled substances are refilled after noon on Fridays or by on call physicians.  By law, narcotics must be electronically prescribed.  A 30 day supply with no refills is the maximum allowed.  If your prescription is due for a refill, you may be due for a follow up appointment.  To best provide you care, patients receiving routine medications need to be seen at least once a year.  Patients receiving narcotic/controlled substance medications need to be seen at least once every 3 months.  In the event that your preferred pharmacy does not have the requested medication in stock (e.g. Backordered), it is your responsibility to find another pharmacy that has the requested medication available.  We will gladly send a new prescription to that pharmacy at your request.    Scheduling Tests:    If your physician has ordered radiology tests such as MRI or CT scans, please contact Central Scheduling at [phone] right away to schedule the test.  Once scheduled, the [facility] Centralized Referral Team will work with your insurance carrier to obtain pre-certification or prior authorization.  Depending on your insurance carrier, approval may take 3-10 days.  It is highly recommended patients assure they have received an authorization before having a test performed.  If test is done without insurance authorization, patient may be responsible for the entire amount billed.      Precertification and Prior Authorizations:  If your physician has recommended that you have a procedure or additional testing performed the [facility]  Centralized Referral Team will contact your insurance carrier to obtain pre-certification or prior authorization.    You are strongly encouraged to contact your insurance carrier to verify that your procedure/test has been approved and is a COVERED benefit.  Although the [facility] Centralized Referral Team does its due diligence, the insurance carrier gives the disclaimer that \"Although the procedure is authorized, this does not guarantee payment.\"    Ultimately the patient is responsible for payment.   Thank you for your understanding in this matter.  Paperwork Completion:  If you require FMLA or disability paperwork for your recovery, please make sure to either drop it off or have it faxed to our office at [phone]. Be sure the form has your name and date of birth on it.  The form will be faxed to our Forms Department and they will complete it for you.  There is a 25$ fee for all forms that need to be filled out.  Please be aware there is a 10-14 day turnaround time.  You will need to sign a release of information (STUART) form if your paperwork does not come with one.  You may call the Forms Department with any questions at [phone].  Their fax number is [phone].

## 2024-12-30 NOTE — PROGRESS NOTES
Patient following up today  Patient stated the Duloxetine is working well for her she feels as if the migraines aren't to bad.    yes

## 2025-02-13 ENCOUNTER — PATIENT MESSAGE (OUTPATIENT)
Dept: FAMILY MEDICINE CLINIC | Facility: CLINIC | Age: 35
End: 2025-02-13

## 2025-02-13 NOTE — TELEPHONE ENCOUNTER
The hemoglobin is improved, this is great to see. Thank you for sharing the results. Hope things are going better now.

## 2025-02-20 ENCOUNTER — OFFICE VISIT (OUTPATIENT)
Dept: FAMILY MEDICINE CLINIC | Facility: CLINIC | Age: 35
End: 2025-02-20
Payer: COMMERCIAL

## 2025-02-20 ENCOUNTER — LAB ENCOUNTER (OUTPATIENT)
Dept: LAB | Age: 35
End: 2025-02-20
Attending: STUDENT IN AN ORGANIZED HEALTH CARE EDUCATION/TRAINING PROGRAM
Payer: COMMERCIAL

## 2025-02-20 VITALS
DIASTOLIC BLOOD PRESSURE: 78 MMHG | WEIGHT: 128 LBS | RESPIRATION RATE: 18 BRPM | BODY MASS INDEX: 23.55 KG/M2 | HEIGHT: 62 IN | HEART RATE: 92 BPM | TEMPERATURE: 98 F | SYSTOLIC BLOOD PRESSURE: 112 MMHG

## 2025-02-20 DIAGNOSIS — R23.3 EASY BRUISING: ICD-10-CM

## 2025-02-20 DIAGNOSIS — Z83.2 FAMILY HISTORY OF ANTIPHOSPHOLIPID SYNDROME: ICD-10-CM

## 2025-02-20 DIAGNOSIS — L29.9 ITCHING: ICD-10-CM

## 2025-02-20 DIAGNOSIS — R79.1 ABNORMAL PROTHROMBIN TIME (PT): ICD-10-CM

## 2025-02-20 DIAGNOSIS — R79.1 ABNORMAL PROTHROMBIN TIME (PT): Primary | ICD-10-CM

## 2025-02-20 LAB
APTT PPP: 29.5 SECONDS (ref 23–36)
BILIRUB UR QL STRIP.AUTO: NEGATIVE
CLARITY UR REFRACT.AUTO: CLEAR
COLOR UR AUTO: COLORLESS
GLUCOSE UR STRIP.AUTO-MCNC: NORMAL MG/DL
INR BLD: 1.01 (ref 0.8–1.2)
KETONES UR STRIP.AUTO-MCNC: NEGATIVE MG/DL
LEUKOCYTE ESTERASE UR QL STRIP.AUTO: NEGATIVE
NITRITE UR QL STRIP.AUTO: NEGATIVE
PH UR STRIP.AUTO: 5.5 [PH] (ref 5–8)
PROT UR STRIP.AUTO-MCNC: NEGATIVE MG/DL
PROTHROMBIN TIME: 13.4 SECONDS (ref 11.6–14.8)
RBC UR QL AUTO: NEGATIVE
SP GR UR STRIP.AUTO: <1.005 (ref 1–1.03)
UROBILINOGEN UR STRIP.AUTO-MCNC: NORMAL MG/DL

## 2025-02-20 PROCEDURE — 85730 THROMBOPLASTIN TIME PARTIAL: CPT

## 2025-02-20 PROCEDURE — 86147 CARDIOLIPIN ANTIBODY EA IG: CPT

## 2025-02-20 PROCEDURE — 85610 PROTHROMBIN TIME: CPT

## 2025-02-20 PROCEDURE — 86146 BETA-2 GLYCOPROTEIN ANTIBODY: CPT

## 2025-02-20 PROCEDURE — 36415 COLL VENOUS BLD VENIPUNCTURE: CPT

## 2025-02-20 PROCEDURE — 81003 URINALYSIS AUTO W/O SCOPE: CPT

## 2025-02-20 PROCEDURE — 85611 PROTHROMBIN TEST: CPT

## 2025-02-20 PROCEDURE — 99214 OFFICE O/P EST MOD 30 MIN: CPT | Performed by: STUDENT IN AN ORGANIZED HEALTH CARE EDUCATION/TRAINING PROGRAM

## 2025-02-20 RX ORDER — FLUCONAZOLE 150 MG/1
TABLET ORAL
COMMUNITY
Start: 2025-02-18

## 2025-02-20 RX ORDER — TRIAMCINOLONE ACETONIDE 1 MG/G
OINTMENT TOPICAL
COMMUNITY
Start: 2025-02-05

## 2025-02-20 RX ORDER — MICONAZOLE NITRATE 1200MG-2%
KIT VAGINAL
COMMUNITY

## 2025-02-20 NOTE — PATIENT INSTRUCTIONS
Allergist:  Dr. Maher - [facility] ([facility] [place])  Dr. Nimesh Su

## 2025-02-20 NOTE — PROGRESS NOTES
[facility] Note  02/20/25    Chief Complaint   Patient presents with    Test Results     HPI:   Vanessa Gonzalez is a 34 year old female who presents for follow up.    Saw dermatologist for rash on her right outer thigh and had large bruise after itching that was raised. Had a topical prescribed. Was eating clean at the time. Has had some random itchy spots.    Patient's maternal aunt has APS and patient was tested for last year it was negative.    Not taking any new supplements.    Eating normally.    Has had bleeding gums intermittently.     Saw gynecology for passing clots and had reassurance. Had better period this month.    No dizziness.    Was considering allergy testing. Tried fragrance free products without much improvement. The dermatologist was considering patch testing but was having hives. Taking allegra and zyrtec. Was on benadryl but it was causing too much tiredness so can't take regularly. Using topical on itchy spots.     Has yeast infection. Started monistat but did not start fluconazole.     No darker stools or blood in the urine.     Wt Readings from Last 6 Encounters:   02/20/25 128 lb (58.1 kg)   12/30/24 132 lb (59.9 kg)   12/05/24 130 lb (59 kg)   11/07/24 128 lb (58.1 kg)   10/28/24 131 lb (59.4 kg)   10/21/24 129 lb 9.6 oz (58.8 kg)     Past Medical History:    Abdominal pain    All over pain, now localized to upper right/mid left    Anxiety    Diagnosed with anxiety    Arthritis    Arthritic changes to upper back    Back pain    L5/S1 bulging disc, annular tear, fibromyalgia    Back problem    L5 BULGING DISC    Belching    I noticed more frequent burping but it could also just be me    Bleeding nose    Bleeding gums only brushing, improved with dental cleanings    Bloating    I always feel like I'm bloated    Blood in the stool    Enough blood to fill toilet or just when wiping    Blood in urine    Not visible blood, microscopic, no infection     Body piercing    double pierced ear lobes, daith piercing fell out recently    Change in hair    Constipation    After a lot of diarrhea, constipation became more frequent    Diarrhea, unspecified    Every hour for whole day, got better but still frequent    Dizziness    Slight dizziness when i was nauseous. Had to lay down    Fatigue    I have fibromyalgia so i am always tired    Fever    I had lowgrade 99 fever with body aches but not since then    Fibromyalgia    Frequent urination    I was peeing every hour. Seems to be better now    Frequent use of laxatives    Headache disorder    I have migraines once a month for a week at a time    Heartburn    Occassional heart burn, not severe    Hemorrhoids    Prinary care confirmed past hemrrhoids with anal exam    History of cardiac murmur    Sinus arrhythmia, normal    History of depression    Diagnosed with depression    Indigestion    I couldn't eat anything without diarrhea    Irregular bowel habits    Elaina always have irregular habits but worse now    Itch of skin    Chronic idiopathic urticaria    Leaking of urine    I will pee and then leak even after i am done going    Leg swelling    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 Stabbing pain left mid abdomen    Painful urination    There were times i had to push when i pee    Sleep disturbance    I had 2-3 weeks of insomnia, now i am sleeping normally    Stress    Uncomfortable fullness after meals    Doesn't always happen, not too much anymore    Visual impairment    GLASSES    Wears glasses    I've had glasses since i was a child, nearsighted    Weight gain    i was always hungry  and would feel weak if i didn't eat    Weight loss    I lost a few lbs after being sick     Past Surgical History:   Procedure Laterality Date    Colonoscopy  01/01/2023    Removal of ovarian cyst(s)  01/2019     Allergies[1]   fluconazole 150 MG Oral Tab       Miconazole Nitrate-Wipes (MONISTAT 7 COMPLETE THERAPY) 100-2 MG-% Vaginal Kit       triamcinolone 0.1 % External Ointment       DULoxetine 20 MG Oral Cap DR Particles Take 1 capsule (20 mg total) by mouth daily. 30 capsule 5    OMEPRAZOLE 40 MG Oral Capsule Delayed Release TAKE 1 CAPSULE(40 MG) BY MOUTH DAILY 90 capsule 0    Ascorbic Acid (VITAMIN C) 1000 MG Oral Tab       Cholecalciferol (VITAMIN D) 50 MCG (2000 UT) Oral Cap Take 1 capsule (2,000 Units total) by mouth.      naproxen 500 MG Oral Tab Take 1 tablet (500 mg total) by mouth 2 (two) times daily as needed (take with food and big glass of water). 180 tablet 1    TRULANCE 3 MG Oral Tab Take 1 tablet by mouth daily. 30 tablet 5    fexofenadine 180 MG Oral Tab Take 1 tablet (180 mg total) by mouth daily.      cetirizine 10 MG Oral Tab Take 1 tablet (10 mg total) by mouth daily.       Social History     Socioeconomic History    Marital status:    Tobacco Use    Smoking status: Never     Passive exposure: Current    Smokeless tobacco: Never    Tobacco comments:     Denied tobacco use   Vaping Use    Vaping status: Never Used   Substance and Sexual Activity    Alcohol use: Never     Comment: Rare     Drug use: Never    Sexual activity: Yes     Partners: Male     Birth control/protection: Condom   Other Topics Concern    Caffeine Concern Yes     Comment: soda occ    Exercise No     Social Drivers of Health      [facility] [employer]     Counseling given: Not Answered  Tobacco comments: Denied tobacco use    Family History   Problem Relation Age of Onset    Allergies Father     Other (Parkinson's disease) Father     Hypertension Mother     Allergies Mother     Colon  Cancer Maternal Grandmother         She had colon cancer that spread to other organs    Heart Attack Maternal Grandfather          of a heart attack, smoker    Osteoporosis Paternal Grandmother     Bipolar Disorder Sister     Psoriasis Sister     Substance Abuse Brother     Diabetes Maternal Aunt     Diabetes Paternal Uncle     Other (cancer unsure what type) Paternal Uncle      Family Status   Relation Status    Fa Alive    Mo Alive    MGMA     MGFA (Not Specified)    PGMA (Not Specified)    Sis Alive    Bro Alive    Mat Aunt (Not Specified)    Paternal Unc (Not Specified)        REVIEW OF SYSTEMS:   See HPI    EXAM:   /78   Pulse 92   Temp 97.6 °F (36.4 °C) (Temporal)   Resp 18   Ht 5' 2\" (1.575 m)   Wt 128 lb (58.1 kg)   LMP 2025 (Exact Date)   BMI 23.41 kg/m²  Estimated body mass index is 23.41 kg/m² as calculated from the following:    Height as of this encounter: 5' 2\" (1.575 m).    Weight as of this encounter: 128 lb (58.1 kg).   Vital signs reviewed. Appears stated age, well groomed.  Physical Exam:  GEN:  Patient is alert and oriented x3, no apparent distress  HEAD:  Normocephalic, atraumatic  HEENT:  no scleral icterus, conjunctivae clear bilaterally, EOMI, PERRLA, OP clear  LUNGS: clear to auscultation bilaterally, no rales/rhonchi/wheezing  HEART:  Regular rate and rhythm, normal S1/S2, no murmurs, rubs or gallops  EXTREMITIES:  Moves all extremities well  NEURO:  CN 2 - 12 grossly intact, gait normal      ASSESSMENT AND PLAN:   1. Abnormal prothrombin time (PT)  Patient has easy bruising and was found to have abnormal prothrombin time and mixing study  - will recheck labs  - will check for APS given family hx in maternal aunt  - further testing pending results, consider hematology evaluation as well  - Prothrombin Time (PT) [E]; Future  - PTT, Activated [E]; Future  - Mixing Study (PT); Future  - Antiphospholipid Syndrome (APS) Profile; Future  - UA/M With Culture Reflex  [E]; Future    2. Easy bruising  Will also check UA  - Prothrombin Time (PT) [E]; Future  - PTT, Activated [E]; Future  - Mixing Study (PT); Future  - Antiphospholipid Syndrome (APS) Profile; Future  - UA/M With Culture Reflex [E]; Future    3. Family history of antiphospholipid syndrome  - Prothrombin Time (PT) [E]; Future  - PTT, Activated [E]; Future  - Mixing Study (PT); Future  - Antiphospholipid Syndrome (APS) Profile; Future  - UA/M With Culture Reflex [E]; Future    4. Itching  Provided names of allergists, should check with insurance to determine who is preferred, appreciate evaluation and recommendations  - UA/M With Culture Reflex [E]; Future        Meds & Refills for this Visit:  Requested Prescriptions      No prescriptions requested or ordered in this encounter       Stop Taking                cyclobenzaprine 10 MG Oral Tab    Take 1 tablet (10 mg total) by mouth nightly as needed for Muscle spasms.            Health Maintenance:  Health Maintenance Due   Topic Date Due    Annual Physical  Never done    DTaP,Tdap,and Td Vaccines (1 - Tdap) Never done    Influenza Vaccine (1) 10/01/2024    Annual Depression Screening  01/01/2025       Patient/Caregiver Education: There are no barriers to learning. Medical education done.   Outcome: Patient verbalizes understanding. Patient is notified to call with any questions, complications, allergies, or worsening or changing symptoms.  Patient is to call with any side effects or complications from the treatments as a result of today.     Problem List:  Patient Active Problem List   Diagnosis    Lumbago    Well woman exam with routine gynecological exam    Screen for STD (sexually transmitted disease)    Discogenic low back pain    Encounter for surveillance of contraceptive pills    Fibromyalgia    Chronic urticaria    Positive OR[place]O (antinuclear antibody)    Polyarthralgia    Urinary retention    Urinary incontinence    Vitamin D deficiency    Raynaud's phenomenon  without gangrene    History of hyperprolactinemia    Pituitary microadenoma (HCC)    Right upper quadrant pain    Diarrhea, unspecified    Chronic superficial gastritis without bleeding    Gastroesophageal reflux disease without esophagitis    Chronic heartburn    Abdominal bloating    Hematochezia    Chronic pain of left knee    Involuntary jerky movements       No follow-ups on file.      Nyasia Carbajal MD  [facility]  02/20/25      Please note that portions of this note may have been completed with a voice recognition program. Efforts were made to edit the dictations but occasionally words are mis-transcribed. Thank you for your understanding.    The 21st Century Cures Act makes medical notes like these available to patients in the interest of transparency. Please be advised this is a medical document. Medical documents are intended to carry relevant information, facts as evident, and the clinical opinion of the practitioner. The medical note is intended as peer to peer communication and may appear blunt or direct. It is written in medical language and may contain abbreviations or verbiage that are unfamiliar. If there are any questions or concerns please contact the provider for clarification.              [1]   Allergies  Allergen Reactions    Cat Hair Extract HIVES, ITCHING and Runny nose    Gabapentin ANXIETY, FATIGUE and OTHER (SEE COMMENTS)    Peanuts ITCHING    Diclofenac DIARRHEA and OTHER (SEE COMMENTS)    Diclofenac Sodium DIARRHEA and OTHER (SEE COMMENTS)    Lyrica INSOMNIA    Nickel RASH    Nickel RASH    Pregabalin INSOMNIA    Seasonal Runny nose and OTHER (SEE COMMENTS)     Sneezing, watery eyes

## 2025-02-24 DIAGNOSIS — R79.1 ABNORMAL PARTIAL THROMBOPLASTIN TIME (PTT): ICD-10-CM

## 2025-02-24 DIAGNOSIS — Z83.2 FAMILY HISTORY OF ANTIPHOSPHOLIPID SYNDROME: ICD-10-CM

## 2025-02-24 DIAGNOSIS — R79.1 ABNORMAL PROTHROMBIN TIME (PT): ICD-10-CM

## 2025-02-24 DIAGNOSIS — R76.0 ANTI-CARDIOLIPIN ANTIBODY POSITIVE: Primary | ICD-10-CM

## 2025-02-24 DIAGNOSIS — R23.3 EASY BRUISING: ICD-10-CM

## 2025-02-24 LAB
APTT: 27.4 SEC
B2 GLYCOPROT I IGG AB: <9 GPI IGG UNITS
B2 GLYCOPROT I IGM AB: <9 GPI IGM UNITS
CARDIOLIPIN IGG: <9 GPL U/ML
CARDIOLIPIN IGM: 23 MPL U/ML
DRVVT: 25.7 SEC
HEXAGONAL PHASE PHOSPHOLIPID: 3 SEC
INR: 1.1
PT: 11.5 SEC
THROMBIN TIME: 17.2 SEC

## 2025-03-18 ENCOUNTER — PATIENT MESSAGE (OUTPATIENT)
Dept: NEUROLOGY | Facility: CLINIC | Age: 35
End: 2025-03-18

## 2025-03-18 DIAGNOSIS — M79.7 FIBROMYALGIA: ICD-10-CM

## 2025-03-19 ENCOUNTER — PATIENT MESSAGE (OUTPATIENT)
Dept: FAMILY MEDICINE CLINIC | Facility: CLINIC | Age: 35
End: 2025-03-19

## 2025-03-19 DIAGNOSIS — R10.13 EPIGASTRIC PAIN: ICD-10-CM

## 2025-03-19 RX ORDER — DULOXETIN HYDROCHLORIDE 20 MG/1
20 CAPSULE, DELAYED RELEASE ORAL DAILY
Qty: 90 CAPSULE | Refills: 0 | Status: SHIPPED | OUTPATIENT
Start: 2025-03-19

## 2025-03-19 RX ORDER — OMEPRAZOLE 40 MG/1
40 CAPSULE, DELAYED RELEASE ORAL DAILY
Qty: 90 CAPSULE | Refills: 1 | Status: SHIPPED | OUTPATIENT
Start: 2025-03-19

## 2025-03-19 NOTE — TELEPHONE ENCOUNTER
Medication: Cymbalta     Date of last refill: 12/30/2024 (#30/5)  [place]      Last office visit: 12/30/2024  Due back to clinic per last office note:  6 months  Date next office visit scheduled:    Future Appointments   Date Time Provider Department Center   4/16/2025  5:00 PM Susan Millan APRN [facility]   6/30/2025  4:10 PM Wilfrido Freire MD [facility]           Last OV note recommendation:    Vanessa is a 34 year old female with history of anxiety, fibromyalgia and migraines presenting with multiple neurological symptoms predominantly intermittent twitching and involuntary jerky movements      Symptoms likely related to underlying anxiety.   MRI brain obtained by PCP was negative for acute abnormality  EEG was negative for seizure activity    Continue Duloxetine 20 mg daily     Continue psychotherapy     Take Magnesium supplements for migraine prevention and for twitching/cramps  Use Excedrin as needed for migraine. Will consider triptans if needed     Follow up in about 6 months

## 2025-04-23 ENCOUNTER — OFFICE VISIT (OUTPATIENT)
Facility: CLINIC | Age: 35
End: 2025-04-23
Payer: COMMERCIAL

## 2025-04-23 VITALS
HEART RATE: 101 BPM | WEIGHT: 129 LBS | SYSTOLIC BLOOD PRESSURE: 102 MMHG | DIASTOLIC BLOOD PRESSURE: 68 MMHG | BODY MASS INDEX: 23.74 KG/M2 | HEIGHT: 62 IN

## 2025-04-23 DIAGNOSIS — N76.0 VAGINITIS AND VULVOVAGINITIS: Primary | ICD-10-CM

## 2025-04-23 PROCEDURE — 99212 OFFICE O/P EST SF 10 MIN: CPT

## 2025-04-23 PROCEDURE — 81514 NFCT DS BV&VAGINITIS DNA ALG: CPT

## 2025-04-23 NOTE — PROGRESS NOTES
Vanessa Gonzalez is a 34 year old female  Patient's last menstrual period was 2025 (exact date).   Chief Complaint   Patient presents with    Gyn Problem     -reoccurring vaginal itching and burning,sometimes has a thick white creamy discharge   .  Symptoms have been on and off for a couple of months.       OBSTETRICS HISTORY:  OB History    Para Term  AB Living   0 0 0 0 0 0   SAB IAB Ectopic Multiple Live Births   0 0 0 0 0       GYNE HISTORY:      History   Sexual Activity    Sexual activity: Yes    Partners: Male    Birth control/ protection: Condom                 MEDICAL HISTORY:  Past Medical History[1]    SURGICAL HISTORY:  Past Surgical History[2]    SOCIAL HISTORY:  Social History     Socioeconomic History    Marital status:      Spouse name: Not on file    Number of children: Not on file    Years of education: Not on file    Highest education level: Not on file   Occupational History    Not on file   Tobacco Use    Smoking status: Never     Passive exposure: Current    Smokeless tobacco: Never    Tobacco comments:     Denied tobacco use   Vaping Use    Vaping status: Never Used   Substance and Sexual Activity    Alcohol use: Never     Comment: Rare     Drug use: Never    Sexual activity: Yes     Partners: Male     Birth control/protection: Condom   Other Topics Concern     Service Not Asked    Blood Transfusions Not Asked    Caffeine Concern Yes     Comment: soda occ    Occupational Exposure Not Asked    Hobby Hazards Not Asked    Sleep Concern Not Asked    Stress Concern Not Asked    Weight Concern Not Asked    Special Diet Not Asked    Back Care Not Asked    Exercise No    Bike Helmet Not Asked    Seat Belt Not Asked    Self-Exams Not Asked   Social History Narrative    Not on file     Social Drivers of Health     Food Insecurity: Not on file   Transportation Needs: Not on file   Stress: Not on file   Housing Stability: At Risk (2023)    Received  from [facility]     Living Situation: Not on file     Housing Problems: Not on file       FAMILY HISTORY:  Family History[3]    MEDICATIONS:  Medications - Current[4]    ALLERGIES:  Allergies[5]      PHYSICAL EXAM:   Pelvic Exam:  External Genitalia: normal appearance, hair distribution, and no lesions  Urethral Meatus:  normal in size, location, without lesions and prolapse  Bladder:  No fullness, masses or tenderness  Vagina:  Normal appearance without lesions, no abnormal discharge  Cervix:  Normal without tenderness on motion  Uterus: normal in size, contour, position, mobility, without tenderness  Adnexa: normal without masses or tenderness  Perineum: normal  Anus: no hemorroids     Assessment & Plan:  . Vaginitis and vulvovaginitis    - Vaginitis Vaginosis PCR Panel; Future               [1]   Past Medical History:   Abdominal pain    All over pain, now localized to upper right/mid left    Allergic rhinitis    Allergic to grass    Anemia    Anxiety    Diagnosed with anxiety    Arthritis    Arthritic changes to upper back    Back pain    L5/S1 bulging disc, annular tear, fibromyalgia    Back problem    L5 BULGING DISC    Belching    I noticed more frequent burping but it could also just be me    Bleeding nose    Bleeding gums only brushing, improved with dental cleanings    Bloating    I always feel like I'm bloated    Blood in the stool    Enough blood to fill toilet or just when wiping    Blood in urine    Not visible blood, microscopic, no infection    Body piercing    double pierced ear lobes, daith piercing fell out recently    Change in hair    Constipation    After a lot of diarrhea, constipation became more frequent    Depression    Diarrhea, unspecified    Every hour for whole day, got better but still frequent    Dizziness    Slight dizziness when i was nauseous. Had to lay down    Dysmenorrhea    I have fibromyalgia so periods can be very painful the first couple days    Dyspareunia     Fatigue    I have fibromyalgia so i am always tired    Fever    I had lowgrade 99 fever with body aches but not since then    Fibromyalgia    Frequent urination    I was peeing every hour. Seems to be better now    Frequent use of laxatives    Headache disorder    I have migraines once a month for a week at a time    Heartburn    Occassional heart burn, not severe    Hemorrhoids    Prinary care confirmed past hemrrhoids with anal exam    History of cardiac murmur    Sinus arrhythmia, normal    History of depression    Diagnosed with depression    Indigestion    I couldn't eat anything without diarrhea    Irregular bowel habits    Elaina always have irregular habits but worse now    Itch of skin    Chronic idiopathic urticaria    Leaking of urine    I will pee and then leak even after i am done going    Leg swelling    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 Stabbing pain left mid abdomen    Painful urination    There were times i had to push when i pee    Sleep disturbance    I had 2-3 weeks of insomnia, now i am sleeping normally    Stress    Uncomfortable fullness after meals    Doesn't always happen, not too much anymore    UTI (urinary tract infection)    Have not had one in over 5 years    Visual impairment    GLASSES    Wears glasses    I've had glasses since i was a child, nearsighted    Weight gain    i was always hungry and would feel weak if i didn't eat    Weight loss    I lost a few lbs after being sick   [2]   Past Surgical History:  Procedure Laterality Date    Colonoscopy  01/01/2023    Cyst  removal  2019    Laparoscopy, ovarian cyst removal    Other surgical history  2019, 2020    Laparoscopy, ovarian cyst removal/spinal injection    Removal of ovarian cyst(s)  2019    Skin surgery  2021    Skin biopsy to remove a hive to see what is causing chronic   [3]   Family History  Problem Relation Age of Onset    Allergies Father     Other (Parkinson's disease) Father     Hypertension Mother     Allergies Mother     Colon Cancer Maternal Grandmother         She had colon cancer that spread to other organs    Cancer Maternal Grandmother         Colon cancer that spread to her stomach and other places.    Heart Attack Maternal Grandfather          of a heart attack, smoker    Osteoporosis Paternal Grandmother     Bipolar Disorder Sister     Psoriasis Sister     Substance Abuse Brother     Diabetes Maternal Aunt     Diabetes Paternal Uncle     Other (cancer unsure what type) Paternal Uncle    [4]   Current Outpatient Medications:     DULoxetine 20 MG Oral Cap DR Particles, Take 1 capsule (20 mg total) by mouth daily., Disp: 90 capsule, Rfl: 0    Omeprazole 40 MG Oral Capsule Delayed Release, Take 1 capsule (40 mg total) by mouth daily., Disp: 90 capsule, Rfl: 1    fluconazole 150 MG Oral Tab, , Disp: , Rfl:     Miconazole Nitrate-Wipes (MONISTAT 7 COMPLETE THERAPY) 100-2 MG-% Vaginal Kit, , Disp: , Rfl:     triamcinolone 0.1 % External Ointment, , Disp: , Rfl:     Ascorbic Acid (VITAMIN C) 1000 MG Oral Tab, , Disp: , Rfl:     Cholecalciferol (VITAMIN D) 50 MCG (2000 UT) Oral Cap, Take 1 capsule (2,000 Units total) by mouth., Disp: , Rfl:     naproxen 500 MG Oral Tab, Take 1 tablet (500 mg total) by mouth 2 (two) times daily as needed (take with food and big glass of water)., Disp: 180 tablet, Rfl: 1    TRULANCE 3 MG Oral Tab, Take 1 tablet by mouth daily., Disp: 30 tablet, Rfl: 5    fexofenadine 180 MG Oral Tab, Take 1 tablet (180 mg total) by mouth daily., Disp: , Rfl:     cetirizine  10 MG Oral Tab, Take 1 tablet (10 mg total) by mouth daily., Disp: , Rfl:   [5]   Allergies  Allergen Reactions    Cat Hair Extract HIVES, ITCHING and Runny nose    Gabapentin ANXIETY, FATIGUE and OTHER (SEE COMMENTS)    Peanuts ITCHING    Diclofenac DIARRHEA and OTHER (SEE COMMENTS)    Diclofenac Sodium DIARRHEA and OTHER (SEE COMMENTS)    Lyrica INSOMNIA    Nickel RASH    Nickel RASH    Pregabalin INSOMNIA    Seasonal Runny nose and OTHER (SEE COMMENTS)     Sneezing, watery eyes

## 2025-04-24 ENCOUNTER — PATIENT MESSAGE (OUTPATIENT)
Facility: CLINIC | Age: 35
End: 2025-04-24

## 2025-04-24 LAB
BV BACTERIA DNA VAG QL NAA+PROBE: NEGATIVE
C GLABRATA DNA VAG QL NAA+PROBE: NEGATIVE
C KRUSEI DNA VAG QL NAA+PROBE: NEGATIVE
CANDIDA DNA VAG QL NAA+PROBE: NEGATIVE
T VAGINALIS DNA VAG QL NAA+PROBE: NEGATIVE

## 2025-05-08 ENCOUNTER — TELEPHONE (OUTPATIENT)
Dept: FAMILY MEDICINE CLINIC | Facility: CLINIC | Age: 35
End: 2025-05-08

## 2025-05-08 NOTE — TELEPHONE ENCOUNTER
Called and spoke with patient and she states 2 days ago she developed numbness, reddish and purplish color on her right great toe.  Denies pain.  States she usually gets this, but never with the discoloration and usually goes away after putting on warm and thick socks.  This time it is lasting longer.    Also complains of bilateral heel numbness, feels coldish and sometimes would be whitish in color.  Wearing her compression socks does give her relief.  This is new for per patient.    Also complain of bilateral knee pain.  States she does have fibromyalgia and arthritis.    Discussed symptoms with Dr. Carbajal and she advised IC or ER for patient for further evaluation today.    Patient advised IC and she voiced understanding and agreed with plan.  Canceled appointment with Dr. Davalos at 445 PM today.

## 2025-05-08 NOTE — TELEPHONE ENCOUNTER
Pt made [employer] appt with following message     My pinky toe on my right foot is numb. Sometimes both heels feel numb. Big toes slightly numb too   Seeing Dr Davalos at 445 today. If keeping appointment please move to 430. Thank you.

## 2025-05-08 NOTE — TELEPHONE ENCOUNTER
Anesthesia Evaluation      Patient summary reviewed     Airway   Mallampati: I  Neck ROM: full   Pulmonary - negative ROS and normal exam    breath sounds clear to auscultation  (+) sleep apnea on CPAP, ,                          Cardiovascular - negative ROS and normal exam  Rhythm: regular  Rate: normal,         Neuro/Psych - negative ROS     Endo/Other - negative ROS      GI/Hepatic/Renal - negative ROS           Dental - normal exam                        Anesthesia Plan  Planned anesthetic: general endotracheal    ASA 3   Induction: intravenous   Anesthetic plan and risks discussed with: patient and spouse  Anesthesia plan special considerations: antiemetics,   Post-op plan: routine recovery           Did you triage to see if this is an acute or chronic issue?  Please get more information as she typically sees Dr. Carbajal

## 2025-05-09 ENCOUNTER — PATIENT MESSAGE (OUTPATIENT)
Dept: FAMILY MEDICINE CLINIC | Facility: CLINIC | Age: 35
End: 2025-05-09

## 2025-05-13 NOTE — TELEPHONE ENCOUNTER
It could be raynaud's then. If better then continue to monitor for now. No need to be evaluated if all symptoms are resolved. Thank you.

## 2025-06-17 DIAGNOSIS — M79.7 FIBROMYALGIA: ICD-10-CM

## 2025-06-17 RX ORDER — DULOXETIN HYDROCHLORIDE 20 MG/1
20 CAPSULE, DELAYED RELEASE ORAL DAILY
Qty: 90 CAPSULE | Refills: 0 | Status: SHIPPED | OUTPATIENT
Start: 2025-06-17

## 2025-06-17 NOTE — TELEPHONE ENCOUNTER
Medication: DULOXETINE 20 MG Oral Cap DR Particles      Date of last refill: 03/19/2025 (#90/0)  [place]     Last office visit: 12/30/2024  Due back to clinic per last office note:  Around 06/30/2025  Date next office visit scheduled:    Future Appointments   Date Time Provider Department Center   6/30/2025  4:10 PM Wilfrido Freire MD [facility]           Last OV note recommendation:    ASSESSMENT/PLAN:           Encounter Diagnoses   Name Primary?    Involuntary jerky movements Yes    Multiple neurological symptoms      Fibromyalgia           Vanessa is a 34 year old female with history of anxiety, fibromyalgia and migraines presenting with multiple neurological symptoms predominantly intermittent twitching and involuntary jerky movements      Symptoms likely related to underlying anxiety.   MRI brain obtained by PCP was negative for acute abnormality  EEG was negative for seizure activity    Continue Duloxetine 20 mg daily     Continue psychotherapy     Take Magnesium supplements for migraine prevention and for twitching/cramps  Use Excedrin as needed for migraine. Will consider triptans if needed     Follow up in about 6 months        No orders of the defined types were placed in this encounter.        Thank you for allowing us to participate in your patient's care.        Wilfrido Freire MD  [facility] [place]

## 2025-06-30 ENCOUNTER — OFFICE VISIT (OUTPATIENT)
Dept: NEUROLOGY | Facility: CLINIC | Age: 35
End: 2025-06-30
Payer: COMMERCIAL

## 2025-06-30 VITALS
HEIGHT: 62 IN | SYSTOLIC BLOOD PRESSURE: 120 MMHG | RESPIRATION RATE: 16 BRPM | OXYGEN SATURATION: 98 % | DIASTOLIC BLOOD PRESSURE: 70 MMHG | HEART RATE: 110 BPM | BODY MASS INDEX: 24.48 KG/M2 | WEIGHT: 133 LBS

## 2025-06-30 DIAGNOSIS — M79.7 FIBROMYALGIA: ICD-10-CM

## 2025-06-30 DIAGNOSIS — G43.009 MIGRAINE WITHOUT AURA AND WITHOUT STATUS MIGRAINOSUS, NOT INTRACTABLE: ICD-10-CM

## 2025-06-30 PROCEDURE — 99213 OFFICE O/P EST LOW 20 MIN: CPT | Performed by: OTHER

## 2025-06-30 RX ORDER — RIZATRIPTAN BENZOATE 10 MG/1
10 TABLET ORAL AS NEEDED
Qty: 10 TABLET | Refills: 2 | Status: SHIPPED | OUTPATIENT
Start: 2025-06-30

## 2025-06-30 NOTE — PROGRESS NOTES
HPI:    Patient ID: Vanessa Gonzalez is a 34 year old female.    Neurologic Problem  Pertinent negatives include no headaches.   Migraine   Pertinent negatives include no numbness.       Patient returns to the clinic for follow up. Overall doing fine  Had couple headaches around her period, only using Excedrin Migraine as needed  She is on duloxetine for probable fibromyalgia         Vanessa is a 34 year old female with history of anxiety, fibromyalgia, migraines who presents for evaluation of multiple neurological symptoms. States reports since August 2024 started having twitching, first started in the right foot/leg and then moved to left LE and to whole body. States symptoms began around the time of her menstrual period. A week before that also had fibromyalgia flare up with pain in her hands and generalized body aches. She is not on any medications for fibromyalgia, tried Gabapentin and Pregabalin in the past but was not able to tolerate.      On 9/14/2024 she was at [employer] and didn't felt well, dizzy/lightheaded, started hyperventilating, bilateral facial tingling, cramps in hand/carpopedal spasm and upper body forward jerking. She was awake and alert all the time. No history of seizures.  EMS was called and brought to the [place] ED, lab work including CK and electrolytes was fine. She was diagnosed with anxiety attack.   continues to have intermittent twitching and involuntary upper body tics since then.     She also experiences periodic migraines with aura and increased twitching. She had one episode of blurry vision that lasted a few minutes but hasn't recurred. Had MRI brain wo contrast through her PCP on 10/17/2024 which was negative for acute abnormality.  Has long standing history of anxiety, sees a therapist once a month.          HISTORY:  Past Medical History:    Abdominal pain    All over pain, now localized to upper right/mid left    Allergic rhinitis    Allergic to grass     Anemia    Anxiety    Diagnosed with anxiety    Arthritis    Arthritic changes to upper back    Back pain    L5/S1 bulging disc, annular tear, fibromyalgia    Back problem    L5 BULGING DISC    Belching    I noticed more frequent burping but it could also just be me    Bleeding nose    Bleeding gums only brushing, improved with dental cleanings    Bloating    I always feel like I'm bloated    Blood in the stool    Enough blood to fill toilet or just when wiping    Blood in urine    Not visible blood, microscopic, no infection    Body piercing    double pierced ear lobes, daith piercing fell out recently    Change in hair    Constipation    After a lot of diarrhea, constipation became more frequent    Depression    Diarrhea, unspecified    Every hour for whole day, got better but still frequent    Dizziness    Slight dizziness when i was nauseous. Had to lay down    Dysmenorrhea    I have fibromyalgia so periods can be very painful the first couple days    Dyspareunia    Fatigue    I have fibromyalgia so i am always tired    Fever    I had lowgrade 99 fever with body aches but not since then    Fibromyalgia    Frequent urination    I was peeing every hour. Seems to be better now    Frequent use of laxatives    Headache disorder    I have migraines once a month for a week at a time    Heartburn    Occassional heart burn, not severe    Hemorrhoids    Prinary care confirmed past hemrrhoids with anal exam    History of cardiac murmur    Sinus arrhythmia, normal    History of depression    Diagnosed with depression    Indigestion    I couldn't eat anything without diarrhea    Irregular bowel habits    Elaina always have irregular habits but worse now    Itch of skin    Chronic idiopathic urticaria    Leaking of urine    I will pee and then leak even after i am done going    Leg swelling    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 Stabbing pain left mid abdomen    Painful urination    There were times i had to push when i pee    Sleep disturbance    I had 2-3 weeks of insomnia, now i am sleeping normally    Stress    Uncomfortable fullness after meals    Doesn't always happen, not too much anymore    UTI (urinary tract infection)    Have not had one in over 5 years    Visual impairment    GLASSES    Wears glasses    I've had glasses since i was a child, nearsighted    Weight gain    i was always hungry and would feel weak if i didn't eat    Weight loss    I lost a few lbs after being sick      Past Surgical History:   Procedure Laterality Date    Colonoscopy  2023    Cyst removal  2019    Laparoscopy, ovarian cyst removal    Other surgical history  2019, 2020    Laparoscopy, ovarian cyst removal/spinal injection    Removal of ovarian cyst(s)  2019    Skin surgery  2021    Skin biopsy to remove a hive to see what is causing chronic      Family History   Problem Relation Age of Onset    Allergies Father     Other (Parkinson's disease) Father     Hypertension Mother     Allergies Mother     Colon Cancer Maternal Grandmother         She had colon cancer that spread to other organs    Cancer Maternal Grandmother         Colon cancer that spread to her stomach and other places.    Heart Attack Maternal Grandfather          of a heart attack, smoker    Osteoporosis Paternal Grandmother     Bipolar Disorder Sister     Psoriasis Sister     Substance Abuse Brother     Diabetes Maternal Aunt     Diabetes Paternal Uncle     Other (cancer unsure what type) Paternal Uncle       Social History      Socioeconomic History    Marital status:    Tobacco Use    Smoking status: Never     Passive exposure: Current    Smokeless tobacco: Never    Tobacco comments:     Denied tobacco use   Vaping Use    Vaping status: Never Used   Substance and Sexual Activity    Alcohol use: Never     Comment: Rare     Drug use: Never    Sexual activity: Yes     Partners: Male     Birth control/protection: Condom   Other Topics Concern    Caffeine Concern Yes     Comment: soda occ    Exercise Yes     Comment: little     Social Drivers of Health      Received from [facility]        Review of Systems   Constitutional: Negative.    HENT: Negative.     Eyes: Negative.    Respiratory: Negative.     Cardiovascular: Negative.    Gastrointestinal: Negative.    Endocrine: Negative.    Genitourinary: Negative.    Musculoskeletal: Negative.    Allergic/Immunologic: Negative.    Neurological:  Positive for tremors. Negative for numbness and headaches.        + tingling bilateral face   Hematological: Negative.    Psychiatric/Behavioral: Negative.     All other systems reviewed and are negative.           Current Outpatient Medications   Medication Sig Dispense Refill    DULOXETINE 20 MG Oral Cap DR Particles TAKE 1 CAPSULE BY MOUTH EVERY DAY 90 capsule 0    Omeprazole 40 MG Oral Capsule Delayed Release Take 1 capsule (40 mg total) by mouth daily. 90 capsule 1    triamcinolone 0.1 % External Ointment       Ascorbic Acid (VITAMIN C) 1000 MG Oral Tab       Cholecalciferol (VITAMIN D) 50 MCG (2000 UT) Oral Cap Take 1 capsule (2,000 Units total) by mouth.      naproxen 500 MG Oral Tab Take 1 tablet (500 mg total) by mouth 2 (two) times daily as needed (take with food and big glass of water). 180 tablet 1    TRULANCE 3 MG Oral Tab Take 1 tablet by mouth daily. 30 tablet 5    fexofenadine 180 MG Oral Tab Take 1 tablet (180 mg total) by mouth daily.      cetirizine 10 MG Oral Tab Take 1 tablet (10 mg total) by mouth daily.       fluconazole 150 MG Oral Tab       Miconazole Nitrate-Wipes (MONISTAT 7 COMPLETE THERAPY) 100-2 MG-% Vaginal Kit  (Patient not taking: Reported on 6/30/2025)       Allergies:Allergies[1]  PHYSICAL EXAM:   Physical Exam  Blood pressure 120/70, pulse 110, resp. rate 16, height 62\", weight 133 lb (60.3 kg), last menstrual period 03/30/2025, SpO2 98%, not currently breastfeeding.    General Appearance: Well nourished, well developed, no apparent distress.     HEENT: Normocephalic and atraumatic. Normal sclera.   Cardiovascular: Normal rate, regular rhythm and normal heart sounds.    Pulmonary/Chest: Effort normal and breath sounds normal.   Abdominal: Soft. Bowel sounds are normal.   Skin: dry, clean and intact  Ext: peripheral pulses present  Psych: normal mood and affect    Neurological:  Patient is awake, alert and oriented to person, place and time   Normal memory, attention/concentration, speech and language.    Cranial Nerves:   II: Visual acuity: normal  III: Pupils: equal, round, reactive to light  III,IV,VI: Extra Ocular Movements: intact  V: Facial sensation: intact  VII: Facial strength: intact  VIII: Hearing: intact  IX: Palate: intact  XI: Shoulder shrug: intact  XII: Tongue movement: normal    Motor: Normal tone. Strength is  5 out of 5 in all extremities bilaterally.  DTR: present and mildly brisk throughout    Sensory: Sensory examination is normal to light touch and pinprick     Coordination: Finger-to-nose normal bilaterally without evidence of dysmetria.    Gait: normal casual gait     TESTS/IMAGING:     MRI brain: 10/17/2024  The ventricles and sulci are within normal limits.  There is no midline shift or mass effect.  The basal cisterns are patent.       There is no acute intracranial hemorrhage or extra-axial fluid collection identified.  There is no parenchymal signal abnormality identified.  There is no restricted diffusion to suggest acute ischemia/infarction.      The visualized paranasal  sinuses and mastoid air cells are unremarkable.  The expected major intracranial flow voids are present.           Impression   CONCLUSION:  No acute intracranial abnormality is identified.          ASSESSMENT/PLAN:       ICD-10-CM    1. Migraine without aura and without status migrainosus, not intractable  G43.009       2. Fibromyalgia  M79.7               Vanessa is a 34 year old female with history of anxiety, fibromyalgia and migraines presenting with multiple neurological symptoms predominantly intermittent twitching and involuntary jerky movements     Symptoms likely related to underlying anxiety.   MRI brain obtained by PCP was negative for acute abnormality  EEG was negative for seizure activity    Continue Duloxetine 20 mg daily    Start rizatriptan as needed for migraine headaches      Take Magnesium supplements for migraine prevention and for twitching/cramps      Follow up in about 6 months      No orders of the defined types were placed in this encounter.      Thank you for allowing us to participate in your patient's care.      Wilfrido Freire MD  [facility] [place]            Meds This Visit:  Requested Prescriptions      No prescriptions requested or ordered in this encounter       Imaging & Referrals:  None     ID#[id number]         [1]   Allergies  Allergen Reactions    Cat Hair Extract HIVES, ITCHING and Runny nose    Gabapentin ANXIETY, FATIGUE and OTHER (SEE COMMENTS)    Peanuts ITCHING    Diclofenac DIARRHEA and OTHER (SEE COMMENTS)    Diclofenac Sodium DIARRHEA and OTHER (SEE COMMENTS)    Lyrica INSOMNIA    Nickel RASH    Nickel RASH    Pregabalin INSOMNIA    Seasonal Runny nose and OTHER (SEE COMMENTS)     Sneezing, watery eyes

## 2025-06-30 NOTE — PROGRESS NOTES
The following individual(s) verbally consented to be recorded using ambient AI listening technology and understand that they can each withdraw their consent to this listening technology at any point by asking the clinician to turn off or pause the recording:    Patient name: Vanessa Gonzalez  Additional names:

## 2025-06-30 NOTE — PATIENT INSTRUCTIONS
Refill policies:    Allow 2-3 business days for refills; controlled substances may take longer.  Contact your pharmacy at least 5 days prior to running out of medication and have them send an electronic request or submit request through the “request refill” option in your [employer] account.  Refills are not addressed on weekends; covering physicians do not authorize routine medications on weekends.  No narcotics or controlled substances are refilled after noon on Fridays or by on call physicians.  By law, narcotics must be electronically prescribed.  A 30 day supply with no refills is the maximum allowed.  If your prescription is due for a refill, you may be due for a follow up appointment.  To best provide you care, patients receiving routine medications need to be seen at least once a year.  Patients receiving narcotic/controlled substance medications need to be seen at least once every 3 months.  In the event that your preferred pharmacy does not have the requested medication in stock (e.g. Backordered), it is your responsibility to find another pharmacy that has the requested medication available.  We will gladly send a new prescription to that pharmacy at your request.    Scheduling Tests:    If your physician has ordered radiology tests such as MRI or CT scans, please contact Central Scheduling at [phone] right away to schedule the test.  Once scheduled, the [facility] Centralized Referral Team will work with your insurance carrier to obtain pre-certification or prior authorization.  Depending on your insurance carrier, approval may take 3-10 days.  It is highly recommended patients assure they have received an authorization before having a test performed.  If test is done without insurance authorization, patient may be responsible for the entire amount billed.      Precertification and Prior Authorizations:  If your physician has recommended that you have a procedure or additional testing performed the [facility]  Centralized Referral Team will contact your insurance carrier to obtain pre-certification or prior authorization.    You are strongly encouraged to contact your insurance carrier to verify that your procedure/test has been approved and is a COVERED benefit.  Although the [facility] Centralized Referral Team does its due diligence, the insurance carrier gives the disclaimer that \"Although the procedure is authorized, this does not guarantee payment.\"    Ultimately the patient is responsible for payment.   Thank you for your understanding in this matter.  Paperwork Completion:  If you require FMLA or disability paperwork for your recovery, please make sure to either drop it off or have it faxed to our office at [phone]. Be sure the form has your name and date of birth on it.  The form will be faxed to our Forms Department and they will complete it for you.  There is a 25$ fee for all forms that need to be filled out.  Please be aware there is a 10-14 day turnaround time.  You will need to sign a release of information (STUART) form if your paperwork does not come with one.  You may call the Forms Department with any questions at [phone].  Their fax number is [phone].

## 2025-07-08 ENCOUNTER — PATIENT MESSAGE (OUTPATIENT)
Dept: NEUROLOGY | Facility: CLINIC | Age: 35
End: 2025-07-08

## 2025-07-08 DIAGNOSIS — G43.009 MIGRAINE WITHOUT AURA AND WITHOUT STATUS MIGRAINOSUS, NOT INTRACTABLE: Primary | ICD-10-CM

## 2025-07-08 RX ORDER — UBROGEPANT 50 MG/1
TABLET ORAL
Qty: 10 TABLET | Refills: 2 | Status: SHIPPED | OUTPATIENT
Start: 2025-07-08 | End: 2026-07-08

## 2025-07-08 NOTE — TELEPHONE ENCOUNTER
Wilfrido Freire MD to Raquel Rosenthal Nurse        7/8/25  1:45 PM  She can try Ubrelvy 50 mg as needed. Thanks

## 2025-07-08 NOTE — TELEPHONE ENCOUNTER
Vanessa [place] Nurse (supporting Wilfrido Freire MD)1 hour ago (6:53 AM)     CB  Good morning. I wanted to let you know rizatriptan may not be a good fit for me. It does work but my headache keeps coming back. I took 3 last week and took Excedrin in between so I don't depend on it. Saturday and Sunday I didn't have to take it. Yesterday I had a migraine so bad I was throwing up so I had to take two. I already took 5 out of the 9 or 10 they give so I don't think that is good. I also had very tingly toes and fingers and they felt like they were asleep so I slept with compression socks on. Today I'm ok but I feel pain on one side of my head like it wants to come back. Please let me know if there is anything else I should do. I'm scared of rebound headaches.

## 2025-07-08 NOTE — TELEPHONE ENCOUNTER
Prior authorization approved  Payer: CHARU Mccormack Case ID: [id number]    [phone]    [phone]  Note from payer: Your PA request has been approved.  Additional information will be provided in the approval communication. (Message 1142)  Approval Details    Authorized from July 8, 2025 to July 8, 2026

## 2025-08-04 DIAGNOSIS — G43.009 MIGRAINE WITHOUT AURA AND WITHOUT STATUS MIGRAINOSUS, NOT INTRACTABLE: ICD-10-CM

## 2025-08-05 RX ORDER — UBROGEPANT 50 MG/1
TABLET ORAL
Qty: 10 TABLET | Refills: 2 | OUTPATIENT
Start: 2025-08-05 | End: 2026-08-04

## 2025-08-11 ENCOUNTER — PATIENT MESSAGE (OUTPATIENT)
Dept: NEUROLOGY | Facility: CLINIC | Age: 35
End: 2025-08-11

## (undated) DIAGNOSIS — R63.1 INCREASED THIRST: ICD-10-CM

## (undated) DIAGNOSIS — E16.1 INAPPROPRIATELY HIGH SERUM INSULIN: ICD-10-CM

## (undated) DIAGNOSIS — D35.2 PITUITARY MICROADENOMA WITH HYPERPROLACTINEMIA (HCC): Primary | ICD-10-CM

## (undated) DIAGNOSIS — R79.89 ELEVATED PROLACTIN LEVEL: Primary | ICD-10-CM

## (undated) DIAGNOSIS — R25.1 SHAKINESS: ICD-10-CM

## (undated) DIAGNOSIS — R53.1 WEAKNESS: ICD-10-CM

## (undated) DIAGNOSIS — E22.9 PITUITARY MICROADENOMA WITH HYPERPROLACTINEMIA (HCC): Primary | ICD-10-CM

## (undated) DIAGNOSIS — R53.1 WEAKNESS: Primary | ICD-10-CM

## (undated) DEVICE — SUTURE MONOCRYL 4-0 PS-2

## (undated) DEVICE — KENDALL SCD EXPRESS SLEEVES, KNEE LENGTH, MEDIUM: Brand: KENDALL SCD

## (undated) DEVICE — GAMMEX® PI HYBRID SIZE 7.5, STERILE POWDER-FREE SURGICAL GLOVE, POLYISOPRENE AND NEOPRENE BLEND: Brand: GAMMEX

## (undated) DEVICE — SOL  .9 1000ML BTL

## (undated) DEVICE — STRYKER HARMONIC 36CM REPRCSED

## (undated) DEVICE — DERMABOND LIQUID ADHESIVE

## (undated) DEVICE — GYN LAP CDS: Brand: MEDLINE INDUSTRIES, INC.

## (undated) DEVICE — TROCAR: Brand: KII® SLEEVE

## (undated) DEVICE — GAMMEX® PI HYBRID SIZE 8, STERILE POWDER-FREE SURGICAL GLOVE, POLYISOPRENE AND NEOPRENE BLEND: Brand: GAMMEX

## (undated) DEVICE — 40580 - THE PINK PAD - ADVANCED TRENDELENBURG POSITIONING KIT: Brand: 40580 - THE PINK PAD - ADVANCED TRENDELENBURG POSITIONING KIT

## (undated) DEVICE — INSUFFLATION NEEDLE TO ESTABLISH PNEUMOPERITONEUM.: Brand: INSUFFLATION NEEDLE

## (undated) DEVICE — TROCAR: Brand: KII FIOS FIRST ENTRY

## (undated) DEVICE — PKS LYONS DISSECTING FORCEPS 5MM/33CM: Brand: PK TECHNOLOGY

## (undated) DEVICE — PAD SACRAL SPAN AID

## (undated) NOTE — MR AVS SNAPSHOT
[address] [place] [phone]               Thank you for choosing us for your health care visit with Freddy Prescott MD.  We are glad to serve you and happy to provide you with this sum SPECIFIC GRAVITY 1.030 1.005 - 1.030    OCCULT BLOOD trace-intact Negative    PH, URINE 6.0 4.5 - 8.0    PROTEIN (URINE DIPSTICK) 30 Negative/Trace mg/dL    UROBILINOGEN,SEMI-QN 0.2 0.0 - 1.9 mg/dL    NITRITE, URINE neg Negative    LEUKOCYTES neg Negative

## (undated) NOTE — LETTER
Date: 2024    Patient Name: Vanessa Gonzalez, : 1990        To Whom it may concern:    This letter has been written at the patient's request. The above patient was seen at [facility] for treatment of a medical condition.    The follow is this patient's medication list that is necessary for Vanessa Gonzalez's health.     Omeprazole 40 MG Oral Capsule Delayed Release Take 1 capsule (40 mg total) by mouth daily. 90 capsule 0    cyclobenzaprine 10 MG Oral Tab Take 1 tablet (10 mg total) by mouth nightly as needed for Muscle spasms. 30 tablet 0    Ferrous Sulfate 325 (65 Fe) MG Oral Tab Take 1 tablet (325 mg total) by mouth daily with dinner. 90 tablet 1    Cholecalciferol (VITAMIN D) 50 MCG (2000 UT) Oral Cap Take 1 capsule (2,000 Units total) by mouth.      naproxen 500 MG Oral Tab Take 1 tablet (500 mg total) by mouth 2 (two) times daily as needed (take with food and big glass of water). 180 tablet 1    TRULANCE 3 MG Oral Tab Take 1 tablet by mouth daily. 30 tablet 5    fexofenadine 180 MG Oral Tab Take 1 tablet (180 mg total) by mouth daily.      cetirizine 10 MG Oral Tab Take 1 tablet (10 mg total) by mouth daily.      diphenhydrAMINE HCl (BENADRYL ALLERGY OR) Take by mouth.       Thank you for your understanding.    Sincerely,      Nyasia Carbajal MD  [facility]  24

## (undated) NOTE — LETTER
Date & Time: 5/2/2018, 3:02 PM  Patient: Diego Srinivasan  Encounter Provider(s):    Ellis Jeffers MD       To Whom It May Concern:    Aaron Valdez was seen and treated in our department on 5/2/2018. She may return to work 5/3/18.     If you have an

## (undated) NOTE — LETTER
Date & Time: 9/4/2018, 3:13 PM  Patient: Diallo Juarez  Encounter Provider(s):    Sheron Gagnon       To Whom It May Concern:    Ezequiel Betancourt was seen and treated in our department on 9/3/2018. Please excuse her from work through 9/5/2018.   Gerson Jacinto

## (undated) NOTE — Clinical Note
I forgot to put names on referrals placed for Rheum [facility]) and Urology [facility]). Please call pt with info to call for appt.

## (undated) NOTE — LETTER
Date: 3/29/2024    Patient Name: aVnessa Gonzalez, : 1990          To Whom it may concern:    This letter has been written at the patient's request. The above patient was seen at [facility] for treatment of a medical condition.    This patient would benefit from closer parking due to ongoing treatment of a medical condition.    Further recommendations pending response to treatment over the next 90 days.    Thank you for your understanding.      Sincerely,    Nyasia Carbajal MD

## (undated) NOTE — MR AVS SNAPSHOT
After Visit Summary   4/25/2017    Mca German    MRN: [id number]           Visit Information        Provider Department Dept Phone    4/25/2017  2:45 PM Roman Woodard MD [facility] [place] [phone]      Your Vitals Were     BP Pulse Ht Wt B not need to use this code after you have completed the sign-up process. If you do not sign up before the expiration date, you must request a new code.       [employer] Activation Code: [email or web address]  Expires: 6/24/2017  2:34 PM      Enter your Zip Code and Date

## (undated) NOTE — ED AVS SNAPSHOT
Jose Antonio Romero   MRN: [id number]    Department:  [facility] Emergency Department   Date of Visit:  6/5/2018           Disclosure     Insurance plans vary and the physician(s) referred by the ER may not be covered by your plan.  Please contact you tell this physician (or your personal doctor if your instructions are to return to your personal doctor) about any new or lasting problems. The primary care or specialist physician will see patients referred from the [facility] Emergency Department.  Chandler Corea